# Patient Record
Sex: FEMALE | Race: WHITE | NOT HISPANIC OR LATINO | URBAN - METROPOLITAN AREA
[De-identification: names, ages, dates, MRNs, and addresses within clinical notes are randomized per-mention and may not be internally consistent; named-entity substitution may affect disease eponyms.]

---

## 2017-06-19 ENCOUNTER — COMMUNICATION - HEALTHEAST (OUTPATIENT)
Dept: INTERNAL MEDICINE | Facility: CLINIC | Age: 82
End: 2017-06-19

## 2017-06-26 ENCOUNTER — OFFICE VISIT - HEALTHEAST (OUTPATIENT)
Dept: INTERNAL MEDICINE | Facility: CLINIC | Age: 82
End: 2017-06-26

## 2017-06-26 DIAGNOSIS — D64.9 NORMOCYTIC ANEMIA: ICD-10-CM

## 2017-06-26 DIAGNOSIS — L03.116 LEFT LEG CELLULITIS: ICD-10-CM

## 2017-06-26 DIAGNOSIS — Z71.89 ADVANCE DIRECTIVE DISCUSSED WITH PATIENT: ICD-10-CM

## 2017-06-26 DIAGNOSIS — Z00.00 ROUTINE PHYSICAL EXAMINATION: ICD-10-CM

## 2017-06-26 DIAGNOSIS — E53.8 B12 DEFICIENCY: ICD-10-CM

## 2017-06-26 DIAGNOSIS — S81.809A WOUND, OPEN, LEG: ICD-10-CM

## 2017-06-26 DIAGNOSIS — R41.3 MEMORY LOSS: ICD-10-CM

## 2017-06-26 DIAGNOSIS — M79.89 LEFT LEG SWELLING: ICD-10-CM

## 2017-06-26 ASSESSMENT — MIFFLIN-ST. JEOR: SCORE: 736.08

## 2017-07-04 ENCOUNTER — COMMUNICATION - HEALTHEAST (OUTPATIENT)
Dept: INTERNAL MEDICINE | Facility: CLINIC | Age: 82
End: 2017-07-04

## 2017-07-05 ENCOUNTER — OFFICE VISIT - HEALTHEAST (OUTPATIENT)
Dept: VASCULAR SURGERY | Facility: CLINIC | Age: 82
End: 2017-07-05

## 2017-07-05 DIAGNOSIS — R60.0 LOCALIZED EDEMA: ICD-10-CM

## 2017-07-05 DIAGNOSIS — I87.303 VENOUS HYPERTENSION, BILATERAL: ICD-10-CM

## 2017-07-05 DIAGNOSIS — R54 ADVANCED AGE: ICD-10-CM

## 2017-07-05 DIAGNOSIS — L03.116 LEFT LEG CELLULITIS: ICD-10-CM

## 2017-07-06 ENCOUNTER — COMMUNICATION - HEALTHEAST (OUTPATIENT)
Dept: INTERNAL MEDICINE | Facility: CLINIC | Age: 82
End: 2017-07-06

## 2017-07-09 ENCOUNTER — AMBULATORY - HEALTHEAST (OUTPATIENT)
Dept: INTERNAL MEDICINE | Facility: CLINIC | Age: 82
End: 2017-07-09

## 2017-07-12 ENCOUNTER — OFFICE VISIT - HEALTHEAST (OUTPATIENT)
Dept: VASCULAR SURGERY | Facility: CLINIC | Age: 82
End: 2017-07-12

## 2017-07-12 DIAGNOSIS — L03.116 LEFT LEG CELLULITIS: ICD-10-CM

## 2017-07-12 DIAGNOSIS — I87.303 VENOUS HYPERTENSION, BILATERAL: ICD-10-CM

## 2017-07-12 DIAGNOSIS — R54 ADVANCED AGE: ICD-10-CM

## 2017-07-12 DIAGNOSIS — R60.0 LOCALIZED EDEMA: ICD-10-CM

## 2017-07-12 DIAGNOSIS — L30.9 DERMATITIS: ICD-10-CM

## 2017-07-13 ENCOUNTER — COMMUNICATION - HEALTHEAST (OUTPATIENT)
Dept: INTERNAL MEDICINE | Facility: CLINIC | Age: 82
End: 2017-07-13

## 2017-07-26 ENCOUNTER — AMBULATORY - HEALTHEAST (OUTPATIENT)
Dept: NURSING | Facility: CLINIC | Age: 82
End: 2017-07-26

## 2017-07-26 ENCOUNTER — COMMUNICATION - HEALTHEAST (OUTPATIENT)
Dept: INTERNAL MEDICINE | Facility: CLINIC | Age: 82
End: 2017-07-26

## 2017-07-26 ENCOUNTER — OFFICE VISIT - HEALTHEAST (OUTPATIENT)
Dept: VASCULAR SURGERY | Facility: CLINIC | Age: 82
End: 2017-07-26

## 2017-07-26 ENCOUNTER — AMBULATORY - HEALTHEAST (OUTPATIENT)
Dept: LAB | Facility: CLINIC | Age: 82
End: 2017-07-26

## 2017-07-26 ENCOUNTER — COMMUNICATION - HEALTHEAST (OUTPATIENT)
Dept: LAB | Facility: CLINIC | Age: 82
End: 2017-07-26

## 2017-07-26 DIAGNOSIS — D64.9 ANEMIA: ICD-10-CM

## 2017-07-26 DIAGNOSIS — L03.116 LEFT LEG CELLULITIS: ICD-10-CM

## 2017-07-26 DIAGNOSIS — I87.303 VENOUS HYPERTENSION, BILATERAL: ICD-10-CM

## 2017-07-26 DIAGNOSIS — R60.0 LOCALIZED EDEMA: ICD-10-CM

## 2017-07-26 DIAGNOSIS — R54 ADVANCED AGE: ICD-10-CM

## 2017-07-26 DIAGNOSIS — L30.9 DERMATITIS: ICD-10-CM

## 2017-08-14 ENCOUNTER — OFFICE VISIT - HEALTHEAST (OUTPATIENT)
Dept: INTERNAL MEDICINE | Facility: CLINIC | Age: 82
End: 2017-08-14

## 2017-08-14 ENCOUNTER — COMMUNICATION - HEALTHEAST (OUTPATIENT)
Dept: INTERNAL MEDICINE | Facility: CLINIC | Age: 82
End: 2017-08-14

## 2017-08-14 DIAGNOSIS — I87.2 STASIS DERMATITIS: ICD-10-CM

## 2017-08-14 DIAGNOSIS — R41.3 MEMORY LOSS: ICD-10-CM

## 2017-08-14 DIAGNOSIS — D50.9 IDA (IRON DEFICIENCY ANEMIA): ICD-10-CM

## 2017-08-14 DIAGNOSIS — I87.2 VENOUS INSUFFICIENCY: ICD-10-CM

## 2017-08-14 ASSESSMENT — MIFFLIN-ST. JEOR: SCORE: 711.59

## 2017-08-18 ENCOUNTER — COMMUNICATION - HEALTHEAST (OUTPATIENT)
Dept: INTERNAL MEDICINE | Facility: CLINIC | Age: 82
End: 2017-08-18

## 2017-08-18 ENCOUNTER — COMMUNICATION - HEALTHEAST (OUTPATIENT)
Dept: SCHEDULING | Facility: CLINIC | Age: 82
End: 2017-08-18

## 2017-08-24 ENCOUNTER — COMMUNICATION - HEALTHEAST (OUTPATIENT)
Dept: INTERNAL MEDICINE | Facility: CLINIC | Age: 82
End: 2017-08-24

## 2017-10-17 ENCOUNTER — OFFICE VISIT - HEALTHEAST (OUTPATIENT)
Dept: INTERNAL MEDICINE | Facility: CLINIC | Age: 82
End: 2017-10-17

## 2017-10-17 ENCOUNTER — COMMUNICATION - HEALTHEAST (OUTPATIENT)
Dept: INTERNAL MEDICINE | Facility: CLINIC | Age: 82
End: 2017-10-17

## 2017-10-17 DIAGNOSIS — C16.9 GASTRIC CARCINOMA (H): ICD-10-CM

## 2017-10-17 DIAGNOSIS — E53.8 B12 DEFICIENCY: ICD-10-CM

## 2017-10-17 DIAGNOSIS — Z78.9 DNI (DO NOT INTUBATE): ICD-10-CM

## 2017-10-17 DIAGNOSIS — Z23 NEED FOR INFLUENZA VACCINATION: ICD-10-CM

## 2017-10-17 DIAGNOSIS — D50.9 IDA (IRON DEFICIENCY ANEMIA): ICD-10-CM

## 2017-10-17 DIAGNOSIS — Z66 DNR (DO NOT RESUSCITATE): ICD-10-CM

## 2017-12-06 ENCOUNTER — AMBULATORY - HEALTHEAST (OUTPATIENT)
Dept: NURSING | Facility: CLINIC | Age: 82
End: 2017-12-06

## 2018-01-04 ENCOUNTER — AMBULATORY - HEALTHEAST (OUTPATIENT)
Dept: NURSING | Facility: CLINIC | Age: 83
End: 2018-01-04

## 2018-01-04 ENCOUNTER — COMMUNICATION - HEALTHEAST (OUTPATIENT)
Dept: INTERNAL MEDICINE | Facility: CLINIC | Age: 83
End: 2018-01-04

## 2018-02-07 ENCOUNTER — AMBULATORY - HEALTHEAST (OUTPATIENT)
Dept: NURSING | Facility: CLINIC | Age: 83
End: 2018-02-07

## 2018-02-21 ENCOUNTER — COMMUNICATION - HEALTHEAST (OUTPATIENT)
Dept: INTERNAL MEDICINE | Facility: CLINIC | Age: 83
End: 2018-02-21

## 2018-02-22 ENCOUNTER — OFFICE VISIT - HEALTHEAST (OUTPATIENT)
Dept: INTERNAL MEDICINE | Facility: CLINIC | Age: 83
End: 2018-02-22

## 2018-02-22 DIAGNOSIS — E55.9 VITAMIN D DEFICIENCY: ICD-10-CM

## 2018-02-22 DIAGNOSIS — D50.9 IDA (IRON DEFICIENCY ANEMIA): ICD-10-CM

## 2018-02-22 DIAGNOSIS — C16.9 GASTRIC CARCINOMA (H): ICD-10-CM

## 2018-02-22 DIAGNOSIS — Z91.89 DRIVING SAFETY ISSUE: ICD-10-CM

## 2018-02-22 DIAGNOSIS — R41.3 MEMORY LOSS: ICD-10-CM

## 2018-02-22 DIAGNOSIS — R53.1 GENERALIZED WEAKNESS: ICD-10-CM

## 2018-02-22 DIAGNOSIS — E53.8 B12 DEFICIENCY: ICD-10-CM

## 2018-02-22 DIAGNOSIS — I87.2 VENOUS INSUFFICIENCY: ICD-10-CM

## 2018-02-22 LAB
ERYTHROCYTE [DISTWIDTH] IN BLOOD BY AUTOMATED COUNT: 10.5 % (ref 11–14.5)
FERRITIN SERPL-MCNC: 28 NG/ML (ref 10–130)
HCT VFR BLD AUTO: 36.8 % (ref 35–47)
HGB BLD-MCNC: 12.5 G/DL (ref 12–16)
IRON SATN MFR SERPL: 23 % (ref 20–50)
IRON SERPL-MCNC: 70 UG/DL (ref 42–175)
MCH RBC QN AUTO: 32.3 PG (ref 27–34)
MCHC RBC AUTO-ENTMCNC: 34 G/DL (ref 32–36)
MCV RBC AUTO: 95 FL (ref 80–100)
PLATELET # BLD AUTO: 209 THOU/UL (ref 140–440)
PMV BLD AUTO: 7.8 FL (ref 7–10)
RBC # BLD AUTO: 3.86 MILL/UL (ref 3.8–5.4)
TIBC SERPL-MCNC: 298 UG/DL (ref 313–563)
TRANSFERRIN SERPL-MCNC: 238 MG/DL (ref 212–360)
WBC: 6.9 THOU/UL (ref 4–11)

## 2018-02-23 LAB
25(OH)D3 SERPL-MCNC: 24.7 NG/ML (ref 30–80)
VIT B12 SERPL-MCNC: 435 PG/ML (ref 213–816)

## 2018-02-25 ENCOUNTER — COMMUNICATION - HEALTHEAST (OUTPATIENT)
Dept: INTERNAL MEDICINE | Facility: CLINIC | Age: 83
End: 2018-02-25

## 2018-03-07 ENCOUNTER — AMBULATORY - HEALTHEAST (OUTPATIENT)
Dept: NURSING | Facility: CLINIC | Age: 83
End: 2018-03-07

## 2018-04-04 ENCOUNTER — AMBULATORY - HEALTHEAST (OUTPATIENT)
Dept: NURSING | Facility: CLINIC | Age: 83
End: 2018-04-04

## 2018-05-02 ENCOUNTER — AMBULATORY - HEALTHEAST (OUTPATIENT)
Dept: NURSING | Facility: CLINIC | Age: 83
End: 2018-05-02

## 2018-06-04 ENCOUNTER — COMMUNICATION - HEALTHEAST (OUTPATIENT)
Dept: INTERNAL MEDICINE | Facility: CLINIC | Age: 83
End: 2018-06-04

## 2018-06-04 ENCOUNTER — OFFICE VISIT - HEALTHEAST (OUTPATIENT)
Dept: INTERNAL MEDICINE | Facility: CLINIC | Age: 83
End: 2018-06-04

## 2018-06-04 DIAGNOSIS — D50.9 IDA (IRON DEFICIENCY ANEMIA): ICD-10-CM

## 2018-06-04 DIAGNOSIS — I87.2 VENOUS INSUFFICIENCY: ICD-10-CM

## 2018-06-04 DIAGNOSIS — R41.3 MEMORY LOSS: ICD-10-CM

## 2018-06-04 DIAGNOSIS — C16.9 GASTRIC CARCINOMA (H): ICD-10-CM

## 2018-06-04 LAB
ANION GAP SERPL CALCULATED.3IONS-SCNC: 12 MMOL/L (ref 5–18)
BUN SERPL-MCNC: 14 MG/DL (ref 8–28)
CALCIUM SERPL-MCNC: 9.2 MG/DL (ref 8.5–10.5)
CHLORIDE BLD-SCNC: 112 MMOL/L (ref 98–107)
CO2 SERPL-SCNC: 21 MMOL/L (ref 22–31)
CREAT SERPL-MCNC: 0.71 MG/DL (ref 0.6–1.1)
ERYTHROCYTE [DISTWIDTH] IN BLOOD BY AUTOMATED COUNT: 10.3 % (ref 11–14.5)
FERRITIN SERPL-MCNC: 25 NG/ML (ref 10–130)
GFR SERPL CREATININE-BSD FRML MDRD: >60 ML/MIN/1.73M2
GLUCOSE BLD-MCNC: 83 MG/DL (ref 70–125)
HCT VFR BLD AUTO: 37.9 % (ref 35–47)
HGB BLD-MCNC: 12.6 G/DL (ref 12–16)
IRON SATN MFR SERPL: 31 % (ref 20–50)
IRON SERPL-MCNC: 94 UG/DL (ref 42–175)
MCH RBC QN AUTO: 32.5 PG (ref 27–34)
MCHC RBC AUTO-ENTMCNC: 33.2 G/DL (ref 32–36)
MCV RBC AUTO: 98 FL (ref 80–100)
PLATELET # BLD AUTO: 168 THOU/UL (ref 140–440)
PMV BLD AUTO: 8.2 FL (ref 7–10)
POTASSIUM BLD-SCNC: 4.2 MMOL/L (ref 3.5–5)
RBC # BLD AUTO: 3.87 MILL/UL (ref 3.8–5.4)
SODIUM SERPL-SCNC: 145 MMOL/L (ref 136–145)
TIBC SERPL-MCNC: 308 UG/DL (ref 313–563)
TRANSFERRIN SERPL-MCNC: 247 MG/DL (ref 212–360)
WBC: 6.4 THOU/UL (ref 4–11)

## 2018-06-20 ENCOUNTER — OFFICE VISIT - HEALTHEAST (OUTPATIENT)
Dept: FAMILY MEDICINE | Facility: CLINIC | Age: 83
End: 2018-06-20

## 2018-06-20 DIAGNOSIS — T14.8XXA BRUISING: ICD-10-CM

## 2018-07-03 ENCOUNTER — AMBULATORY - HEALTHEAST (OUTPATIENT)
Dept: NURSING | Facility: CLINIC | Age: 83
End: 2018-07-03

## 2018-08-02 ENCOUNTER — AMBULATORY - HEALTHEAST (OUTPATIENT)
Dept: NURSING | Facility: CLINIC | Age: 83
End: 2018-08-02

## 2018-08-30 ENCOUNTER — COMMUNICATION - HEALTHEAST (OUTPATIENT)
Dept: INTERNAL MEDICINE | Facility: CLINIC | Age: 83
End: 2018-08-30

## 2018-09-06 ENCOUNTER — AMBULATORY - HEALTHEAST (OUTPATIENT)
Dept: NURSING | Facility: CLINIC | Age: 83
End: 2018-09-06

## 2018-10-08 ENCOUNTER — OFFICE VISIT - HEALTHEAST (OUTPATIENT)
Dept: FAMILY MEDICINE | Facility: CLINIC | Age: 83
End: 2018-10-08

## 2018-10-08 DIAGNOSIS — L60.0 INGROWN TOENAIL OF LEFT FOOT WITH INFECTION: ICD-10-CM

## 2018-11-05 ENCOUNTER — HOME CARE/HOSPICE - HEALTHEAST (OUTPATIENT)
Dept: HOME HEALTH SERVICES | Facility: HOME HEALTH | Age: 83
End: 2018-11-05

## 2018-11-07 ENCOUNTER — COMMUNICATION - HEALTHEAST (OUTPATIENT)
Dept: HOME HEALTH SERVICES | Facility: HOME HEALTH | Age: 83
End: 2018-11-07

## 2018-11-07 ENCOUNTER — HOME CARE/HOSPICE - HEALTHEAST (OUTPATIENT)
Dept: HOME HEALTH SERVICES | Facility: HOME HEALTH | Age: 83
End: 2018-11-07

## 2018-11-07 ENCOUNTER — COMMUNICATION - HEALTHEAST (OUTPATIENT)
Dept: CARE COORDINATION | Facility: CLINIC | Age: 83
End: 2018-11-07

## 2018-11-08 ENCOUNTER — HOME CARE/HOSPICE - HEALTHEAST (OUTPATIENT)
Dept: HOME HEALTH SERVICES | Facility: HOME HEALTH | Age: 83
End: 2018-11-08

## 2018-11-09 ENCOUNTER — HOME CARE/HOSPICE - HEALTHEAST (OUTPATIENT)
Dept: HOME HEALTH SERVICES | Facility: HOME HEALTH | Age: 83
End: 2018-11-09

## 2018-11-10 ENCOUNTER — HOME CARE/HOSPICE - HEALTHEAST (OUTPATIENT)
Dept: HOME HEALTH SERVICES | Facility: HOME HEALTH | Age: 83
End: 2018-11-10

## 2018-11-12 ENCOUNTER — COMMUNICATION - HEALTHEAST (OUTPATIENT)
Dept: TELEHEALTH | Facility: CLINIC | Age: 83
End: 2018-11-12

## 2018-11-12 ENCOUNTER — HOME CARE/HOSPICE - HEALTHEAST (OUTPATIENT)
Dept: HOME HEALTH SERVICES | Facility: HOME HEALTH | Age: 83
End: 2018-11-12

## 2018-11-12 ENCOUNTER — OFFICE VISIT - HEALTHEAST (OUTPATIENT)
Dept: INTERNAL MEDICINE | Facility: CLINIC | Age: 83
End: 2018-11-12

## 2018-11-12 DIAGNOSIS — E53.8 B12 DEFICIENCY: ICD-10-CM

## 2018-11-12 DIAGNOSIS — Z09 HOSPITAL DISCHARGE FOLLOW-UP: ICD-10-CM

## 2018-11-12 DIAGNOSIS — I87.2 VENOUS INSUFFICIENCY: ICD-10-CM

## 2018-11-12 DIAGNOSIS — I63.9 ISCHEMIC CEREBROVASCULAR ACCIDENT (CVA) (H): ICD-10-CM

## 2018-11-12 DIAGNOSIS — R29.810 FACIAL DROOP: ICD-10-CM

## 2018-11-12 DIAGNOSIS — C16.9 GASTRIC CARCINOMA (H): ICD-10-CM

## 2018-11-12 DIAGNOSIS — I10 ESSENTIAL HYPERTENSION: ICD-10-CM

## 2018-11-12 DIAGNOSIS — R41.82 ALTERED MENTAL STATUS, UNSPECIFIED ALTERED MENTAL STATUS TYPE: ICD-10-CM

## 2018-11-13 ENCOUNTER — HOME CARE/HOSPICE - HEALTHEAST (OUTPATIENT)
Dept: HOME HEALTH SERVICES | Facility: HOME HEALTH | Age: 83
End: 2018-11-13

## 2018-11-13 ENCOUNTER — COMMUNICATION - HEALTHEAST (OUTPATIENT)
Dept: PHYSICAL THERAPY | Facility: CLINIC | Age: 83
End: 2018-11-13

## 2018-11-14 ENCOUNTER — HOME CARE/HOSPICE - HEALTHEAST (OUTPATIENT)
Dept: HOME HEALTH SERVICES | Facility: HOME HEALTH | Age: 83
End: 2018-11-14

## 2018-11-15 ENCOUNTER — HOME CARE/HOSPICE - HEALTHEAST (OUTPATIENT)
Dept: HOME HEALTH SERVICES | Facility: HOME HEALTH | Age: 83
End: 2018-11-15

## 2018-11-16 ENCOUNTER — HOME CARE/HOSPICE - HEALTHEAST (OUTPATIENT)
Dept: HOME HEALTH SERVICES | Facility: HOME HEALTH | Age: 83
End: 2018-11-16

## 2018-11-17 ENCOUNTER — HOME CARE/HOSPICE - HEALTHEAST (OUTPATIENT)
Dept: HOME HEALTH SERVICES | Facility: HOME HEALTH | Age: 83
End: 2018-11-17

## 2018-11-18 ENCOUNTER — COMMUNICATION - HEALTHEAST (OUTPATIENT)
Dept: HOME HEALTH SERVICES | Facility: HOME HEALTH | Age: 83
End: 2018-11-18

## 2018-11-19 ENCOUNTER — HOME CARE/HOSPICE - HEALTHEAST (OUTPATIENT)
Dept: HOME HEALTH SERVICES | Facility: HOME HEALTH | Age: 83
End: 2018-11-19

## 2018-11-20 ENCOUNTER — HOME CARE/HOSPICE - HEALTHEAST (OUTPATIENT)
Dept: HOME HEALTH SERVICES | Facility: HOME HEALTH | Age: 83
End: 2018-11-20

## 2018-11-21 ENCOUNTER — HOME CARE/HOSPICE - HEALTHEAST (OUTPATIENT)
Dept: HOME HEALTH SERVICES | Facility: HOME HEALTH | Age: 83
End: 2018-11-21

## 2018-11-23 ENCOUNTER — HOME CARE/HOSPICE - HEALTHEAST (OUTPATIENT)
Dept: HOME HEALTH SERVICES | Facility: HOME HEALTH | Age: 83
End: 2018-11-23

## 2018-12-11 ENCOUNTER — OFFICE VISIT - HEALTHEAST (OUTPATIENT)
Dept: INTERNAL MEDICINE | Facility: CLINIC | Age: 83
End: 2018-12-11

## 2018-12-11 ENCOUNTER — COMMUNICATION - HEALTHEAST (OUTPATIENT)
Dept: INTERNAL MEDICINE | Facility: CLINIC | Age: 83
End: 2018-12-11

## 2018-12-11 DIAGNOSIS — E53.8 B12 DEFICIENCY: ICD-10-CM

## 2018-12-11 DIAGNOSIS — I10 ESSENTIAL HYPERTENSION: ICD-10-CM

## 2018-12-11 DIAGNOSIS — C16.9 GASTRIC CARCINOMA (H): ICD-10-CM

## 2018-12-11 DIAGNOSIS — F03.90 DEMENTIA WITHOUT BEHAVIORAL DISTURBANCE, UNSPECIFIED DEMENTIA TYPE: ICD-10-CM

## 2018-12-11 DIAGNOSIS — D64.9 ANEMIA, UNSPECIFIED TYPE: ICD-10-CM

## 2018-12-11 LAB
ANION GAP SERPL CALCULATED.3IONS-SCNC: 10 MMOL/L (ref 5–18)
BUN SERPL-MCNC: 29 MG/DL (ref 8–28)
CALCIUM SERPL-MCNC: 9.5 MG/DL (ref 8.5–10.5)
CHLORIDE BLD-SCNC: 104 MMOL/L (ref 98–107)
CO2 SERPL-SCNC: 26 MMOL/L (ref 22–31)
CREAT SERPL-MCNC: 0.84 MG/DL (ref 0.6–1.1)
ERYTHROCYTE [DISTWIDTH] IN BLOOD BY AUTOMATED COUNT: 11.3 % (ref 11–14.5)
GFR SERPL CREATININE-BSD FRML MDRD: >60 ML/MIN/1.73M2
GLUCOSE BLD-MCNC: 107 MG/DL (ref 70–125)
HCT VFR BLD AUTO: 37 % (ref 35–47)
HGB BLD-MCNC: 12.8 G/DL (ref 12–16)
MCH RBC QN AUTO: 32.9 PG (ref 27–34)
MCHC RBC AUTO-ENTMCNC: 34.6 G/DL (ref 32–36)
MCV RBC AUTO: 95 FL (ref 80–100)
PLATELET # BLD AUTO: 219 THOU/UL (ref 140–440)
PMV BLD AUTO: 7.9 FL (ref 7–10)
POTASSIUM BLD-SCNC: 4.8 MMOL/L (ref 3.5–5)
RBC # BLD AUTO: 3.88 MILL/UL (ref 3.8–5.4)
SODIUM SERPL-SCNC: 140 MMOL/L (ref 136–145)
WBC: 9.5 THOU/UL (ref 4–11)

## 2018-12-11 ASSESSMENT — MIFFLIN-ST. JEOR: SCORE: 697.98

## 2019-01-01 ENCOUNTER — OFFICE VISIT - HEALTHEAST (OUTPATIENT)
Dept: INTERNAL MEDICINE | Facility: CLINIC | Age: 84
End: 2019-01-01

## 2019-01-01 ENCOUNTER — AMBULATORY - HEALTHEAST (OUTPATIENT)
Dept: NURSING | Facility: CLINIC | Age: 84
End: 2019-01-01

## 2019-01-01 ENCOUNTER — COMMUNICATION - HEALTHEAST (OUTPATIENT)
Dept: INTERNAL MEDICINE | Facility: CLINIC | Age: 84
End: 2019-01-01

## 2019-01-01 ENCOUNTER — OFFICE VISIT - HEALTHEAST (OUTPATIENT)
Dept: GERIATRICS | Facility: CLINIC | Age: 84
End: 2019-01-01

## 2019-01-01 ENCOUNTER — COMMUNICATION - HEALTHEAST (OUTPATIENT)
Dept: TELEHEALTH | Facility: CLINIC | Age: 84
End: 2019-01-01

## 2019-01-01 ENCOUNTER — COMMUNICATION - HEALTHEAST (OUTPATIENT)
Dept: OTHER | Facility: CLINIC | Age: 84
End: 2019-01-01

## 2019-01-01 ENCOUNTER — OFFICE VISIT - HEALTHEAST (OUTPATIENT)
Dept: FAMILY MEDICINE | Facility: CLINIC | Age: 84
End: 2019-01-01

## 2019-01-01 DIAGNOSIS — I63.9 ISCHEMIC CEREBROVASCULAR ACCIDENT (CVA) (H): ICD-10-CM

## 2019-01-01 DIAGNOSIS — I87.2 VENOUS STASIS DERMATITIS OF LEFT LOWER EXTREMITY: ICD-10-CM

## 2019-01-01 DIAGNOSIS — F03.90 DEMENTIA WITHOUT BEHAVIORAL DISTURBANCE, UNSPECIFIED DEMENTIA TYPE: ICD-10-CM

## 2019-01-01 DIAGNOSIS — L03.116 CELLULITIS OF LEFT LEG: ICD-10-CM

## 2019-01-01 DIAGNOSIS — L03.116 CELLULITIS OF LEFT LOWER EXTREMITY: ICD-10-CM

## 2019-01-01 DIAGNOSIS — E53.8 B12 DEFICIENCY: ICD-10-CM

## 2019-01-01 DIAGNOSIS — L03.116 LEFT LEG CELLULITIS: ICD-10-CM

## 2019-01-01 DIAGNOSIS — I10 ESSENTIAL HYPERTENSION: ICD-10-CM

## 2019-01-01 DIAGNOSIS — I48.91 ATRIAL FIBRILLATION WITH RAPID VENTRICULAR RESPONSE (H): ICD-10-CM

## 2019-01-01 DIAGNOSIS — E43 SEVERE PROTEIN-CALORIE MALNUTRITION (GOMEZ: LESS THAN 60% OF STANDARD WEIGHT) (H): ICD-10-CM

## 2019-01-01 DIAGNOSIS — Z66 DNR (DO NOT RESUSCITATE): ICD-10-CM

## 2019-01-01 DIAGNOSIS — M81.0 AGE RELATED OSTEOPOROSIS, UNSPECIFIED PATHOLOGICAL FRACTURE PRESENCE: ICD-10-CM

## 2019-01-01 DIAGNOSIS — I10 PRIMARY HYPERTENSION: ICD-10-CM

## 2019-01-01 DIAGNOSIS — I87.2 VENOUS INSUFFICIENCY OF LEFT LEG: ICD-10-CM

## 2019-01-01 DIAGNOSIS — Z78.9 DNI (DO NOT INTUBATE): ICD-10-CM

## 2019-01-01 DIAGNOSIS — R47.01 APHASIA: ICD-10-CM

## 2019-01-01 DIAGNOSIS — C16.9 GASTRIC CARCINOMA (H): ICD-10-CM

## 2019-01-01 DIAGNOSIS — I63.9 ACUTE CEREBROVASCULAR ACCIDENT (H): ICD-10-CM

## 2019-01-01 DIAGNOSIS — I83.028 VARICOSE VEINS OF LEFT LOWER EXTREMITY WITH ULCER OTHER PART OF LOWER LEG (CODE) (H): ICD-10-CM

## 2019-01-01 DIAGNOSIS — G81.91 RIGHT HEMIPLEGIA (H): ICD-10-CM

## 2019-01-01 DIAGNOSIS — Z23 FLU VACCINE NEED: ICD-10-CM

## 2019-01-01 DIAGNOSIS — I87.2 VENOUS INSUFFICIENCY: ICD-10-CM

## 2019-01-01 DIAGNOSIS — L97.821 NON-PRESSURE CHRONIC ULCER OF OTHER PART OF LEFT LOWER LEG LIMITED TO BREAKDOWN OF SKIN (H): ICD-10-CM

## 2019-01-01 DIAGNOSIS — E11.9 TYPE 2 DIABETES MELLITUS WITHOUT COMPLICATION, WITHOUT LONG-TERM CURRENT USE OF INSULIN (H): ICD-10-CM

## 2019-01-01 LAB
ANION GAP SERPL CALCULATED.3IONS-SCNC: 8 MMOL/L (ref 5–18)
BUN SERPL-MCNC: 14 MG/DL (ref 8–28)
CALCIUM SERPL-MCNC: 8.9 MG/DL (ref 8.5–10.5)
CHLORIDE BLD-SCNC: 110 MMOL/L (ref 98–107)
CO2 SERPL-SCNC: 25 MMOL/L (ref 22–31)
CREAT SERPL-MCNC: 0.78 MG/DL (ref 0.6–1.1)
ERYTHROCYTE [DISTWIDTH] IN BLOOD BY AUTOMATED COUNT: 11.1 % (ref 11–14.5)
GFR SERPL CREATININE-BSD FRML MDRD: >60 ML/MIN/1.73M2
GLUCOSE BLD-MCNC: 91 MG/DL (ref 70–125)
HCT VFR BLD AUTO: 34.8 % (ref 35–47)
HGB BLD-MCNC: 11.3 G/DL (ref 12–16)
MCH RBC QN AUTO: 29.7 PG (ref 27–34)
MCHC RBC AUTO-ENTMCNC: 32.3 G/DL (ref 32–36)
MCV RBC AUTO: 92 FL (ref 80–100)
PLATELET # BLD AUTO: 234 THOU/UL (ref 140–440)
PMV BLD AUTO: 7.6 FL (ref 7–10)
POTASSIUM BLD-SCNC: 4 MMOL/L (ref 3.5–5)
RBC # BLD AUTO: 3.79 MILL/UL (ref 3.8–5.4)
SODIUM SERPL-SCNC: 143 MMOL/L (ref 136–145)
VIT B12 SERPL-MCNC: 613 PG/ML (ref 213–816)
WBC: 6.6 THOU/UL (ref 4–11)

## 2019-01-01 RX ORDER — TRIAMCINOLONE ACETONIDE 1 MG/G
CREAM TOPICAL 2 TIMES DAILY
Qty: 454 G | Refills: 2 | Status: SHIPPED | OUTPATIENT
Start: 2019-01-01

## 2019-01-01 ASSESSMENT — MIFFLIN-ST. JEOR
SCORE: 781.89
SCORE: 779.17

## 2019-01-10 ENCOUNTER — AMBULATORY - HEALTHEAST (OUTPATIENT)
Dept: NURSING | Facility: CLINIC | Age: 84
End: 2019-01-10

## 2019-01-24 ENCOUNTER — COMMUNICATION - HEALTHEAST (OUTPATIENT)
Dept: INTERNAL MEDICINE | Facility: CLINIC | Age: 84
End: 2019-01-24

## 2019-02-12 ENCOUNTER — AMBULATORY - HEALTHEAST (OUTPATIENT)
Dept: NURSING | Facility: CLINIC | Age: 84
End: 2019-02-12

## 2019-03-13 ENCOUNTER — AMBULATORY - HEALTHEAST (OUTPATIENT)
Dept: NURSING | Facility: CLINIC | Age: 84
End: 2019-03-13

## 2019-04-10 ENCOUNTER — AMBULATORY - HEALTHEAST (OUTPATIENT)
Dept: NURSING | Facility: CLINIC | Age: 84
End: 2019-04-10

## 2019-05-28 ENCOUNTER — COMMUNICATION - HEALTHEAST (OUTPATIENT)
Dept: INTERNAL MEDICINE | Facility: CLINIC | Age: 84
End: 2019-05-28

## 2019-05-28 ENCOUNTER — OFFICE VISIT - HEALTHEAST (OUTPATIENT)
Dept: INTERNAL MEDICINE | Facility: CLINIC | Age: 84
End: 2019-05-28

## 2019-05-28 ENCOUNTER — COMMUNICATION - HEALTHEAST (OUTPATIENT)
Dept: TELEHEALTH | Facility: CLINIC | Age: 84
End: 2019-05-28

## 2019-05-28 DIAGNOSIS — I87.2 VENOUS INSUFFICIENCY: ICD-10-CM

## 2019-05-28 DIAGNOSIS — S81.002A OPEN WOUND OF KNEE, LEG, AND ANKLE, LEFT, INITIAL ENCOUNTER: ICD-10-CM

## 2019-05-28 DIAGNOSIS — I10 ESSENTIAL HYPERTENSION: ICD-10-CM

## 2019-05-28 DIAGNOSIS — S81.802A OPEN WOUND OF KNEE, LEG, AND ANKLE, LEFT, INITIAL ENCOUNTER: ICD-10-CM

## 2019-05-28 DIAGNOSIS — S91.002A OPEN WOUND OF KNEE, LEG, AND ANKLE, LEFT, INITIAL ENCOUNTER: ICD-10-CM

## 2019-05-28 DIAGNOSIS — F03.90 DEMENTIA WITHOUT BEHAVIORAL DISTURBANCE, UNSPECIFIED DEMENTIA TYPE: ICD-10-CM

## 2019-05-28 DIAGNOSIS — C16.9 GASTRIC CARCINOMA (H): ICD-10-CM

## 2019-05-28 DIAGNOSIS — E53.8 B12 DEFICIENCY: ICD-10-CM

## 2019-05-28 DIAGNOSIS — I63.9 ISCHEMIC CEREBROVASCULAR ACCIDENT (CVA) (H): ICD-10-CM

## 2019-05-28 LAB
ANION GAP SERPL CALCULATED.3IONS-SCNC: 8 MMOL/L (ref 5–18)
BUN SERPL-MCNC: 17 MG/DL (ref 8–28)
CALCIUM SERPL-MCNC: 9.7 MG/DL (ref 8.5–10.5)
CHLORIDE BLD-SCNC: 110 MMOL/L (ref 98–107)
CO2 SERPL-SCNC: 28 MMOL/L (ref 22–31)
CREAT SERPL-MCNC: 0.82 MG/DL (ref 0.6–1.1)
ERYTHROCYTE [DISTWIDTH] IN BLOOD BY AUTOMATED COUNT: 10.7 % (ref 11–14.5)
GFR SERPL CREATININE-BSD FRML MDRD: >60 ML/MIN/1.73M2
GLUCOSE BLD-MCNC: 95 MG/DL (ref 70–125)
HCT VFR BLD AUTO: 35.9 % (ref 35–47)
HGB BLD-MCNC: 12 G/DL (ref 12–16)
MCH RBC QN AUTO: 31.8 PG (ref 27–34)
MCHC RBC AUTO-ENTMCNC: 33.4 G/DL (ref 32–36)
MCV RBC AUTO: 95 FL (ref 80–100)
PLATELET # BLD AUTO: 239 THOU/UL (ref 140–440)
PMV BLD AUTO: 8.6 FL (ref 7–10)
POTASSIUM BLD-SCNC: 4.2 MMOL/L (ref 3.5–5)
RBC # BLD AUTO: 3.77 MILL/UL (ref 3.8–5.4)
SODIUM SERPL-SCNC: 146 MMOL/L (ref 136–145)
WBC: 6.1 THOU/UL (ref 4–11)

## 2020-01-01 ENCOUNTER — COMMUNICATION - HEALTHEAST (OUTPATIENT)
Dept: ANTICOAGULATION | Facility: CLINIC | Age: 85
End: 2020-01-01

## 2020-01-01 ENCOUNTER — COMMUNICATION - HEALTHEAST (OUTPATIENT)
Dept: INTERNAL MEDICINE | Facility: CLINIC | Age: 85
End: 2020-01-01

## 2020-01-01 ENCOUNTER — OFFICE VISIT - HEALTHEAST (OUTPATIENT)
Dept: GERIATRICS | Facility: CLINIC | Age: 85
End: 2020-01-01

## 2020-01-01 ENCOUNTER — RECORDS - HEALTHEAST (OUTPATIENT)
Dept: ADMINISTRATIVE | Facility: OTHER | Age: 85
End: 2020-01-01

## 2020-01-01 ENCOUNTER — AMBULATORY - HEALTHEAST (OUTPATIENT)
Dept: OTHER | Facility: CLINIC | Age: 85
End: 2020-01-01

## 2020-01-01 ENCOUNTER — AMBULATORY - HEALTHEAST (OUTPATIENT)
Dept: ANTICOAGULATION | Facility: CLINIC | Age: 85
End: 2020-01-01

## 2020-01-01 ENCOUNTER — RECORDS - HEALTHEAST (OUTPATIENT)
Dept: LAB | Facility: CLINIC | Age: 85
End: 2020-01-01

## 2020-01-01 ENCOUNTER — OFFICE VISIT - HEALTHEAST (OUTPATIENT)
Dept: INTERNAL MEDICINE | Facility: CLINIC | Age: 85
End: 2020-01-01

## 2020-01-01 ENCOUNTER — AMBULATORY - HEALTHEAST (OUTPATIENT)
Dept: GERIATRICS | Facility: CLINIC | Age: 85
End: 2020-01-01

## 2020-01-01 ENCOUNTER — COMMUNICATION - HEALTHEAST (OUTPATIENT)
Dept: MEDSURG UNIT | Facility: HOSPITAL | Age: 85
End: 2020-01-01

## 2020-01-01 ENCOUNTER — COMMUNICATION - HEALTHEAST (OUTPATIENT)
Dept: GERIATRICS | Facility: CLINIC | Age: 85
End: 2020-01-01

## 2020-01-01 DIAGNOSIS — R47.02 DYSPHASIA: ICD-10-CM

## 2020-01-01 DIAGNOSIS — I63.9 ACUTE CEREBROVASCULAR ACCIDENT (H): ICD-10-CM

## 2020-01-01 DIAGNOSIS — I82.4Z2 ACUTE DEEP VEIN THROMBOSIS OF LOWER LEG, LEFT (H): ICD-10-CM

## 2020-01-01 DIAGNOSIS — F51.01 PRIMARY INSOMNIA: ICD-10-CM

## 2020-01-01 DIAGNOSIS — I48.91 ATRIAL FIBRILLATION WITH RAPID VENTRICULAR RESPONSE (H): ICD-10-CM

## 2020-01-01 DIAGNOSIS — E43 SEVERE PROTEIN-CALORIE MALNUTRITION (H): ICD-10-CM

## 2020-01-01 DIAGNOSIS — I48.20 CHRONIC ATRIAL FIBRILLATION (H): ICD-10-CM

## 2020-01-01 DIAGNOSIS — E11.9 TYPE 2 DIABETES MELLITUS WITHOUT COMPLICATION, WITHOUT LONG-TERM CURRENT USE OF INSULIN (H): ICD-10-CM

## 2020-01-01 DIAGNOSIS — E53.8 VITAMIN B12 DEFICIENCY (NON ANEMIC): ICD-10-CM

## 2020-01-01 DIAGNOSIS — D69.9 BLEEDING DIATHESIS (H): ICD-10-CM

## 2020-01-01 DIAGNOSIS — I10 PRIMARY HYPERTENSION: ICD-10-CM

## 2020-01-01 DIAGNOSIS — I50.22 CHRONIC SYSTOLIC (CONGESTIVE) HEART FAILURE (H): ICD-10-CM

## 2020-01-01 DIAGNOSIS — I82.412 ACUTE DEEP VEIN THROMBOSIS (DVT) OF LEFT FEMORAL VEIN (H): ICD-10-CM

## 2020-01-01 DIAGNOSIS — I63.9 ISCHEMIC CEREBROVASCULAR ACCIDENT (CVA) (H): ICD-10-CM

## 2020-01-01 DIAGNOSIS — C16.9 GASTRIC CARCINOMA (H): ICD-10-CM

## 2020-01-01 DIAGNOSIS — I50.22 CHRONIC SYSTOLIC CONGESTIVE HEART FAILURE (H): ICD-10-CM

## 2020-01-01 DIAGNOSIS — F03.90 DEMENTIA WITHOUT BEHAVIORAL DISTURBANCE, UNSPECIFIED DEMENTIA TYPE: ICD-10-CM

## 2020-01-01 DIAGNOSIS — R47.01 APHASIA: ICD-10-CM

## 2020-01-01 DIAGNOSIS — I10 ESSENTIAL HYPERTENSION: ICD-10-CM

## 2020-01-01 DIAGNOSIS — E53.8 B12 DEFICIENCY: ICD-10-CM

## 2020-01-01 DIAGNOSIS — G81.91 RIGHT HEMIPLEGIA (H): ICD-10-CM

## 2020-01-01 DIAGNOSIS — D50.9 MICROCYTIC ANEMIA: ICD-10-CM

## 2020-01-01 DIAGNOSIS — G47.9 SLEEP DISTURBANCE: ICD-10-CM

## 2020-01-01 DIAGNOSIS — R74.8 ELEVATED ALKALINE PHOSPHATASE LEVEL: ICD-10-CM

## 2020-01-01 DIAGNOSIS — I82.412 DVT OF DEEP FEMORAL VEIN, LEFT (H): ICD-10-CM

## 2020-01-01 DIAGNOSIS — Z66 PHYSICIAN ORDERS FOR LIFE-SUSTAINING TREATMENT (POLST) FORM INDICATES PATIENT WISH FOR DO-NOT-RESUSCITATE STATUS: ICD-10-CM

## 2020-01-01 DIAGNOSIS — R23.3 ABNORMAL BRUISING: ICD-10-CM

## 2020-01-01 DIAGNOSIS — R60.0 LOCALIZED EDEMA: ICD-10-CM

## 2020-01-01 DIAGNOSIS — R06.89 DECREASED BREATH SOUNDS AT RIGHT LUNG BASE: ICD-10-CM

## 2020-01-01 DIAGNOSIS — R94.5 ABNORMAL RESULTS OF LIVER FUNCTION STUDIES: ICD-10-CM

## 2020-01-01 DIAGNOSIS — R13.12 OROPHARYNGEAL DYSPHAGIA: ICD-10-CM

## 2020-01-01 DIAGNOSIS — D50.9 IRON DEFICIENCY ANEMIA, UNSPECIFIED IRON DEFICIENCY ANEMIA TYPE: ICD-10-CM

## 2020-01-01 DIAGNOSIS — Z79.01 ANTICOAGULATED: ICD-10-CM

## 2020-01-01 DIAGNOSIS — Z78.9 DNI (DO NOT INTUBATE): ICD-10-CM

## 2020-01-01 DIAGNOSIS — I87.2 VENOUS INSUFFICIENCY: ICD-10-CM

## 2020-01-01 DIAGNOSIS — Z86.73 HISTORY OF CVA (CEREBROVASCULAR ACCIDENT): ICD-10-CM

## 2020-01-01 DIAGNOSIS — E11.65 TYPE 2 DIABETES MELLITUS WITH HYPERGLYCEMIA, WITHOUT LONG-TERM CURRENT USE OF INSULIN (H): ICD-10-CM

## 2020-01-01 DIAGNOSIS — R09.02 HYPOXIA: ICD-10-CM

## 2020-01-01 DIAGNOSIS — I50.33 ACUTE ON CHRONIC DIASTOLIC (CONGESTIVE) HEART FAILURE (H): ICD-10-CM

## 2020-01-01 DIAGNOSIS — L03.115 CELLULITIS OF RIGHT LOWER EXTREMITY: ICD-10-CM

## 2020-01-01 DIAGNOSIS — R05.9 COUGHING: ICD-10-CM

## 2020-01-01 DIAGNOSIS — R63.4 WEIGHT LOSS: ICD-10-CM

## 2020-01-01 LAB
ALBUMIN SERPL-MCNC: 2.5 G/DL (ref 3.5–5)
ALBUMIN SERPL-MCNC: 3.5 G/DL (ref 3.5–5)
ALP SERPL-CCNC: 213 U/L (ref 45–120)
ALP SERPL-CCNC: 93 U/L (ref 45–120)
ALT SERPL W P-5'-P-CCNC: 31 U/L (ref 0–45)
ALT SERPL W P-5'-P-CCNC: 55 U/L (ref 0–45)
ANION GAP SERPL CALCULATED.3IONS-SCNC: 15 MMOL/L (ref 5–18)
ANION GAP SERPL CALCULATED.3IONS-SCNC: 8 MMOL/L (ref 5–18)
ANION GAP SERPL CALCULATED.3IONS-SCNC: 8 MMOL/L (ref 5–18)
AST SERPL W P-5'-P-CCNC: 21 U/L (ref 0–40)
AST SERPL W P-5'-P-CCNC: 49 U/L (ref 0–40)
BILIRUB DIRECT SERPL-MCNC: 0.4 MG/DL
BILIRUB SERPL-MCNC: 0.7 MG/DL (ref 0–1)
BILIRUB SERPL-MCNC: 0.8 MG/DL (ref 0–1)
BUN SERPL-MCNC: 16 MG/DL (ref 8–28)
BUN SERPL-MCNC: 18 MG/DL (ref 8–28)
BUN SERPL-MCNC: 29 MG/DL (ref 8–28)
CALCIUM SERPL-MCNC: 8.1 MG/DL (ref 8.5–10.5)
CALCIUM SERPL-MCNC: 8.5 MG/DL (ref 8.5–10.5)
CALCIUM SERPL-MCNC: 8.8 MG/DL (ref 8.5–10.5)
CHLORIDE BLD-SCNC: 103 MMOL/L (ref 98–107)
CHLORIDE BLD-SCNC: 106 MMOL/L (ref 98–107)
CHLORIDE BLD-SCNC: 107 MMOL/L (ref 98–107)
CHOLEST SERPL-MCNC: 153 MG/DL
CK SERPL-CCNC: 54 U/L (ref 30–190)
CO2 SERPL-SCNC: 25 MMOL/L (ref 22–31)
CO2 SERPL-SCNC: 28 MMOL/L (ref 22–31)
CO2 SERPL-SCNC: 30 MMOL/L (ref 22–31)
CREAT SERPL-MCNC: 0.66 MG/DL (ref 0.6–1.1)
CREAT SERPL-MCNC: 0.72 MG/DL (ref 0.6–1.1)
CREAT SERPL-MCNC: 0.9 MG/DL (ref 0.6–1.1)
ERYTHROCYTE [DISTWIDTH] IN BLOOD BY AUTOMATED COUNT: 15.2 % (ref 11–14.5)
ERYTHROCYTE [DISTWIDTH] IN BLOOD BY AUTOMATED COUNT: 16.6 % (ref 11–14.5)
FASTING STATUS PATIENT QL REPORTED: NORMAL
FERRITIN SERPL-MCNC: 30 NG/ML (ref 10–130)
GFR SERPL CREATININE-BSD FRML MDRD: 58 ML/MIN/1.73M2
GFR SERPL CREATININE-BSD FRML MDRD: >60 ML/MIN/1.73M2
GFR SERPL CREATININE-BSD FRML MDRD: >60 ML/MIN/1.73M2
GGT SERPL-CCNC: 136 U/L (ref 0–50)
GLUCOSE BLD-MCNC: 104 MG/DL (ref 70–125)
GLUCOSE BLD-MCNC: 127 MG/DL (ref 70–125)
GLUCOSE BLD-MCNC: 77 MG/DL (ref 70–125)
HCT VFR BLD AUTO: 30.5 % (ref 35–47)
HCT VFR BLD AUTO: 35.6 % (ref 35–47)
HDLC SERPL-MCNC: 64 MG/DL
HGB BLD-MCNC: 10.9 G/DL (ref 12–16)
HGB BLD-MCNC: 9 G/DL (ref 12–16)
INR PPP: 1.7 (ref 0.9–1.1)
INR PPP: 1.7 (ref 0.9–1.1)
INR PPP: 1.8 (ref 0.9–1.1)
INR PPP: 1.9 (ref 0.9–1.1)
INR PPP: 1.9 (ref 0.9–1.1)
INR PPP: 2 (ref 0.9–1.1)
INR PPP: 2.4 (ref 0.9–1.1)
INR PPP: 2.4 (ref 0.9–1.1)
INR PPP: 2.5 (ref 0.9–1.1)
INR PPP: 2.6 (ref 0.9–1.1)
INR PPP: 2.8 (ref 0.9–1.1)
INR PPP: 3 (ref 0.9–1.1)
INR PPP: 3.7 (ref 0.9–1.1)
INR PPP: 4 (ref 0.9–1.1)
INR PPP: 4.5 (ref 0.9–1.1)
INR PPP: 4.6 (ref 0.9–1.1)
INR PPP: 4.7 (ref 0.9–1.1)
INR PPP: 5 (ref 0.9–1.1)
IRON SATN MFR SERPL: 8 % (ref 20–50)
IRON SERPL-MCNC: 31 UG/DL (ref 42–175)
LDLC SERPL CALC-MCNC: 77 MG/DL
MCH RBC QN AUTO: 23.7 PG (ref 27–34)
MCH RBC QN AUTO: 25.4 PG (ref 27–34)
MCHC RBC AUTO-ENTMCNC: 29.5 G/DL (ref 32–36)
MCHC RBC AUTO-ENTMCNC: 30.6 G/DL (ref 32–36)
MCV RBC AUTO: 77 FL (ref 80–100)
MCV RBC AUTO: 86 FL (ref 80–100)
PLATELET # BLD AUTO: 265 THOU/UL (ref 140–440)
PLATELET # BLD AUTO: 287 THOU/UL (ref 140–440)
PMV BLD AUTO: 10.3 FL (ref 8.5–12.5)
PMV BLD AUTO: 8.2 FL (ref 7–10)
POTASSIUM BLD-SCNC: 3.3 MMOL/L (ref 3.5–5)
POTASSIUM BLD-SCNC: 3.6 MMOL/L (ref 3.5–5)
POTASSIUM BLD-SCNC: 3.7 MMOL/L (ref 3.5–5)
PROT SERPL-MCNC: 4.9 G/DL (ref 6–8)
PROT SERPL-MCNC: 6.9 G/DL (ref 6–8)
RBC # BLD AUTO: 3.55 MILL/UL (ref 3.8–5.4)
RBC # BLD AUTO: 4.59 MILL/UL (ref 3.8–5.4)
SODIUM SERPL-SCNC: 141 MMOL/L (ref 136–145)
SODIUM SERPL-SCNC: 142 MMOL/L (ref 136–145)
SODIUM SERPL-SCNC: 147 MMOL/L (ref 136–145)
TIBC SERPL-MCNC: 413 UG/DL (ref 313–563)
TRANSFERRIN SERPL-MCNC: 331 MG/DL (ref 212–360)
TRIGL SERPL-MCNC: 61 MG/DL
VIT B12 SERPL-MCNC: 1405 PG/ML (ref 213–816)
WBC: 10.2 THOU/UL (ref 4–11)
WBC: 8.8 THOU/UL (ref 4–11)

## 2020-01-01 RX ORDER — WARFARIN SODIUM 2 MG/1
TABLET ORAL
Qty: 30 TABLET | Refills: 1 | Status: SHIPPED | OUTPATIENT
Start: 2020-01-01

## 2020-01-01 RX ORDER — ATORVASTATIN CALCIUM 40 MG/1
40 TABLET, FILM COATED ORAL DAILY
Qty: 90 TABLET | Refills: 3 | Status: SHIPPED | OUTPATIENT
Start: 2020-01-01 | End: 2021-03-17

## 2020-01-01 RX ORDER — SPIRONOLACTONE 25 MG/1
25 TABLET ORAL DAILY
Qty: 90 TABLET | Refills: 3 | Status: SHIPPED | OUTPATIENT
Start: 2020-01-01 | End: 2021-03-17

## 2020-01-01 RX ORDER — LANOLIN ALCOHOL/MO/W.PET/CERES
3 CREAM (GRAM) TOPICAL AT BEDTIME
Status: SHIPPED | COMMUNITY
Start: 2020-01-01

## 2020-01-01 RX ORDER — FERROUS SULFATE 325(65) MG
1 TABLET ORAL
Qty: 24 TABLET | Refills: 3 | Status: SHIPPED | OUTPATIENT
Start: 2020-01-01 | End: 2021-03-19

## 2020-10-13 ENCOUNTER — RECORDS - HEALTHEAST (OUTPATIENT)
Dept: ADMINISTRATIVE | Facility: OTHER | Age: 85
End: 2020-10-13

## 2021-05-26 VITALS
TEMPERATURE: 97.8 F | RESPIRATION RATE: 18 BRPM | SYSTOLIC BLOOD PRESSURE: 106 MMHG | DIASTOLIC BLOOD PRESSURE: 68 MMHG | HEART RATE: 76 BPM | OXYGEN SATURATION: 95 %

## 2021-05-27 VITALS
OXYGEN SATURATION: 97 % | TEMPERATURE: 97.2 F | DIASTOLIC BLOOD PRESSURE: 72 MMHG | RESPIRATION RATE: 18 BRPM | SYSTOLIC BLOOD PRESSURE: 102 MMHG | HEART RATE: 105 BPM

## 2021-05-29 NOTE — PATIENT INSTRUCTIONS - HE
Please follow up if you have any further issues.    You may contact me by phone or Inst[employer] if you are worsening or if things are not improving.    Thank you for coming in today.

## 2021-05-30 NOTE — PROGRESS NOTES
CC: Follow up for cellulitis    HPI: Lana is a 89 YO woman who presents today with her  for follow up for cellulitis. She presented to the ED on 6/24 with complaints of lower left leg pain, swelling, and redness. She had a wound on the lateral part of her L lower leg about a month ago and was given bacitracin by Dr. Park. The wound healed after a few days but then a couple weeks later she noticed redness and swelling as well as some pain in the lower L leg. It got worse on 6/24 so her  brought her to the ED. She was diagnosed with cellulitis, given IV cefazolin and discharged with 7 days of clindamycin. Today she and her  report that the redness and swelling are both going down. They think the antibiotic is doing its job. Of note she had her stomach removed due to gastric cancer several years ago.    Review of Systems  Allergy: reviewed  General : negative  Ophthalmic : negative  ENT: negative  Respiratory : no cough, shortness of breath, or wheezing  Cardiovascular : no chest pain or dyspnea on exertion  Gastrointestinal : change in bowel habits, or black or bloody stools  Genito-Urinary: no dysuria, trouble voiding, or hematuria  Dermatological: negative??  Musculoskeletal: swelling and redness in lower L leg  Neurological: negative  Hematological and Lymphatic: negative  Endocrine: negative  ??  Past medical History:  -Gastric cancer with stomach removal    General Appearance:  Alert, well hydrated, no distress,   Extremities:  Small healing wound on lateral lower left leg   Skin:  Erythema and swelling on lower L leg consistent with cellulitis   ?  A/P:    1. Cellulitis of lower L leg  Lana's cellulitis seems to be healing nicely. The redness, swelling, and pain have all decreased after 4 days of clindamycin.     Plan:   Continue current management with the oral antibiotics.   Keep skin moisturized.  Use hydrocortisone cream ( Cortaid ) 2-3 times/ day for the rough spot on the left leg.  She will continue through day 7 of the clindamycin and follow up if symptoms haven't fully resolved.

## 2021-05-30 NOTE — PATIENT INSTRUCTIONS - HE
1. Left leg cellulitis    Continue current management with the oral antibiotics.   Should expect complete recovery by the time you are done with the antibiotics and if not, may need to extend it for another week.   Please call back with a feed back.     Keep skin moisturized.  Use hydrocortisone cream ( Cortaid ) 2-3 times/ day for the rough spot on the left leg.

## 2021-05-30 NOTE — PROGRESS NOTES
Internal Medicine Office Visit  [facility]  Patient Name: Lana Lantigua  Patient Age: 89 y.o.  YOB: 1926  MRN: [id number]    Date of Visit: 2019  Reason for Office Visit:   Chief Complaint   Patient presents with     Leg Swelling     Left leg swelling for a week. Had a sore on the left leg. Was given an antibiotic for the sore. The sore is red/warm to the touch. No pain generally, but can feel it sometimes.            Assessment / Plan / Medical Decision Makin. Cellulitis of left lower extremity  Resolving we will extend patient's clindamycin antibiotics 150 mg 3 times daily for an additional 5 days recommend that she elevate her leg above her heart 1 hour/day.  She will follow-up with her primary in 1 week, sooner if needed.    Patient advised if symptoms persist, worsen or new symptoms arise they are to seek medical care.  All patients questions addressed. Patient verbalized understanding and agreement with plan.     No orders of the defined types were placed in this encounter.  Followup: Return in about 1 week (around 2019). earlier if needed.    Health Maintenance Review  Health Maintenance   Topic Date Due     INFLUENZA VACCINE RULE BASED (1) 2019     FALL RISK ASSESSMENT  2020     ADVANCE DIRECTIVES DISCUSSED WITH PATIENT  10/17/2022     DXA SCAN  Completed     PNEUMOCOCCAL POLYSACCHARIDE VACCINE AGE 65 AND OVER  Completed     TD 18+ HE  Discontinued     PNEUMOCOCCAL CONJUGATE VACCINE FOR ADULTS (PCV13 OR PREVNAR)  Discontinued     ZOSTER VACCINES  Discontinued         I am having Lana Lantigua start on clindamycin. I am also having her maintain her aspirin and spironolactone. We will continue to administer cyanocobalamin.      HPI:  Lana Lantigua is a 89 y.o. year old who presents to the office today Presents to clinic today with persistent leg swelling and patient reports that she is out of her clindamycin.  According to  record review patient was seen in the ED on 2019 at which time she was diagnosed with left leg cellulitis and was prescribed clindamycin 150 mg every 6 hours for 7 days.  At the time of her ED visit patient reported a one-month ago she had had a deep abrasion on the upper outer aspect of her left leg was seen by primary care and was given topical antibiotics which seemed to heal the area until she developed redness and discomfort along the inner lower left leg from  to 2019.  Ultrasound was subsequently completed to rule out DVT and was negative patient was subsequently given cefazolin and for initial therapy in the ED for her cellulitis.  Patient denies any general pain but can feel it sometimes.  She reports the sore is red warm to the touch.  Her daughter is also present reports her left lower extremity erythema warmth edema has significantly improved though not completely resolved and patient has completed her antibiotics.  She denies any fever, chills, malaise or diaphoresis.  They note no additional concerns.      Review of Systems-reviewed and unremarkable other than listed in HPI      Current Scheduled Meds:  Outpatient Encounter Medications as of 2019   Medication Sig Dispense Refill     aspirin 81 mg chewable tablet Chew 1 tablet (81 mg total) daily.  0     spironolactone (ALDACTONE) 25 MG tablet Take 25 mg by mouth daily.              [] clindamycin (CLEOCIN) 150 MG capsule Take 1 capsule (150 mg total) by mouth every 6 (six) hours for 7 days. 28 capsule 0     clindamycin (CLEOCIN) 150 MG capsule Take 1 capsule (150 mg total) by mouth 3 (three) times a day for 5 days. 15 capsule 0     Facility-Administered Encounter Medications as of 2019   Medication Dose Route Frequency Provider Last Rate Last Dose     cyanocobalamin injection 1,000 mcg  1,000 mcg Intramuscular Q30 Days George Park MD   1,000 mcg at 19 1011     Past Medical History:   Diagnosis Date     B12  "deficiency      Dementia 12/11/2018     DNI (do not intubate) 10/17/2017     DNR (do not resuscitate) 10/17/2017     Gastric carcinoma (H)  1994     Nonsmoker      Osteoporosis      Venous insufficiency 8/14/2017     White coat hypertension      Past Surgical History:   Procedure Laterality Date     APPENDECTOMY  1960     CATARACT EXTRACTION Bilateral      CHOLECYSTECTOMY  1960     HYSTERECTOMY  1960     TONSILLECTOMY  age 20      TOTAL GASTRECTOMY  12/01/1994     Social History     Tobacco Use     Smoking status: Never Smoker     Smokeless tobacco: Never Used   Substance Use Topics     Alcohol use: No     Drug use: No     Family History   Problem Relation Age of Onset     Heart failure Father      Lung cancer Sister      Hypertension Mother      Varicose Veins Mother      Cancer Son          choriocarcinoma     Objective / Physical Examination:  Vitals:    07/02/19 0812   BP: 132/60   Pulse: 95   Resp: 16   Temp: 98.6  F (37  C)   SpO2: 98%   Weight: (!) 95 lb (43.1 kg)   Height: 5' 2\" (1.575 m)     Wt Readings from Last 3 Encounters:   07/02/19 (!) 95 lb (43.1 kg)   06/27/19 (!) 97 lb 11.2 oz (44.3 kg)   06/24/19 (!) 98 lb (44.5 kg)     Body mass index is 17.38 kg/m .     General Appearance: Alert cooperative, affect appropriate, speech clear, in no apparent distress  Head: Normocephalic, atraumatic  Eyes:   Conjunctivae clear and sclerae non-icteric  Throat: Lips and mucosa moist.   Lungs:  Normal inspiratory and expiratory effort  Extremities: Patient has trace edema of the left lower extremity with a small amount of erythema and warmth noted at the lateral left calf without drainage    Neuro: Alert and oriented, follows commands appropriately.     Us Venous Leg Left    Result Date: 6/24/2019  EXAM: US VENOUS LEG LEFT LOCATION: [facility] DATE/TIME: 6/24/2019 6:00 PM INDICATION: Diffuse swelling from the knee distally COMPARISON: None. TECHNIQUE: Routine exam without and with compression, augmentation, " and duplex utilizing 2D gray-scale imaging, Doppler interrogation with color-flow and spectral waveform analysis. FINDINGS: The common femoral, femoral, popliteal, and segmentally visualized calf veins were evaluated. The opposite CFV was also included in the evaluation. Left leg veins are negative for deep venous thrombosis. No popliteal cysts. Nonspecific subcutaneous edema.     CONCLUSION: 1.  Left leg veins are negative for DVT.    Recent Results (from the past 240 hour(s))   Blood culture from PERIPHERAL SITE   Result Value Ref Range    Anaerobic Blood Culture Bottle No Growth No Growth, No organisms seen, bottle returned to instrument, Specimen not received, No Growth at 24 hours, No Growth at 48 hours, No Growth at 72 hours, No Growth at 96 hours, No Growth at 120 hours    Aerobic Blood Culture Bottle No Growth No Growth, No organisms seen, bottle returned to instrument, Specimen not received, No Growth at 24 hours, No Growth at 120 hours, No Growth at 48 hours, No Growth at 72 hours, No Growth at 96 hours   Basic Metabolic Panel   Result Value Ref Range    Sodium 144 136 - 145 mmol/L    Potassium 3.5 3.5 - 5.0 mmol/L    Chloride 109 (H) 98 - 107 mmol/L    CO2 26 22 - 31 mmol/L    Anion Gap, Calculation 9 5 - 18 mmol/L    Glucose 96 70 - 125 mg/dL    Calcium 9.3 8.5 - 10.5 mg/dL    BUN 9 8 - 28 mg/dL    Creatinine 0.76 0.60 - 1.10 mg/dL    GFR MDRD Af Amer >60 >60 mL/min/1.73m2    GFR MDRD Non Af Amer >60 >60 mL/min/1.73m2   HM2 (CBC W/O DIFF)   Result Value Ref Range    WBC 7.4 4.0 - 11.0 thou/uL    RBC 3.50 (L) 3.80 - 5.40 mill/uL    Hemoglobin 10.9 (L) 12.0 - 16.0 g/dL    Hematocrit 34.1 (L) 35.0 - 47.0 %    MCV 97 80 - 100 fL    MCH 31.1 27.0 - 34.0 pg    MCHC 32.0 32.0 - 36.0 g/dL    RDW 12.4 11.0 - 14.5 %    Platelets 274 140 - 440 thou/uL    MPV 9.7 8.5 - 12.5 fL   C-Reactive Protein   Result Value Ref Range    CRP 0.2 0.0 - 0.8 mg/dL           Kandis Mayorga, CNP

## 2021-05-30 NOTE — PATIENT INSTRUCTIONS - HE
Please follow up if you have any further issues.    You may contact me by phone or [email or web address] if you are worsening or if things are not improving.    Thank you for coming in today.

## 2021-05-31 VITALS — BODY MASS INDEX: 18.46 KG/M2 | WEIGHT: 91.4 LBS

## 2021-05-31 VITALS — WEIGHT: 89 LBS | BODY MASS INDEX: 17.98 KG/M2

## 2021-05-31 VITALS — HEIGHT: 59 IN | WEIGHT: 96 LBS | BODY MASS INDEX: 19.35 KG/M2

## 2021-05-31 VITALS — HEIGHT: 59 IN | BODY MASS INDEX: 18.27 KG/M2 | WEIGHT: 90.6 LBS

## 2021-06-01 VITALS — WEIGHT: 87.2 LBS | BODY MASS INDEX: 17.61 KG/M2

## 2021-06-01 VITALS — WEIGHT: 90 LBS | BODY MASS INDEX: 18.18 KG/M2

## 2021-06-01 VITALS — WEIGHT: 85 LBS | BODY MASS INDEX: 17.17 KG/M2

## 2021-06-02 VITALS — BODY MASS INDEX: 19.39 KG/M2 | WEIGHT: 96 LBS

## 2021-06-02 VITALS — WEIGHT: 87.6 LBS | BODY MASS INDEX: 17.66 KG/M2 | HEIGHT: 59 IN

## 2021-06-02 VITALS — BODY MASS INDEX: 15.91 KG/M2 | WEIGHT: 87 LBS

## 2021-06-02 NOTE — PATIENT INSTRUCTIONS - HE
Please follow up if you have any further issues.    You may contact me by phone or MyChart if you are worsening or if things are not improving.    Thank you for coming in today.      ______________________________________________________________________      Future Appointments   Date Time Provider Department Center   11/7/2019 10:40 AM JOSÉ LUIS [facility]   11/29/2019 10:05 AM Goerge Park MD [facility]

## 2021-06-03 VITALS
DIASTOLIC BLOOD PRESSURE: 85 MMHG | RESPIRATION RATE: 18 BRPM | SYSTOLIC BLOOD PRESSURE: 141 MMHG | OXYGEN SATURATION: 98 % | HEART RATE: 93 BPM | BODY MASS INDEX: 18.78 KG/M2 | TEMPERATURE: 98.1 F | WEIGHT: 106 LBS

## 2021-06-03 VITALS
OXYGEN SATURATION: 98 % | DIASTOLIC BLOOD PRESSURE: 70 MMHG | HEIGHT: 62 IN | TEMPERATURE: 97.5 F | SYSTOLIC BLOOD PRESSURE: 120 MMHG | HEART RATE: 80 BPM | BODY MASS INDEX: 17.59 KG/M2 | WEIGHT: 95.6 LBS

## 2021-06-03 VITALS
HEART RATE: 99 BPM | BODY MASS INDEX: 18.11 KG/M2 | SYSTOLIC BLOOD PRESSURE: 126 MMHG | OXYGEN SATURATION: 96 % | WEIGHT: 99 LBS | DIASTOLIC BLOOD PRESSURE: 70 MMHG

## 2021-06-03 VITALS — BODY MASS INDEX: 17.87 KG/M2 | WEIGHT: 97.7 LBS

## 2021-06-03 VITALS — HEIGHT: 62 IN | BODY MASS INDEX: 17.48 KG/M2 | WEIGHT: 95 LBS

## 2021-06-03 VITALS — WEIGHT: 92 LBS | BODY MASS INDEX: 16.83 KG/M2

## 2021-06-03 NOTE — PROGRESS NOTES
Things to address at the visit if able:    1. Encourage use of triamcinolone (Kenalog) cream  2. Blood work ordered.  3. Follow up leg rash.

## 2021-06-03 NOTE — PROGRESS NOTES
Results of tests sent to patient via [employer].    Please look under the Labs tab for specific communication.    George Park MD  [facility]  11/29/2019, 10:51 PM

## 2021-06-03 NOTE — PATIENT INSTRUCTIONS - HE
Please follow up if you have any further issues.    You may contact me by phone or MetaLog[employer] if you are worsening or if things are not improving.    Thank you for coming in today.

## 2021-06-03 NOTE — TELEPHONE ENCOUNTER
I received a message from the [employer] showroom staff asking me to call to discuss cost of Velcro. I called the home phone and left a VM asking her to call me back in regards to a question she had and in regards to the order we received from her provider. I left my phone number for her to call back.

## 2021-06-04 VITALS
WEIGHT: 90.5 LBS | DIASTOLIC BLOOD PRESSURE: 82 MMHG | OXYGEN SATURATION: 99 % | BODY MASS INDEX: 16.03 KG/M2 | SYSTOLIC BLOOD PRESSURE: 134 MMHG | HEART RATE: 76 BPM

## 2021-06-04 VITALS
OXYGEN SATURATION: 95 % | DIASTOLIC BLOOD PRESSURE: 64 MMHG | SYSTOLIC BLOOD PRESSURE: 128 MMHG | WEIGHT: 82.6 LBS | HEART RATE: 89 BPM | BODY MASS INDEX: 14.63 KG/M2

## 2021-06-04 NOTE — PROGRESS NOTES
Code Status:  DNR  Visit Type: Problem Visit (Insomnia)     Facility:  [facility] [[id number]]      Facility Type: SNF (Skilled Nursing Facility, TCU)    History of Present Illness:   Hospital Admission Date: 12/22/2019 Hospital Discharge Date: 12/27/2019      Lana Lantigua is a 89 y.o. female who has a past medical history for gastric cancer, hypertension, osteoporosis, vitamin B12 deficiency, Alzheimer's type dementia, and malnutrition.  She was recently hospitalized for right-sided weakness and aphasia and was found to have an acute left MCA ischemic stroke.  In the emergency room she did receive TPA and thrombectomy.  She was started on aspirin and atorvastatin and is to follow-up with neurology.  She does have right-sided hemiparesis with aphasia and was found to have dysphasia however her diet has been advanced and is currently on puréed diet.  She also developed atrial fibrillation with RVR and was started on metoprolol and apixaban.  She was found to have moderate aortic regurgitation and is to follow-up with cardiology.  Her A1c was 6.4% and she was started on metformin.    Today, nursing voices concern regarding her sleeping habits the past 2 nights.  Nursing reports that she does not sleep at all during the night and then is very fatigued and tired during the day napping frequently.  Nursing also reports that she seems to be more confused likely related to sleep deprivation.  She is minimally verbal and is not always easily redirected due to her dementia.  I do see her lying in bed and she is sleeping.  She is easily awoke with voice however only answers my questions with 1 or 2 word answers.    Past Medical History:   Diagnosis Date     B12 deficiency      Dementia (H) 12/11/2018     DNI (do not intubate) 10/17/2017     DNR (do not resuscitate) 10/17/2017     Gastric carcinoma (H)  1994     Nonsmoker      Osteoporosis      Venous insufficiency 8/14/2017     White coat hypertension         Current Outpatient Medications   Medication Sig Dispense Refill     melatonin 3 mg Tab tablet Take 3 mg by mouth at bedtime.       traZODone (DESYREL) 50 MG tablet Take 25 mg by mouth at bedtime.       acetaminophen (TYLENOL) 500 MG tablet Take 1 tablet (500 mg total) by mouth every 6 (six) hours as needed for pain or fever.  0     apixaban (ELIQUIS) 2.5 mg Tab tablet Take 1 tablet (2.5 mg total) by mouth 2 (two) times a day. 60 tablet 0     aspirin 81 mg chewable tablet Chew 1 tablet (81 mg total) daily.  0     atorvastatin (LIPITOR) 40 MG tablet Take 1 tablet (40 mg total) by mouth at bedtime.  0     bisacodyl (DULCOLAX) 10 mg suppository Insert suppository rectally daily as needed for treatment of constipation.  0     metFORMIN (FORTAMET) 500 MG (OSM) 24 hr tablet Take 1 tablet (500 mg total) by mouth daily with breakfast.       metoprolol tartrate (LOPRESSOR) 50 MG tablet Take 1 tablet (50 mg total) by mouth 2 (two) times a day.  0     polyethylene glycol (MIRALAX) 17 gram packet Take 1 packet (17 g total) by mouth daily as needed.  0     senna-docusate (PERICOLACE) 8.6-50 mg tablet Take 1 tablet by mouth 2 (two) times a day as needed for constipation.  0     triamcinolone (KENALOG) 0.1 % cream Apply topically 2 (two) times a day. 454 g 2     Current Facility-Administered Medications   Medication Dose Route Frequency Provider Last Rate Last Dose     cyanocobalamin injection 1,000 mcg  1,000 mcg Intramuscular Q30 Days George Park MD   1,000 mcg at 12/05/19 1033     No Known Allergies  Immunization History   Administered Date(s) Administered     Influenza high dose,seasonal,PF, 65+ yrs 10/17/2017, 10/04/2019     Influenza, inj, historic,unspecified 09/10/2009, 12/08/2013, 10/08/2018     Pneumo Polysac 23-V 12/01/2004       Review of Systems   To call to obtain due to patient's dementia.  She does deny any current pain or discomforts.  Nursing reports she is having bowel movements and urinating well.   Speech therapy will be increasing her diet to mechanical soft today as she is swallowing well.    Physical Exam   Vital signs: /78, heart rate 94, respiratory 16, temp 97.9, 95% on room air.  Blood sugars in good range 98-1 80.  GENERAL APPEARANCE: Thin, elderly female in no acute distress.  HEENT: normocephalic, atraumatic  PERRL, sclerae anicteric, conjunctivae clear and moist, EOM intact  LUNGS: Lung sounds CTA, no adventitious sounds, respiratory effort normal.  CARD: RRR, S1, S2, without murmurs, gallops, rubs,   ABD: Soft and nontender with normal bowel sounds.   MSK: Right-sided weakness lower extremity  EXTREMITIES: No cyanosis, clubbing or edema.  NEURO: Alert with obvious cognitive impairment.  Face is symmetric.  SKIN: Inspection of the skin reveals no rashes, ulcerations or petechiae.  PSYCH: euthymic            Labs:    Recent Results (from the past 240 hour(s))   Potassium - Next AM   Result Value Ref Range    Potassium 3.8 3.5 - 5.0 mmol/L   Magnesium   Result Value Ref Range    Magnesium 1.8 1.8 - 2.6 mg/dL   POCT Glucose - when taking PO 4x daily AC and QHS   Result Value Ref Range    Glucose 143 (H) 70 - 139 mg/dL   POCT Glucose - when taking PO 4x daily AC and QHS   Result Value Ref Range    Glucose 124 70 - 139 mg/dL   POCT Glucose - when taking PO 4x daily AC and QHS   Result Value Ref Range    Glucose 157 (H) 70 - 139 mg/dL   POCT Glucose - when taking PO 4x daily AC and QHS   Result Value Ref Range    Glucose 176 (H) 70 - 139 mg/dL   Magnesium   Result Value Ref Range    Magnesium 1.8 1.8 - 2.6 mg/dL   HM2(CBC w/o Differential)   Result Value Ref Range    WBC 10.2 4.0 - 11.0 thou/uL    RBC 2.99 (L) 3.80 - 5.40 mill/uL    Hemoglobin 8.5 (L) 12.0 - 16.0 g/dL    Hematocrit 27.3 (L) 35.0 - 47.0 %    MCV 91 80 - 100 fL    MCH 28.4 27.0 - 34.0 pg    MCHC 31.1 (L) 32.0 - 36.0 g/dL    RDW 14.1 11.0 - 14.5 %    Platelets 169 140 - 440 thou/uL    MPV 10.3 8.5 - 12.5 fL   Basic Metabolic Panel    Result Value Ref Range    Sodium 140 136 - 145 mmol/L    Potassium 3.8 3.5 - 5.0 mmol/L    Chloride 110 (H) 98 - 107 mmol/L    CO2 23 22 - 31 mmol/L    Anion Gap, Calculation 7 5 - 18 mmol/L    Glucose 113 70 - 125 mg/dL    Calcium 7.9 (L) 8.5 - 10.5 mg/dL    BUN 24 8 - 28 mg/dL    Creatinine 0.98 0.60 - 1.10 mg/dL    GFR MDRD Af Amer >60 >60 mL/min/1.73m2    GFR MDRD Non Af Amer 53 (L) >60 mL/min/1.73m2   POCT Glucose - when taking PO 4x daily AC and QHS   Result Value Ref Range    Glucose 110 70 - 139 mg/dL   POCT Glucose   Result Value Ref Range    Glucose 205 (H) 70 - 139 mg/dL   POCT Glucose   Result Value Ref Range    Glucose 173 (H) 70 - 139 mg/dL   Magnesium   Result Value Ref Range    Magnesium 1.9 1.8 - 2.6 mg/dL   Potassium - Next AM   Result Value Ref Range    Potassium 3.6 3.5 - 5.0 mmol/L   POCT Glucose   Result Value Ref Range    Glucose 113 70 - 139 mg/dL   POCT Glucose   Result Value Ref Range    Glucose 196 (H) 70 - 139 mg/dL   POCT Glucose   Result Value Ref Range    Glucose 115 70 - 139 mg/dL   POCT Glucose   Result Value Ref Range    Glucose 212 (H) 70 - 139 mg/dL   Magnesium   Result Value Ref Range    Magnesium 1.8 1.8 - 2.6 mg/dL   Potassium - Next AM   Result Value Ref Range    Potassium 4.1 3.5 - 5.0 mmol/L   POCT Glucose   Result Value Ref Range    Glucose 101 70 - 139 mg/dL   POCT Glucose   Result Value Ref Range    Glucose 168 (H) 70 - 139 mg/dL         Assessment:  1. Sleep disturbance     2. Acute cerebrovascular accident (H)     3. Right hemiplegia (H)     4. Type 2 diabetes mellitus without complication, without long-term current use of insulin (H)         Plan:    Sleep disturbance: Her intermittent cognition is likely related to sleep deprivation.  We will start her on melatonin in order to cycle her circadian rhythms.  Nursing to give melatonin at 7 PM in order for her to become drowsy at bedtime.  We will also start trazodone 25 mg at at bedtime Will need to closely monitor due  to her age.  Could consider an antidepressant in the future if continues to have issues with distractibility and unable to redirect.    Acute CVA: Continue therapies unsure of the recovery due to her underlying dementia.  Continue on atorvastatin and aspirin.    Hemiplegia: Continue therapies, very high falls risk will need close supervision.  If she continues to have issues with redirection may need higher level of care at discharge.    Diabetes: Blood sugars look good we will change her blood sugar checks to once daily in the a.m. and continue metformin 500 mg daily.        Electronically signed by: Aura Buck, CNP

## 2021-06-04 NOTE — PROGRESS NOTES
"[facility]    Facility:   [facility] [[id number]]   Code Status: DNR/DNI      CHIEF COMPLAINT/REASON FOR VISIT:  Chief Complaint   Patient presents with     H & P       HISTORY:      HPI: Ms. Person is a 89-year-old female with a past medical history of 2018 ischemic CVA, hypertension, osteoporosis, remote gastric cancer (1994 gastrectomy), moderate atrial regurgitation, admitted to [facility] following her recent hospital stay for another/acute CVA.    Hospital course: Patient presented December 22 and was discharged December 27.  She presented with acute right-sided weakness (had some residual on the right side from 2018) and aphasia.  She was found to have left MCA M1 1 obstruction and underwent endovascular mechanical thrombectomy on the 22nd.  Follow-up MRI on the 23rd showed 3 x 4 cm area of involvement the left frontal area plus a 3 mm right P gyrus lesion.   Patient was newly diagnosed with atrial fibrillation on presentation, and this is felt to be the mechanism of her CVA.  Echocardiogram additionally showed a moderate aortic regurgitation.  She was seen by neurology who recommended full anticoagulation and she is now on apixaban plus low-dose aspirin.  Patient had excellent recovery of her motor function/right hemiplegia.  Unfortunately her expressive aphasia remains profoundly affected with some improvement of her receptive aphasia.    Subjective/review of systems: Patient is unable to communicate but does answer questions appropriately and follows commands.  Most of the information is obtained from medical record as well as TCU staff and especially her multiple family members who are at bedside including her spouse son and daughter-in-law.  They all agree that her motor recovery has been good.  They have not been sure how much she understands but she has been able to say very little sometimes she will get out a single word such as \"okay\".  They can see the " frustration in her face when she is trying to respond to questions.   They also notes that she has had progressive dementia.  She is unable to cook over the last year.  Has needed her  supervision for most all activities, medications, etc.  However she is remained active and mobile with minimal right-sided residual from her 2018 CVA.  Staff notes that she has been impulsive here and they have been worried about her transferring on her own for example to go to the bathroom.  She has been spending a lot of time with the [facility] working at the  on puzzles etc.  To be well supervised.  She is been mildly constipated, her medications have been PRN for her bowel program and we discussed changes there today.  She is got chronic peripheral edema which we discussed today.  She has had some recent redness in the right lower leg but was not on antibiotics this last hospitalization.  She was on antibiotics for left leg cellulitis/nonhealing wound last summer but it eventually healed.  There is been no fever sweats or chills.  No cough or swallowing difficulty.  She did have a VFSS on 1226 with speech pathology and did well.  No falls injuries.  Remainder is negative    Past Medical History:   Diagnosis Date     B12 deficiency      Dementia (H) 12/11/2018     DNI (do not intubate) 10/17/2017     DNR (do not resuscitate) 10/17/2017     Gastric carcinoma (H)  1994     Nonsmoker      Osteoporosis      Venous insufficiency 8/14/2017     White coat hypertension              Family History   Problem Relation Age of Onset     Heart failure Father      Lung cancer Sister      Hypertension Mother      Varicose Veins Mother      Cancer Son          choriocarcinoma     Social History     Socioeconomic History     Marital status:      Spouse name: None     Number of children: 7     Years of education: None     Highest education level: None   Occupational History     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: RETIRED    Social Needs     Financial resource strain: None     Food insecurity:     Worry: None     Inability: None     Transportation needs:     Medical: None     Non-medical: None   Tobacco Use     Smoking status: Never Smoker     Smokeless tobacco: Never Used   Substance and Sexual Activity     Alcohol use: No     Drug use: No     Sexual activity: None   Lifestyle     Physical activity:     Days per week: None     Minutes per session: None     Stress: None   Relationships     Social connections:     Talks on phone: None     Gets together: None     Attends Cheondo[employer] service: None     Active member of club or organization: None     Attends meetings of clubs or organizations: None     Relationship status: None     Intimate partner violence:     Fear of current or ex partner: None     Emotionally abused: None     Physically abused: None     Forced sexual activity: None   Other Topics Concern     None   Social History Narrative    Has resided in [place] in the past.  Generally has lived in [place], [place].  [place] in [place] at this time.        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         Review of Systems remainder is negative    Vitals:    12/31/19 2117   BP: 141/85   Pulse: 93   Resp: 18   Temp: 98.1  F (36.7  C)   SpO2: 98%   Weight: 106 lb (48.1 kg)       Physical Exam  Thin elderly female in no distress socially appropriate with eye contact and efforts to speak.  She follows commands consistently.  Normocephalic atraumatic sclera clear nonicteric EOMI oropharynx is moist neck is without adenopathy mass or thyromegaly heart is irregularly irregular at 90 S1-S2 with early diastolic murmur which is subtle PMI is present in the left precordium and not laterally displaced abdomen is thin without organomegaly mass or tenderness normal bowel sounds strength in her arms and legs is grossly symmetric from side to side finger-nose-finger rapidly alternating movements are symmetric side to side but  slow.  There is mild psychomotor retardation noted with response to questions/commands.  She has significant bilateral edema pitting to the knees.  Right side has dusky redness fading turns purple throughout much of the leg from the upper tibia level down the lateral leg towards the malleolus.  There is an old fading marker line around it.  There is subcutaneous blistering anteriorly with a clear serous fluid which is not draining.  No blood or pus noted.  There is scarring on the left leg from previous.  LABS:   Results for ANN TRIPP (MRN [id number]) as of 12/31/2019 21:38   Ref. Range 12/27/2019 04:29 12/27/2019 07:52 12/27/2019 11:34   Potassium Latest Ref Range: 3.5 - 5.0 mmol/L 4.1     Magnesium Latest Ref Range: 1.8 - 2.6 mg/dL 1.8     Glucose Latest Ref Range: 70 - 139 mg/dL  101 168 (H)     Creatinine 0.98, GFR 53  ASSESSMENT:      ICD-10-CM    1. Age related osteoporosis, unspecified pathological fracture presence M81.0    2. Severe protein-calorie malnutrition ([place]: less than 60% of standard weight) (H) E43    3. DNI (do not intubate) Z78.9    4. DNR (do not resuscitate) Z66    5. Aphasia R47.01    6. Right hemiplegia (H) G81.91    7. Acute cerebrovascular accident (H) I63.9    8. Primary hypertension I10      Assessment/plan    1.  CVA left MCA/M1, embolic related to A. Fib       Right hemiparesis minimal good recovery       Expressive and receptive aphasia       CVA 2018     Good motor recovery, decent receptive ability at this point but expressive aphasia is dense.  Complicated by her underlying dementia.  PT, OT, social service, speech therapy.  Prognosis for recovery discussed with patient and family.  She has follow-up with neurology in 2 months, peak recovery in 2 to 3 months typical.  Current plan is for her to return home with her .   -Apixaban   -Aspirin   -Statin--- with LDL goal less than 70   -Blood pressure control    2.  Dementia     -Underlying complicating above.  Patient is  not currently on therapy.  Unclear if she would benefit but would not recommend considering treatment and current clinical situation    3.  New onset atrial fibrillation     Recognize this hospitalization.  Now on apixaban.  Also on metoprolol 50 twice daily.  Rate control marginal with rate 90s.  We will need to watch this and adjust as appropriate.  Reviewed risks of these medications patient and family state understanding.    4.  Hypertension     Now on metoprolol 50 twice daily will monitor blood pressures which have been adequate here    5.  Underweight BMI 18.70     Dietitian consultation, dietary supplements    6.  New prediabetes diagnosis     A1c 6.4 patient was started on metformin which she is tolerating.  We will continue 500 mg daily.  Recheck A1c in 3 months    7.  AR     Moderate, noncontributory at the moment.  Periodic follow-up may be appropriate depending on status.    8.  Remote gastric cancer.  Family reports that she is been eating normally 3 years.  They also report that she is always been thin.  Dietitian consult placed.    9.  Constipation is been a chronic problem for her.  I did change the senna to twice daily scheduled she has PEG bisacodyl as needed                      Electronically signed by: Eladio Morejon MD

## 2021-06-04 NOTE — TELEPHONE ENCOUNTER
I attempted to call Lana again on 12/9/19 because she had asked for price quotes on Velcro calf pieces. I left a general VM with my phone number for her to call me back.

## 2021-06-04 NOTE — PROGRESS NOTES
Code Status:  DNR  Visit Type: Follow Up (hospitalization following left MCA stroke, dementia)     Facility:  [facility] [[id number]]      Facility Type: SNF (Skilled Nursing Facility, TCU)    History of Present Illness:   Hospital Admission Date: 12/22/2019 Hospital Discharge Date: 12/27/2019      Lana Lantigua is a 89 y.o. female who has a past medical history for gastric cancer, hypertension, osteoporosis, vitamin B12 deficiency, Alzheimer's type dementia, and malnutrition.  She was recently hospitalized for right-sided weakness and aphasia and was found to have an acute left MCA ischemic stroke.  In the emergency room she did receive TPA and thrombectomy.  She was started on aspirin and atorvastatin and is to follow-up with neurology.  She does have right-sided hemiparesis with aphasia and was found to have dysphasia however her diet has been advanced and is currently on puréed diet.  She also developed atrial fibrillation with RVR and was started on metoprolol and apixaban.  She was found to have moderate aortic regurgitation and is to follow-up with cardiology.  Her A1c was 6.4% and she was started on metformin.    Today, she appears to be confused and nursing states this is where she has been the entire admission.  She is requesting to frequently go to the bathroom however by the time she is helped to the bathroom she forgets about her request.  She has been found ambulating on her own even with her right-sided hemiparesis.  She is a high falls risk and will likely need increased services at discharge.  I am unsure of her home situation and who she lives with.  I did make an appointment to meet with her son on Thursday of this week to discuss discharge planning.      It was noted that she was on spironolactone at one time and this was discontinued in the hospital.  Her lower extremities have no signs and symptoms of edema.  Her fasting blood sugars are in good range at .  She denies any  pain and is easily entertained with books and writing on paper.    Past Medical History:   Diagnosis Date     B12 deficiency      Dementia (H) 12/11/2018     DNI (do not intubate) 10/17/2017     DNR (do not resuscitate) 10/17/2017     Gastric carcinoma (H)  1994     Nonsmoker      Osteoporosis      Venous insufficiency 8/14/2017     White coat hypertension      Past Surgical History:   Procedure Laterality Date     APPENDECTOMY  1960     CATARACT EXTRACTION Bilateral      CHOLECYSTECTOMY  1960     HYSTERECTOMY  1960     IR CEREBRAL ANGIOGRAM  12/22/2019     TONSILLECTOMY  age 20      TOTAL GASTRECTOMY  12/01/1994     Family History   Problem Relation Age of Onset     Heart failure Father      Lung cancer Sister      Hypertension Mother      Varicose Veins Mother      Cancer Son          choriocarcinoma     Social History     Socioeconomic History     Marital status:      Spouse name: Not on file     Number of children: 7     Years of education: Not on file     Highest education level: Not on file   Occupational History     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: RETIRED   Social Needs     Financial resource strain: Not on file     Food insecurity:     Worry: Not on file     Inability: Not on file     Transportation needs:     Medical: Not on file     Non-medical: Not on file   Tobacco Use     Smoking status: Never Smoker     Smokeless tobacco: Never Used   Substance and Sexual Activity     Alcohol use: No     Drug use: No     Sexual activity: Not on file   Lifestyle     Physical activity:     Days per week: Not on file     Minutes per session: Not on file     Stress: Not on file   Relationships     Social connections:     Talks on phone: Not on file     Gets together: Not on file     Attends Restorationist service: Not on file     Active member of club or organization: Not on file     Attends meetings of clubs or organizations: Not on file     Relationship status: Not on file     Intimate partner violence:      Fear of current or ex partner: Not on file     Emotionally abused: Not on file     Physically abused: Not on file     Forced sexual activity: Not on file   Other Topics Concern     Not on file   Social History Narrative    Has resided in [place] in the past.  Generally has lived in [place], [place].  [place] in [place] at this time.        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       Current Outpatient Medications   Medication Sig Dispense Refill     acetaminophen (TYLENOL) 500 MG tablet Take 1 tablet (500 mg total) by mouth every 6 (six) hours as needed for pain or fever.  0     apixaban (ELIQUIS) 2.5 mg Tab tablet Take 1 tablet (2.5 mg total) by mouth 2 (two) times a day. 60 tablet 0     aspirin 81 mg chewable tablet Chew 1 tablet (81 mg total) daily.  0     atorvastatin (LIPITOR) 40 MG tablet Take 1 tablet (40 mg total) by mouth at bedtime.  0     bisacodyl (DULCOLAX) 10 mg suppository Insert suppository rectally daily as needed for treatment of constipation.  0     metFORMIN (FORTAMET) 500 MG (OSM) 24 hr tablet Take 1 tablet (500 mg total) by mouth daily with breakfast.       metoprolol tartrate (LOPRESSOR) 50 MG tablet Take 1 tablet (50 mg total) by mouth 2 (two) times a day.  0     polyethylene glycol (MIRALAX) 17 gram packet Take 1 packet (17 g total) by mouth daily as needed.  0     senna-docusate (PERICOLACE) 8.6-50 mg tablet Take 1 tablet by mouth 2 (two) times a day as needed for constipation.  0     triamcinolone (KENALOG) 0.1 % cream Apply topically 2 (two) times a day. 454 g 2     Current Facility-Administered Medications   Medication Dose Route Frequency Provider Last Rate Last Dose     cyanocobalamin injection 1,000 mcg  1,000 mcg Intramuscular Q30 Days George Park MD   1,000 mcg at 12/05/19 1033     No Known Allergies  Immunization History   Administered Date(s) Administered     Influenza high dose,seasonal,PF, 65+ yrs 10/17/2017, 10/04/2019     Influenza, inj,  historic,unspecified 09/10/2009, 12/08/2013, 10/08/2018     Pneumo Polysac 23-V 12/01/2004       Post Discharge Medication Reconciliation Status: discharge medications reconciled, continue medications without change    Review of Systems   Patient denies fever, chills, headache, lightheadedness, dizziness, rhinorrhea, cough, congestion, shortness of breath, chest pain, palpitations, abdominal pain, n/v, diarrhea, constipation, change in appetite, dysuria, frequency, burning or pain with urination.  Other than stated in HPI all other review of systems is negative.     Physical Exam   Vital signs: /85, heart rate 93, respiratory 18, temp 98.1, weight 101.8.  GENERAL APPEARANCE: Thin, elderly female in no acute distress.  HEENT: normocephalic, atraumatic  PERRL, sclerae anicteric, conjunctivae clear and moist, EOM intact  NECK: Supple and symmetric. Trachea is midline, no thyromegaly, no adenopathy, and no tenderness  LUNGS: Lung sounds CTA, no adventitious sounds, respiratory effort normal.  CARD: RRR, S1, S2, without murmurs, gallops, rubs,   ABD: Soft and nontender with normal bowel sounds.   MSK: Right-sided weakness lower extremity  EXTREMITIES: No cyanosis, clubbing or edema.  NEURO: Alert obviously confused.  Face is symmetric.  SKIN: Inspection of the skin reveals no rashes, ulcerations or petechiae.  PSYCH: euthymic            Labs:    Recent Results (from the past 240 hour(s))   INR   Result Value Ref Range    INR 1.45 (H) 0.90 - 1.10   APTT(PTT)   Result Value Ref Range    PTT 30 24 - 37 seconds   Basic Metabolic Panel   Result Value Ref Range    Sodium 142 136 - 145 mmol/L    Potassium 3.9 3.5 - 5.0 mmol/L    Chloride 110 (H) 98 - 107 mmol/L    CO2 22 22 - 31 mmol/L    Anion Gap, Calculation 10 5 - 18 mmol/L    Glucose 141 (H) 70 - 125 mg/dL    Calcium 8.4 (L) 8.5 - 10.5 mg/dL    BUN 14 8 - 28 mg/dL    Creatinine 0.83 0.60 - 1.10 mg/dL    GFR MDRD Af Amer >60 >60 mL/min/1.73m2    GFR MDRD Non Af Amer  >60 >60 mL/min/1.73m2   HM2(CBC w/o Differential)   Result Value Ref Range    WBC 7.8 4.0 - 11.0 thou/uL    RBC 3.56 (L) 3.80 - 5.40 mill/uL    Hemoglobin 10.4 (L) 12.0 - 16.0 g/dL    Hematocrit 33.0 (L) 35.0 - 47.0 %    MCV 93 80 - 100 fL    MCH 29.2 27.0 - 34.0 pg    MCHC 31.5 (L) 32.0 - 36.0 g/dL    RDW 13.6 11.0 - 14.5 %    Platelets 235 140 - 440 thou/uL    MPV 10.3 8.5 - 12.5 fL   Troponin I   Result Value Ref Range    Troponin I 0.02 0.00 - 0.29 ng/mL   Hepatic Profile   Result Value Ref Range    Bilirubin, Total 0.7 0.0 - 1.0 mg/dL    Bilirubin, Direct 0.2 <=0.5 mg/dL    Protein, Total 5.8 (L) 6.0 - 8.0 g/dL    Albumin 3.3 (L) 3.5 - 5.0 g/dL    Alkaline Phosphatase 96 45 - 120 U/L    AST 36 0 - 40 U/L    ALT 26 0 - 45 U/L   POCT CREATININE   Result Value Ref Range    iSTAT Creatinine 0.7 0.6 - 1.1 mg/dL    iSTAT GFR MDRD Af Amer >60 >60 mL/min/1.73m2    iSTAT GFR MDRD Non Af Amer >60 >60 mL/min/1.73m2   ECG 12 lead nursing unit performed   Result Value Ref Range    SYSTOLIC BLOOD PRESSURE      DIASTOLIC BLOOD PRESSURE      VENTRICULAR RATE 130 BPM    ATRIAL RATE 127 BPM    P-R INTERVAL      QRS DURATION 96 ms    Q-T INTERVAL 358 ms    QTC CALCULATION (BEZET) 526 ms    P Axis      R AXIS 13 degrees    T AXIS 51 degrees    MUSE DIAGNOSIS       Atrial fibrillation with rapid ventricular response  Nonspecific T wave abnormality , probably digitalis effect  Abnormal ECG  When compared with ECG of 03-NOV-2018 19:53,  Atrial fibrillation has replaced Sinus rhythm  Vent. rate has increased BY  54 BPM  ST elevation now present in Anterior leads  Confirmed by SEE ED PROVIDER NOTE FOR, ECG INTERPRETATION ([id number]),  Ashleigh Sotelo ([id number]) on 12/23/2019 12:09:52  PM     POCT Glucose - when taking PO 4x daily AC and QHS   Result Value Ref Range    Glucose 149 (H) 70 - 139 mg/dL   POCT Glucose - when NPO every 6 hours   Result Value Ref Range    Glucose 146 (H) 70 - 139 mg/dL   BNP(B-type Natriuretic Peptide)    Result Value Ref Range     (H) 0 - 167 pg/mL   Lipid Profile   Result Value Ref Range    Triglycerides 53 <=149 mg/dL    Cholesterol 125 <=199 mg/dL    LDL Calculated 60 <=129 mg/dL    HDL Cholesterol 54 >=50 mg/dL    Patient Fasting > 8hrs? Yes    TSH   Result Value Ref Range    TSH 2.30 0.30 - 5.00 uIU/mL   Glycosylated Hemoglobin A1C   Result Value Ref Range    Hemoglobin A1c 6.4 (H) 4.2 - 6.1 %   Basic Metabolic Panel   Result Value Ref Range    Sodium 144 136 - 145 mmol/L    Potassium 3.7 3.5 - 5.0 mmol/L    Chloride 112 (H) 98 - 107 mmol/L    CO2 21 (L) 22 - 31 mmol/L    Anion Gap, Calculation 11 5 - 18 mmol/L    Glucose 112 70 - 125 mg/dL    Calcium 8.2 (L) 8.5 - 10.5 mg/dL    BUN 11 8 - 28 mg/dL    Creatinine 0.69 0.60 - 1.10 mg/dL    GFR MDRD Af Amer >60 >60 mL/min/1.73m2    GFR MDRD Non Af Amer >60 >60 mL/min/1.73m2   Magnesium   Result Value Ref Range    Magnesium 1.6 (L) 1.8 - 2.6 mg/dL   POCT Glucose - when NPO every 6 hours   Result Value Ref Range    Glucose 100 70 - 139 mg/dL   Echo Complete   Result Value Ref Range    LV volume diastolic 92 46 - 106 cm3    LV volume systolic 52 (!) 14 - 42 cm3    HR 80 bpm    IVSd 0.8 0.6 - 0.9 cm    LVIDd 4.3 3.8 - 5.2 cm    LVIDs 3.2 2.2 - 3.5 cm    LVOT diam 2 cm    LVOT mean gradient 1 mmHg    LVOT peak VTI 13.3 cm    LVOT mean abiel 48.5 cm/s    LVOT peak abiel 69.9 cm/s    LVOT peak gradient 2 mmHg    LV PWd 0.9 0.6 - 0.9 cm    MV E' lat abiel 6.85 cm/s    MV E' med abiel 6.27 cm/s    AR decel slope 2,790 mm/s2    AR p 1/2 time 465 ms    AR peak abiel 421 cm/s    AV peak abiel 135 cm/s    AV mean abiel 103 cm/s    AV mean gradient 5 mmHg    AV VTI 31 cm    AO root 3.3 cm    AO ascending 3.8 cm    LA size 3.6 cm    MV decel time 120 ms    MV peak E abiel 104 cm/s    IN peak abiel 72.7 cm/s    IN peak gradient 2 mmHg    TR peak abiel 254 cm/s    LA area 2 20.2 cm2    LA area 1 22.5 cm2    LA length 5.52 cm    BSA 1.41 m2    Hieght 63 in    Weight 1,700.19 lbs    /71  mmHg    IVS/PW ratio 0.9     LV FS 25.6 28 - 44 %    Echo LVEF calculated 43 55 - 75 %    LV mass 114.2 g    AV area 1.3 cm2    LVOT area 3.14 cm2    LVOT SV 41.8 cm3    LV systolic volume index 36.9 8 - 24 cm3/m2    LV diastolic volume index 65.2 29 - 61 cm3/m2    LV mass index 81.0 g/m2    LV SVi 29.6 ml/m2    LV CO 3.3 l/min    LV Ci 2.4 l/min/m2    Height 63.0 in    Weight 106 lbs    AV DIM IND VTI 0.4     TR peak gradent 25.8 mmHg    LA volume 70.0 mL    AV DIM IND abiel 0.5     AV peak gradient 7.3 mmHg    LA volume index 49.6 mL/m2    MV med E/e' ratio 16.6     MV lat E/e' ratio 15.2     MV Avg E/e' Ratio 15.9 cm/s    AR peak gradient 70.9 mmHg    Echo LVEF Estimated 50 %   POCT Glucose - when taking PO 4x daily AC and QHS   Result Value Ref Range    Glucose 115 70 - 139 mg/dL   POCT Glucose - when NPO every 6 hours   Result Value Ref Range    Glucose 140 (H) 70 - 139 mg/dL   Potassium - Next AM   Result Value Ref Range    Potassium 3.8 3.5 - 5.0 mmol/L   Magnesium   Result Value Ref Range    Magnesium 1.8 1.8 - 2.6 mg/dL   POCT Glucose - when taking PO 4x daily AC and QHS   Result Value Ref Range    Glucose 143 (H) 70 - 139 mg/dL   POCT Glucose - when taking PO 4x daily AC and QHS   Result Value Ref Range    Glucose 124 70 - 139 mg/dL   POCT Glucose - when taking PO 4x daily AC and QHS   Result Value Ref Range    Glucose 157 (H) 70 - 139 mg/dL   POCT Glucose - when taking PO 4x daily AC and QHS   Result Value Ref Range    Glucose 176 (H) 70 - 139 mg/dL   Magnesium   Result Value Ref Range    Magnesium 1.8 1.8 - 2.6 mg/dL   HM2(CBC w/o Differential)   Result Value Ref Range    WBC 10.2 4.0 - 11.0 thou/uL    RBC 2.99 (L) 3.80 - 5.40 mill/uL    Hemoglobin 8.5 (L) 12.0 - 16.0 g/dL    Hematocrit 27.3 (L) 35.0 - 47.0 %    MCV 91 80 - 100 fL    MCH 28.4 27.0 - 34.0 pg    MCHC 31.1 (L) 32.0 - 36.0 g/dL    RDW 14.1 11.0 - 14.5 %    Platelets 169 140 - 440 thou/uL    MPV 10.3 8.5 - 12.5 fL   Basic Metabolic Panel    Result Value Ref Range    Sodium 140 136 - 145 mmol/L    Potassium 3.8 3.5 - 5.0 mmol/L    Chloride 110 (H) 98 - 107 mmol/L    CO2 23 22 - 31 mmol/L    Anion Gap, Calculation 7 5 - 18 mmol/L    Glucose 113 70 - 125 mg/dL    Calcium 7.9 (L) 8.5 - 10.5 mg/dL    BUN 24 8 - 28 mg/dL    Creatinine 0.98 0.60 - 1.10 mg/dL    GFR MDRD Af Amer >60 >60 mL/min/1.73m2    GFR MDRD Non Af Amer 53 (L) >60 mL/min/1.73m2   POCT Glucose - when taking PO 4x daily AC and QHS   Result Value Ref Range    Glucose 110 70 - 139 mg/dL   POCT Glucose   Result Value Ref Range    Glucose 205 (H) 70 - 139 mg/dL   POCT Glucose   Result Value Ref Range    Glucose 173 (H) 70 - 139 mg/dL   Magnesium   Result Value Ref Range    Magnesium 1.9 1.8 - 2.6 mg/dL   Potassium - Next AM   Result Value Ref Range    Potassium 3.6 3.5 - 5.0 mmol/L   POCT Glucose   Result Value Ref Range    Glucose 113 70 - 139 mg/dL   POCT Glucose   Result Value Ref Range    Glucose 196 (H) 70 - 139 mg/dL   POCT Glucose   Result Value Ref Range    Glucose 115 70 - 139 mg/dL   POCT Glucose   Result Value Ref Range    Glucose 212 (H) 70 - 139 mg/dL   Magnesium   Result Value Ref Range    Magnesium 1.8 1.8 - 2.6 mg/dL   Potassium - Next AM   Result Value Ref Range    Potassium 4.1 3.5 - 5.0 mmol/L   POCT Glucose   Result Value Ref Range    Glucose 101 70 - 139 mg/dL   POCT Glucose   Result Value Ref Range    Glucose 168 (H) 70 - 139 mg/dL         Assessment:  1. Ischemic cerebrovascular accident (CVA) (H) - 2018     2. Atrial fibrillation with rapid ventricular response (H)     3. Right hemiplegia (H)     4. Primary hypertension     5. Dementia without behavioral disturbance, unspecified dementia type (H)     6. B12 deficiency     7. Severe protein-calorie malnutrition ([place]: less than 60% of standard weight) (H)     8. Type 2 diabetes mellitus without complication, without long-term current use of insulin (H)         Plan:    CVA: Stable continue therapies will likely need  increased supervision and care at discharge.  Continue atorvastatin and aspirin.    Atrial fibrillation: Rate stable at this time continue metoprolol and apixaban.    Right hemiplegia: Continue therapies this may improve and she will need ongoing assistance and supervision during ambulation.    Hypertension: Stable continue metoprolol    Dementia: Will likely need 24-hour supervision at discharge.    B12 deficiency currently getting IM B12 supplements which was not on her discharge orders will consider this in the future.  Continue to monitor hemoglobin.    Malnutrition: Oral supplements twice daily.    Diabetes: Continue metformin and monitoring blood sugars.  This is stable at this time.        Electronically signed by: Aura Buck, CNP

## 2021-06-05 NOTE — TELEPHONE ENCOUNTER
ANTICOAGULATION  MANAGEMENT    Assessment     Today's INR result of 2.4 is Therapeutic (goal INR of 2.0-3.0)        Previous INR was Subtherapeutic    Warfarin given as previously instructed    No new health/diet changes affecting INR    Interaction between Tamiflu and warfarin may be affecting INR    Continues to tolerate warfarin with no reported s/s of bleeding or thromboembolism       Plan:     Warfarin Dosing Orders:  Change warfarin dose to 2 mg daily on Mon, Wed, Sat; and 1 mg daily rest of week.    Next INR: Mon 2/10.    Telephone orders given to nurseSandra.  Orders read back correctly.     David Aguilar RN    Subjective/Objective:      Lana Lantigua, a 89 y.o. female is on warfarin. Facility nurse reports for Lana:    Other anticoagulants: No    Medication changes: No     Missed warfarin doses since last INR: No     Abnormal bleeding since last INR: No    New symptoms, injury or illness: No     Upcoming surgery, procedure or cardioversion: No    Recent INR Results:    Lab Results   Component Value Date    INR 2.40 (!) 02/07/2020    INR 1.70 (!) 02/05/2020    INR 1.70 (!) 02/03/2020       Anticoagulation Episode Summary     Current INR goal:   2.0-3.0   TTR:   16.3 % (1 wk)   Next INR check:   2/10/2020   INR from last check:   2.40 (2/7/2020)   Weekly max warfarin dose:      Target end date:      INR check location:      Preferred lab:      Send INR reminders to:   [facility] (TCU/LTC/care home)    Indications    Atrial fibrillation with rapid ventricular response (H) [I48.91]  Acute deep vein thrombosis (DVT) of left femoral vein (H) [I82.412]           Comments:            Anticoagulation Care Providers     Provider Role Specialty Phone number    Edgardo Lund DO Referring Family Medicine [phone]

## 2021-06-05 NOTE — PROGRESS NOTES
[facility]    Facility:   [facility] [[id number]]   Code Status: DNR      CHIEF COMPLAINT/REASON FOR VISIT:  Chief Complaint   Patient presents with     Follow Up     rehab, cva       HISTORY:      HPI: Lana is a 89 y.o. female who I had the opportunity to visit with her and family today secondary to her hospitalization January 6 through January 10, 2020 secondary left lower extremity swelling erythema and DVT.  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  Has been performing in therapy.  Unfortunately cognition as well as insight into safety has been the issue.  They did have a care conference scheduled for today she is making some progress but also the fears once therapy stops how she is going to be independent so perhaps the idea was going to a nursing home if they want to stay home they do need a lot of family support.  Had a chance to address all that support with them today.  She does have diabetes and her blood sugars have been ranging 181-269 currently on metformin 500 mg daily we will increase that to twice daily.  Due to stroke she is also on warfarin is being managed by the Coumadin clinic.  She is not having any pain no Tylenol as needed for the pain is getting extra nutrient supplements.  She has lost about 7 pounds since being here although today she was able to finish her plate of breakfast.  She also does have B12 deficiency and apparently was supposed to get her B12 injection beginning of January and we will go ahead and add that into once a month here.  And then also recheck her hemoglobin again next week.    Past Medical History:   Diagnosis Date     B12 deficiency      Dementia (H) 12/11/2018     DNI (do not intubate) 10/17/2017     DNR (do not resuscitate) 10/17/2017     Gastric carcinoma (H)  1994     Nonsmoker      Osteoporosis      Venous insufficiency 8/14/2017     White coat hypertension              Family History   Problem Relation Age of Onset      Heart failure Father      Lung cancer Sister      Hypertension Mother      Varicose Veins Mother      Cancer Son          choriocarcinoma     Social History     Socioeconomic History     Marital status:      Spouse name: Not on file     Number of children: 7     Years of education: Not on file     Highest education level: Not on file   Occupational History     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: RETIRED   Social Needs     Financial resource strain: Not on file     Food insecurity:     Worry: Not on file     Inability: Not on file     Transportation needs:     Medical: Not on file     Non-medical: Not on file   Tobacco Use     Smoking status: Never Smoker     Smokeless tobacco: Never Used   Substance and Sexual Activity     Alcohol use: No     Drug use: No     Sexual activity: Not on file   Lifestyle     Physical activity:     Days per week: Not on file     Minutes per session: Not on file     Stress: Not on file   Relationships     Social connections:     Talks on phone: Not on file     Gets together: Not on file     Attends Confucianism service: Not on file     Active member of club or organization: Not on file     Attends meetings of clubs or organizations: Not on file     Relationship status: Not on file     Intimate partner violence:     Fear of current or ex partner: Not on file     Emotionally abused: Not on file     Physically abused: Not on file     Forced sexual activity: Not on file   Other Topics Concern     Not on file   Social History Narrative    Has resided in [place] in the past.  Generally has lived in [place], [place].  [place] in [place] at this time.        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         Review of Systems  Currently no reports of fever chills fatigue cough or cold flulike symptoms nausea vomiting diarrhea dysuria flulike symptoms headache or stiff neck.  History of A. fib left MCA CVA gastric cancer heart failure, dementia, recent left leg  DVT.      Current Outpatient Medications:      acetaminophen (TYLENOL) 500 MG tablet, Take 1 tablet (500 mg total) by mouth every 6 (six) hours as needed for pain or fever., Disp: , Rfl: 0     aspirin 81 mg chewable tablet, Chew 1 tablet (81 mg total) daily., Disp: , Rfl: 0     atorvastatin (LIPITOR) 40 MG tablet, Take 1 tablet (40 mg total) by mouth at bedtime., Disp: , Rfl: 0     bisacodyl (DULCOLAX) 10 mg suppository, Insert suppository rectally daily as needed for treatment of constipation., Disp: , Rfl: 0     melatonin 3 mg Tab tablet, Take 3 mg by mouth at bedtime., Disp: , Rfl:      metFORMIN (FORTAMET) 500 MG (OSM) 24 hr tablet, Take 1 tablet (500 mg total) by mouth daily with breakfast., Disp: , Rfl:      metoprolol tartrate 75 mg Tab, Take 75 mg by mouth 2 (two) times a day., Disp: 60 tablet, Rfl: 0     polyethylene glycol (MIRALAX) 17 gram packet, Take 1 packet (17 g total) by mouth daily as needed., Disp: , Rfl: 0     senna-docusate (PERICOLACE) 8.6-50 mg tablet, Take 1 tablet by mouth 2 (two) times a day as needed for constipation. (Patient taking differently: Take 1 tablet by mouth 2 (two) times a day. ), Disp: , Rfl: 0     traZODone (DESYREL) 50 MG tablet, Take 25 mg by mouth at bedtime., Disp: , Rfl:      triamcinolone (KENALOG) 0.1 % cream, Apply topically 2 (two) times a day. (Patient taking differently: Apply topically 2 (two) times a day. Apply to left lower extremity for dermatitis.), Disp: 454 g, Rfl: 2     warfarin ANTICOAGULANT (COUMADIN/JANTOVEN) 2 MG tablet, Take 1 tablet (2 mg total) by mouth daily., Disp: 30 tablet, Rfl: 0    Current Facility-Administered Medications:      cyanocobalamin injection 1,000 mcg, 1,000 mcg, Intramuscular, Q30 Days, [place] MD, 1,000 mcg at 12/05/19 1033    There were no vitals filed for this visit.  Blood pressure 119/71 pulse 90 respirations 18 saturation room air 97%  Physical Exam  Head is normocephalic.  Neck is supple without adenopathy .adding  in some Ace wraps.  lungs are clear throughout.  Cardiovascular does have A. fib along with 3/6 MAGDIEL.  Lower extremity edema.  Left greater than right.  Left leg with a DVT.  Musculoskeletal generalized weakness.  Sitting in a wheelchair.  Denies pain.  Psychiatric Pleasant but does have cognitive impairment.   LABS:   Lab Results   Component Value Date    WBC 9.4 01/07/2020    HGB 8.5 (L) 01/08/2020    HCT 29.3 (L) 01/07/2020    MCV 90 01/07/2020     01/08/2020         ASSESSMENT:      ICD-10-CM    1. Acute cerebrovascular accident (H) I63.9    2. Dementia without behavioral disturbance, unspecified dementia type (H) F03.90    3. B12 deficiency E53.8    4. Primary hypertension I10        PLAN:    We will reinitiate the B12 thousand micrograms injection every 30 days.  Also check her hemoglobin again next week.  Increase the metformin to 500 mg twice daily.  Had a wonderful conversation with the family regarding decision making.    For documentation purposes total visit 40 minutes which over 50% was spent with the patient and family going over her care her medications there concerns the CVA self-care deficits and medication management.        Electronically signed by: Michael Duane Johnson, CNP

## 2021-06-05 NOTE — PROGRESS NOTES
[facility]      Facility:    [facility] [[id number]]  Code Status: DNR      Chief Complaint/Reason for Visit:  Chief Complaint   Patient presents with     H & P     History of CVA with a aphasia, recent deep vein thrombosis currently on Coumadin, atrial fibrillation with rapid ventricular response, left middle cerebral artery CVA, aspiration with possible dysphasia, bilateral pleural effusions, congestive heart failure with preserved ejection fraction.       HPI:   Lana is a 89 y.o. female who was residing here at the [facility] care undergoing physical and occupational therapy secondary to multiple problems the first was a hospitalization back in December for middle cerebral artery CVA with status post TPA thrombectomy.  Did a readmission to the hospital with DVT while on apixaban and now had was switched to Coumadin.  Was then sent in the hospital from the TCU for dyspnea and hypoxia.  She was diagnosed with chronic diastolic congestive heart failure and acute hypoxic respiratory failure.  Her brain natruretic peptide was elevated and CT scan did show bilateral pleural effusions.  They continue her oral Lasix at this time and she did an ejection fraction 50%.  Cardiology recommended medical management patient did receive IV Lasix and continue her Lasix 20 mg daily.  Metoprolol was also continued.  And she did taper off the oxygen.  She did have atrial fibrillation rapid ventricular response but heart rate was controlled with metoprolol 50 mg 2 times daily.  She is anticoagulated at this time with Coumadin.    Back in December she did have a left middle cerebral artery CVA in which she does have a aphasia and dysphasia.  The question was whether or not she had aspiration pneumonia she elevated lactic acid however the procalcitonin was normal in the hospital she was on Zosyn now is finishing up on Augmentin.    Patient is currently on metformin type 2  diabetes and family is unsure where this ever came from.  Her blood sugars have been okay at this time she has been taken the medications.  I am going to hold this at this time.  She was treated appropriately and transferred here to the TCU at [facility] in stable condition.    Past Medical History:  Past Medical History:   Diagnosis Date     B12 deficiency      Dementia (H) 12/11/2018     DNI (do not intubate) 10/17/2017     DNR (do not resuscitate) 10/17/2017     Gastric carcinoma (H)  1994     Nonsmoker      Osteoporosis      Venous insufficiency 8/14/2017     White coat hypertension            Surgical History:  Past Surgical History:   Procedure Laterality Date     APPENDECTOMY  1960     CATARACT EXTRACTION Bilateral      CHOLECYSTECTOMY  1960     HYSTERECTOMY  1960     IR CEREBRAL ANGIOGRAM  12/22/2019     TONSILLECTOMY  age 20      TOTAL GASTRECTOMY  12/01/1994       Family History:   Family History   Problem Relation Age of Onset     Heart failure Father      Lung cancer Sister      Hypertension Mother      Varicose Veins Mother      Cancer Son          choriocarcinoma       Social History:    Social History     Socioeconomic History     Marital status:      Spouse name: None     Number of children: 7     Years of education: None     Highest education level: None   Occupational History     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: RETIRED   Social Needs     Financial resource strain: None     Food insecurity:     Worry: None     Inability: None     Transportation needs:     Medical: None     Non-medical: None   Tobacco Use     Smoking status: Never Smoker     Smokeless tobacco: Never Used   Substance and Sexual Activity     Alcohol use: No     Drug use: No     Sexual activity: None   Lifestyle     Physical activity:     Days per week: None     Minutes per session: None     Stress: None   Relationships     Social connections:     Talks on phone: None     Gets together: None      Attends Sabianism service: None     Active member of club or organization: None     Attends meetings of clubs or organizations: None     Relationship status: None     Intimate partner violence:     Fear of current or ex partner: None     Emotionally abused: None     Physically abused: None     Forced sexual activity: None   Other Topics Concern     None   Social History Narrative    Has resided in [place] in the past.  Generally has lived in [place], [place].  [place] in [place] at this time.        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        1/24/20: Lana resides at [facility] in [place].          Review of Systems   Constitutional:        Patient is sleeping is very poor historian review of system is done from the family.  She is comfortable however and does answer questions appropriately but I am unable to do review of systems secondary to a aphasia.       Vitals:    01/30/20 1433   BP: 106/68   Pulse: 76   Resp: 18   Temp: 97.8  F (36.6  C)   SpO2: 95%       Physical Exam  Constitutional:       Comments: Patient is lying in bed comfortably and does not appear to be in acute distress.   HENT:      Head: Normocephalic and atraumatic.      Nose: Nose normal. No congestion or rhinorrhea.   Eyes:      General:         Right eye: No discharge.         Left eye: No discharge.   Cardiovascular:      Comments: Irregularly irregular with adequate rate control.  Pulmonary:      Effort: Pulmonary effort is normal. No respiratory distress.      Breath sounds: Wheezing present.      Comments: Mild crackles were also noted at the bases.  Abdominal:      General: Abdomen is flat. Bowel sounds are normal. There is no distension.      Tenderness: There is no abdominal tenderness.   Musculoskeletal:      Right lower leg: Edema present.      Left lower leg: Edema present.   Skin:     General: Skin is warm and dry.   Neurological:      Comments: Patient is a phasic at this time but  does follow some commands.  She is able to handgrip bilateral and she is slightly weaker on the right side but can move all 4 extremities.  Cranial nerves were pretty much intact.   Psychiatric:         Mood and Affect: Mood normal.         Behavior: Behavior normal.         Medication List:  Current Outpatient Medications   Medication Sig     acetaminophen (TYLENOL) 500 MG tablet Take 1 tablet (500 mg total) by mouth every 6 (six) hours as needed for pain or fever.     albuterol (PROVENTIL) 2.5 mg /3 mL (0.083 %) nebulizer solution Take 2.5 mg by nebulization 4 (four) times a day as needed for wheezing.     aspirin 81 mg chewable tablet Chew 1 tablet (81 mg total) daily.     atorvastatin (LIPITOR) 40 MG tablet Take 1 tablet (40 mg total) by mouth at bedtime.     bisacodyl (DULCOLAX) 10 mg suppository Insert suppository rectally daily as needed for treatment of constipation.     cyanocobalamin 1,000 mcg/mL injection Inject 1,000 mcg into the shoulder, thigh, or buttocks every 30 (thirty) days.     furosemide (LASIX) 20 MG tablet Take 1 tablet (20 mg total) by mouth daily.     melatonin 3 mg Tab tablet Take 3 mg by mouth at bedtime.     metoprolol tartrate (LOPRESSOR) 50 MG tablet Take 50 mg by mouth 2 (two) times a day.     polyethylene glycol (MIRALAX) 17 gram packet Take 1 packet (17 g total) by mouth daily as needed.     senna-docusate (PERICOLACE) 8.6-50 mg tablet Take 1 tablet by mouth 2 (two) times a day as needed for constipation. (Patient taking differently: Take 1 tablet by mouth 2 (two) times a day. )     traZODone (DESYREL) 50 MG tablet Take 25 mg by mouth at bedtime.     triamcinolone (KENALOG) 0.1 % cream Apply topically 2 (two) times a day. (Patient taking differently: Apply topically 2 (two) times a day. Apply to left lower extremity for dermatitis.)     warfarin ANTICOAGULANT (COUMADIN/JANTOVEN) 1 MG tablet Take 0.5 tablets (0.5 mg total) by mouth daily. Adjust dose based on INR results.      metFORMIN (FORTAMET) 500 MG (OSM) 24 hr tablet Take 1 tablet (500 mg total) by mouth 2 (two) times a day with meals.       Labs:      Assessment:    ICD-10-CM    1. Acute deep vein thrombosis of lower leg, left (H) I82.4Z2    2. Acute cerebrovascular accident (H) I63.9    3. Atrial fibrillation with rapid ventricular response (H) I48.91    4. Chronic systolic congestive heart failure (H) I50.22    5. Primary hypertension I10    6. Aphasia R47.01    7. Dysphasia R47.02        Plan: Plan at this time will hold metformin and monitor blood sugars to see if he really needs this but will continue to check blood sugars 4 times daily.  Speech therapy will evaluate and treat and liver function test basic metabolic profile and CBC will be done tomorrow.  Also brain natruretic peptide will be done and we will continue to aggressively manage her fluid status.  Speech therapy evaluate and treat and we need a diet for this patient.  I will continue to monitor above medical problems and no other changes to care plan at this time.        Electronically signed by: Edgardo Lund DO

## 2021-06-05 NOTE — TELEPHONE ENCOUNTER
ANTICOAGULATION  MANAGEMENT    Assessment     Today's INR result of 1.7 is Subtherapeutic (goal INR of 2.0-3.0)        Previous INR was Subtherapeutic    Warfarin given as previously instructed    No new health/diet changes affecting INR    Interaction between Tamiflu and warfarin may be affecting INR- started 2/3 x14 days.    Continues to tolerate warfarin with no reported s/s of bleeding or thromboembolism       Plan:     Warfarin Dosing Orders:  Give 2 mg Wed and Thurs.    Next INR: Fri 2/7.    Telephone orders given to nurseLeah.  Orders read back correctly.     David Aguilar RN    Subjective/Objective:      Lana Lantigua, a 89 y.o. female is on warfarin. Facility nurse reports for Lana:    Other anticoagulants: No    Medication changes: No     Missed warfarin doses since last INR: No     Abnormal bleeding since last INR: No    New symptoms, injury or illness: No     Upcoming surgery, procedure or cardioversion: No    Recent INR Results:    Lab Results   Component Value Date    INR 1.70 (!) 02/05/2020    INR 1.70 (!) 02/03/2020    INR 1.90 (!) 01/31/2020       Anticoagulation Episode Summary     Current INR goal:   2.0-3.0   TTR:   0.0 % (5 d)   Next INR check:   2/7/2020   INR from last check:   1.70! (2/5/2020)   Weekly max warfarin dose:      Target end date:      INR check location:      Preferred lab:      Send INR reminders to:   [facility] (TCU/LTC/FCI)    Indications    Atrial fibrillation with rapid ventricular response (H) [I48.91]  Acute deep vein thrombosis (DVT) of left femoral vein (H) [I82.412]           Comments:            Anticoagulation Care Providers     Provider Role Specialty Phone number    Edgardo Lund DO Referring Family Medicine [phone]

## 2021-06-05 NOTE — TELEPHONE ENCOUNTER
ANTICOAGULATION  MANAGEMENT    Assessment     Today's INR result of 1.9 is Subtherapeutic (goal INR of 2.0-3.0)        Previous INR was Supratherapeutic    Warfarin held on 1/29 for supratherapeutic INR 3.04    Acute health changes, severe sepsis, may be affecting INR    No new medication/supplements affecting INR    Continues to tolerate warfarin with no reported s/s of bleeding or thromboembolism       Plan:     Warfarin Dosing Orders:  Give 1.5 mg Fri; 1 mg on Sat and Sun.    Next INR: Mon 2/3.     Telephone orders given to nurseLeah.  Orders read back correctly.     David Aguilar RN    Subjective/Objective:      Lana Lantigua, a 89 y.o. female is on warfarin. Facility nurse reports for Lana:    Other anticoagulants: No    Medication changes: No     Missed warfarin doses since last INR: No     Abnormal bleeding since last INR: No    New symptoms, injury or illness: Yes: severe sepsis     Upcoming surgery, procedure or cardioversion: No    Recent INR Results:    Lab Results   Component Value Date    INR 1.90 (!) 01/31/2020    INR 3.04 (H) 01/29/2020    INR 2.98 (H) 01/28/2020       Anticoagulation Episode Summary     Current INR goal:   2.0-3.0   TTR:   --   Next INR check:   2/3/2020   INR from last check:   1.90! (1/31/2020)   Weekly max warfarin dose:      Target end date:      INR check location:      Preferred lab:      Send INR reminders to:   [facility] (TCU/LTC/[facility])    Indications    Atrial fibrillation with rapid ventricular response (H) [I48.91]  Acute deep vein thrombosis (DVT) of left femoral vein (H) [I82.412]           Comments:            Anticoagulation Care Providers     Provider Role Specialty Phone number    Edgardo Lund DO Referring Family Medicine [phone]

## 2021-06-05 NOTE — PROGRESS NOTES
[facility]    Facility:   [facility] [[id number]]   Code Status: DNR      CHIEF COMPLAINT/REASON FOR VISIT:  Chief Complaint   Patient presents with     Follow Up     rehab, dvt       HISTORY:      HPI: Lana is a 89 y.o. female who I had the opportunity to visit with secondary to her hospitalization January 6, 2020 through January 10, 2020 secondary to left lower extremity swelling and erythema.  The ultrasound left lower extremity on January 6 did show a DVT from the popliteal to the femoral vein.  She has been on Eliquis for atrial fibrillation switch to heparin in the setting of a new DVT was bridged to warfarin.  Regarding her atrial fibrillation.  Metoprolol was increased 75 mg twice daily rather than 50 mg twice daily.  She also did receive furosemide 40 mg x 2.  She was evaluated by dietitian secondary to calorie malnutrition.  Her sodium was elevated at 147 did resolve at discharge.  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  She also does have normocytic anemia likely of chronic disease.  Had a chance to speak with her as well as her  and son today talking about her care along with her hospitalization.  We also did talk about skiing.  She did enjoy going downhill skiing.  She does have dementia and cognitive impairment working with the various therapy disciplines.  She has been normotensive and afebrile.  She does have A. fib.  Blood sugars in the morning ranging 113-130 7 in the -177 we will change up the Accu-Cheks to once daily and continue with the metformin.  For sleep she is on melatonin and trazodone.  She does need assistance with all ADLs.  She is also being followed by the Coumadin clinic.  Her left lower extremity is more swollen than the right although she does have bilateral edema.  We will add in some Ace wraps to her lower extremities initially.  Her skin is quite thin.  I hate to have a tear with the white  ANA kunz.  Otherwise her lungs remain clear.  She is able to do some conversing.    Past Medical History:   Diagnosis Date     B12 deficiency      Dementia (H) 12/11/2018     DNI (do not intubate) 10/17/2017     DNR (do not resuscitate) 10/17/2017     Gastric carcinoma (H)  1994     Nonsmoker      Osteoporosis      Venous insufficiency 8/14/2017     White coat hypertension              Family History   Problem Relation Age of Onset     Heart failure Father      Lung cancer Sister      Hypertension Mother      Varicose Veins Mother      Cancer Son          choriocarcinoma     Social History     Socioeconomic History     Marital status:      Spouse name: Not on file     Number of children: 7     Years of education: Not on file     Highest education level: Not on file   Occupational History     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: RETIRED   Social Needs     Financial resource strain: Not on file     Food insecurity:     Worry: Not on file     Inability: Not on file     Transportation needs:     Medical: Not on file     Non-medical: Not on file   Tobacco Use     Smoking status: Never Smoker     Smokeless tobacco: Never Used   Substance and Sexual Activity     Alcohol use: No     Drug use: No     Sexual activity: Not on file   Lifestyle     Physical activity:     Days per week: Not on file     Minutes per session: Not on file     Stress: Not on file   Relationships     Social connections:     Talks on phone: Not on file     Gets together: Not on file     Attends Latter day service: Not on file     Active member of club or organization: Not on file     Attends meetings of clubs or organizations: Not on file     Relationship status: Not on file     Intimate partner violence:     Fear of current or ex partner: Not on file     Emotionally abused: Not on file     Physically abused: Not on file     Forced sexual activity: Not on file   Other Topics Concern     Not on file   Social History Narrative    Has  resided in [place] in the past.  Generally has lived in [place], [place].  [place] in [place] at this time.        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         Review of Systems  Currently no reports of fever chills fatigue cough or cold flulike symptoms nausea vomiting diarrhea dysuria flulike symptoms headache or stiff neck.  History of A. fib left MCA CVA gastric cancer heart failure, dementia, recent left leg DVT.  Current Outpatient Medications   Medication Sig     acetaminophen (TYLENOL) 500 MG tablet Take 1 tablet (500 mg total) by mouth every 6 (six) hours as needed for pain or fever.     aspirin 81 mg chewable tablet Chew 1 tablet (81 mg total) daily.     atorvastatin (LIPITOR) 40 MG tablet Take 1 tablet (40 mg total) by mouth at bedtime.     bisacodyl (DULCOLAX) 10 mg suppository Insert suppository rectally daily as needed for treatment of constipation.     melatonin 3 mg Tab tablet Take 3 mg by mouth at bedtime.     metFORMIN (FORTAMET) 500 MG (OSM) 24 hr tablet Take 1 tablet (500 mg total) by mouth daily with breakfast.     metoprolol tartrate 75 mg Tab Take 75 mg by mouth 2 (two) times a day.     polyethylene glycol (MIRALAX) 17 gram packet Take 1 packet (17 g total) by mouth daily as needed.     senna-docusate (PERICOLACE) 8.6-50 mg tablet Take 1 tablet by mouth 2 (two) times a day as needed for constipation. (Patient taking differently: Take 1 tablet by mouth 2 (two) times a day. )     traZODone (DESYREL) 50 MG tablet Take 25 mg by mouth at bedtime.     triamcinolone (KENALOG) 0.1 % cream Apply topically 2 (two) times a day. (Patient taking differently: Apply topically 2 (two) times a day. Apply to left lower extremity for dermatitis.)     warfarin ANTICOAGULANT (COUMADIN/JANTOVEN) 2 MG tablet Take 1 tablet (2 mg total) by mouth daily.       There were no vitals filed for this visit.  Blood pressure 139/92 pulse 90 respirations 20 temperature 96.8  Physical Exam  Head is  normocephalic.  Neck is supple without adenopathy.adding in some Ace wraps.  lungs are clear throughout.  Cardiovascular does have A. fib along with 3/6 MAGDIEL.  Lower extremity edema.  Left greater than right.  Left leg with a DVT.  Musculoskeletal generalized weakness.  Sitting in a wheelchair.  Denies pain.  Psychiatric Pleasant but does have cognitive impairment.    LABS:   Lab Results   Component Value Date    WBC 9.4 01/07/2020    HGB 8.5 (L) 01/08/2020    HCT 29.3 (L) 01/07/2020    MCV 90 01/07/2020     01/08/2020     Results for orders placed or performed during the hospital encounter of 01/06/20   Basic Metabolic Panel   Result Value Ref Range    Sodium 145 136 - 145 mmol/L    Potassium 3.6 3.5 - 5.0 mmol/L    Chloride 115 (H) 98 - 107 mmol/L    CO2 23 22 - 31 mmol/L    Anion Gap, Calculation 7 5 - 18 mmol/L    Glucose 127 (H) 70 - 125 mg/dL    Calcium 8.0 (L) 8.5 - 10.5 mg/dL    BUN 16 8 - 28 mg/dL    Creatinine 0.66 0.60 - 1.10 mg/dL    GFR MDRD Af Amer >60 >60 mL/min/1.73m2    GFR MDRD Non Af Amer >60 >60 mL/min/1.73m2       Lab Results   Component Value Date    ALBUMIN 3.3 (L) 12/22/2019     Lab Results   Component Value Date    ALT 26 12/22/2019    AST 36 12/22/2019    ALKPHOS 96 12/22/2019    BILITOT 0.7 12/22/2019         ASSESSMENT:      ICD-10-CM    1. Acute deep vein thrombosis (DVT) of left femoral vein (H) I82.412    2. Atrial fibrillation with rapid ventricular response (H) I48.91    3. Chronic systolic congestive heart failure (H) I50.22    4. Dementia without behavioral disturbance, unspecified dementia type (H) F03.90        PLAN:    . Continue with the warfarin clinic.  Change up the Accu-Cheks to once daily rather than 4 times daily  Continue to monitor A. fib.  Continue to monitor the rehabilitation process.  Family did not have any other questions.      Electronically signed by: Michael Duane Johnson, CNP

## 2021-06-05 NOTE — PROGRESS NOTES
[facility]    [facility] [[id number]]   Code Status: DNR      CHIEF COMPLAINT/REASON FOR VISIT:  Chief Complaint   Patient presents with     Follow Up     rehab       HISTORY:      HPI: Lana is a 89 y.o. female who is in the transitional care unit secondary to her couple of hospitalizations secondary to a left leg DVT along with another hospitalization in January 24 for acute on chronic diastolic heart failure.  She is back in the transitional care to try to improve her strength and overall conditioning.  She does have cognitive impairment.  She has been normotensive and afebrile and also on room air.  His blood sugars in the morning ranging 110-120 in the p.m. 1 69- 254 she is not on any Metformin she used to be but I think at this point we will discontinue the Metformin.  She is also on warfarin secondary to the CVA which is being managed by the Coumadin clinic.  Appetite is not that great has lost some weight.  Does need assistance with all ADLs.  No colds or flus or other problems.    Past Medical History:   Diagnosis Date     B12 deficiency      Dementia (H) 12/11/2018     DNI (do not intubate) 10/17/2017     DNR (do not resuscitate) 10/17/2017     Gastric carcinoma (H)  1994     Nonsmoker      Osteoporosis      Venous insufficiency 8/14/2017     White coat hypertension              Family History   Problem Relation Age of Onset     Heart failure Father      Lung cancer Sister      Hypertension Mother      Varicose Veins Mother      Cancer Son          choriocarcinoma     Social History     Socioeconomic History     Marital status:      Spouse name: Not on file     Number of children: 7     Years of education: Not on file     Highest education level: Not on file   Occupational History     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: RETIRED   Social Needs     Financial resource strain: Not on file     Food insecurity:     Worry: Not on file      Inability: Not on file     Transportation needs:     Medical: Not on file     Non-medical: Not on file   Tobacco Use     Smoking status: Never Smoker     Smokeless tobacco: Never Used   Substance and Sexual Activity     Alcohol use: No     Drug use: No     Sexual activity: Not on file   Lifestyle     Physical activity:     Days per week: Not on file     Minutes per session: Not on file     Stress: Not on file   Relationships     Social connections:     Talks on phone: Not on file     Gets together: Not on file     Attends Oriental orthodox service: Not on file     Active member of club or organization: Not on file     Attends meetings of clubs or organizations: Not on file     Relationship status: Not on file     Intimate partner violence:     Fear of current or ex partner: Not on file     Emotionally abused: Not on file     Physically abused: Not on file     Forced sexual activity: Not on file   Other Topics Concern     Not on file   Social History Narrative    Has resided in [place] in the past.  Generally has lived in [place], [place].  [place] in [place] at this time.        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        1/24/20: Lana resides at [facility] in [place].         Review of Systems  Currently no reports of fever chills fatigue cough or cold flulike symptoms nausea vomiting diarrhea dysuria flulike symptoms headache or stiff neck.  History of A. fib left MCA CVA gastric cancer heart failure, dementia, recent left leg DVT.    Current Outpatient Medications   Medication Sig     acetaminophen (TYLENOL) 500 MG tablet Take 1 tablet (500 mg total) by mouth every 6 (six) hours as needed for pain or fever.     albuterol (PROVENTIL) 2.5 mg /3 mL (0.083 %) nebulizer solution Take 2.5 mg by nebulization 4 (four) times a day as needed for wheezing.     aspirin 81 mg chewable tablet Chew 1 tablet (81 mg total) daily.     atorvastatin (LIPITOR) 40 MG tablet Take 1 tablet (40  mg total) by mouth at bedtime.     bisacodyl (DULCOLAX) 10 mg suppository Insert suppository rectally daily as needed for treatment of constipation.     cyanocobalamin 1,000 mcg/mL injection Inject 1,000 mcg into the shoulder, thigh, or buttocks every 30 (thirty) days.     furosemide (LASIX) 20 MG tablet Take 1 tablet (20 mg total) by mouth daily.     melatonin 3 mg Tab tablet Take 3 mg by mouth at bedtime.     metFORMIN (FORTAMET) 500 MG (OSM) 24 hr tablet Take 1 tablet (500 mg total) by mouth 2 (two) times a day with meals.     metoprolol tartrate (LOPRESSOR) 50 MG tablet Take 50 mg by mouth 2 (two) times a day.     polyethylene glycol (MIRALAX) 17 gram packet Take 1 packet (17 g total) by mouth daily as needed.     senna-docusate (PERICOLACE) 8.6-50 mg tablet Take 1 tablet by mouth 2 (two) times a day as needed for constipation. (Patient taking differently: Take 1 tablet by mouth 2 (two) times a day. )     traZODone (DESYREL) 50 MG tablet Take 25 mg by mouth at bedtime.     triamcinolone (KENALOG) 0.1 % cream Apply topically 2 (two) times a day. (Patient taking differently: Apply topically 2 (two) times a day. Apply to left lower extremity for dermatitis.)     warfarin ANTICOAGULANT (COUMADIN/JANTOVEN) 1 MG tablet Take 0.5 tablets (0.5 mg total) by mouth daily. Adjust dose based on INR results.       There were no vitals filed for this visit.  Blood pressure 103/64 pulse 99 respirations 18 temperature 97.5 saturation room air 99% weight 93 pounds her weight on January 19 100 pounds  Physical Exam  Head is normocephalic.  Neck is supple without adenopathy lungs are clear throughout.  Cardiovascular does have A. fib along with 3/6 MAGDIEL.  Lower extremity edema.  Left greater than right.  Left leg with a DVT.  Musculoskeletal generalized weakness.   Denies pain.  Psychiatric Pleasant but does have cognitive impairment.     LABS:   Lab Results   Component Value Date    WBC 8.8 01/31/2020    HGB 9.0 (L) 01/31/2020     HCT 30.5 (L) 01/31/2020    MCV 86 01/31/2020     01/31/2020     Lab Results   Component Value Date    ALBUMIN 2.5 (L) 01/31/2020         ASSESSMENT:      ICD-10-CM    1. Acute cerebrovascular accident (H) I63.9    2. Aphasia R47.01    3. Oropharyngeal dysphagia R13.12    4. Severe protein-calorie malnutrition (H) E43        PLAN:    No other new changes.  Vital signs remained stable does have protein malnutrition is losing weight.  Discontinue the blood sugars and keep her off the Metformin.  She does look comfortable.  She is on a level 2 diet due to dysphagia.      Electronically signed by: Michael Duane Johnson, CNP

## 2021-06-05 NOTE — TELEPHONE ENCOUNTER
ANTICOAGULATION  MANAGEMENT    Assessment     Today's INR result of 3.0 is Therapeutic (goal INR of 2.0-3.0)        Previous INR was Supratherapeutic    Warfarin held as instructed    No new health/diet changes affecting INR    No new medication/supplements affecting INR    Continues to tolerate warfarin with no reported s/s of bleeding or thromboembolism       Plan:     Warfarin Dosing Orders: Give 1 mg today.    Next INR: tmr 1/17.    Telephone orders given to nurseJyothi.  Orders read back correctly.     David Aguilar RN    Subjective/Objective:      Lana Lantigua, a 89 y.o. female is on warfarin. Facility nurse reports for Lana:    Other anticoagulants: No    Medication changes: No     Missed warfarin doses since last INR: No     Abnormal bleeding since last INR: No    New symptoms, injury or illness: No     Upcoming surgery, procedure or cardioversion: No    Recent INR Results:    Lab Results   Component Value Date    INR 3.00 (!) 01/16/2020    INR 3.70 (!) 01/15/2020    INR 4.60 (!) 01/14/2020       Anticoagulation Episode Summary     Current INR goal:   2.0-3.0   TTR:   --   Next INR check:   1/17/2020   INR from last check:   3.00 (1/16/2020)   Weekly max warfarin dose:      Target end date:      INR check location:      Preferred lab:      Send INR reminders to:   [facility] (TCU/LTC/nursing home)    Indications    Atrial fibrillation with rapid ventricular response (H) [I48.91]  Acute deep vein thrombosis (DVT) of left femoral vein (H) [I82.412]           Comments:            Anticoagulation Care Providers     Provider Role Specialty Phone number    Edgardo Lund DO [facility] [phone]

## 2021-06-05 NOTE — PROGRESS NOTES
[facility]    Facility:   [facility] [[id number]]   Code Status: DNR  PCP: George Park MD   Phone: [phone]   Fax: [phone]      CHIEF COMPLAINT/REASON FOR VISIT:  Chief Complaint   Patient presents with     Discharge Summary       HISTORY COURSE:  Lana is a 89 y.o. female who I had the opportunity to revisit with secondary to her hospitalization most recently January 24through January 29, 2020 secondary to dyspnea and hypoxia.  She was diagnosed with acute on chronic diastolic congestive heart failure with a BNP of 1202 which was up from 692 from December 22, 2019.  The CAT scan did show bilateral pleural effusions of lower lobes.  The lower extremity edema did worsen.  She was hypoxic with 88% on room air.  Symptoms also seem to worsen after she was given fluids per sepsis protocol.  She was diuresed with furosemide.  Regarding her atrial fibrillation she did have a diltiazem bolus and continue with warfarin.  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  They can continue with the atorvastatin and aspirin.  She does have a history of dysphasia and I did request speech therapy to evaluate.  Has a history of recent left leg DVT.  The goals of care pretty much comfort.  With a history of hyponatremia the sodium level remained at 147.  Prior to that she was hospitalizedJanuary 6, 2020 through January 10, 2020 secondary to left lower extremity swelling and erythema.  The ultrasound left lower extremity on January 6 did show a DVT from the popliteal to the femoral vein.  She has been on Eliquis for atrial fibrillation switch to heparin in the setting of a new DVT was bridged to warfarin.  Regarding her atrial fibrillation.  Metoprolol was increased 75 mg twice daily rather than 50 mg twice daily.  She also did receive furosemide 40 mg x 2.  She was evaluated by dietitian secondary to calorie malnutrition.  Her sodium was elevated at  147 did resolve at discharge.  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  She also does have normocytic anemia likely of chronic disease.  She has been able to participate with rehabilitation services unfortunately in the services was limited due to cognition as well as generalized weakness.  She has been working with speech therapy as well regarding swallowing and to ensure that there were no aspiration issues.  She does have B12 deficiency there is a B12 ordered for today the results not yet back by this dictation see results below her most recent laboratory studies.  Her albumin was at 2.5 and is getting extra nutrient supplements.  She has been normotensive and afebrile and also on room air.  Not requiring any nebs or other pulmonary intervention.  For sleep she is on both melatonin and trazodone which has been helpful.  She is also on warfarin which is being managed by the Coumadin clinic.  She is also on Tamiflu prophylactically 75 mg daily and the last dose will be February 16.  Review of Systems  Currently no reports of fever chills fatigue cough or cold flulike symptoms nausea vomiting diarrhea dysuria flulike symptoms headache or stiff neck.  History of A. fib left MCA CVA gastric cancer heart failure, dementia, recent left leg DVT.     There were no vitals filed for this visit.  Blood pressure 117/72 pulse 95 temperature 98.1 saturation room air 96% weight 95 pounds her weight on January 31 was 99 pounds  Physical Exam   Head is normocephalic.  Neck is supple without adenopathy lungs are clear throughout.  Cardiovascular does have A. fib along with 3/6 MAGDIEL.  no significant edema.  Musculoskeletal generalized weakness.   Denies pain.  Psychiatric Pleasant but does have cognitive impairment. Aphasia.   Lab Results   Component Value Date    WBC 8.8 01/31/2020    HGB 9.0 (L) 01/31/2020    HCT 30.5 (L) 01/31/2020    MCV 86 01/31/2020     01/31/2020     Results for orders  placed or performed in visit on 01/31/20   Basic Metabolic Panel   Result Value Ref Range    Sodium 141 136 - 145 mmol/L    Potassium 3.6 3.5 - 5.0 mmol/L    Chloride 103 98 - 107 mmol/L    CO2 30 22 - 31 mmol/L    Anion Gap, Calculation 8 5 - 18 mmol/L    Glucose 77 70 - 125 mg/dL    Calcium 8.1 (L) 8.5 - 10.5 mg/dL    BUN 18 8 - 28 mg/dL    Creatinine 0.66 0.60 - 1.10 mg/dL    GFR MDRD Af Amer >60 >60 mL/min/1.73m2    GFR MDRD Non Af Amer >60 >60 mL/min/1.73m2       Lab Results   Component Value Date    OQNDKRGD67 613 11/29/2019     Lab Results   Component Value Date    IRON 94 06/04/2018    FERRITIN 25 06/04/2018     Lab Results   Component Value Date    ALBUMIN 2.5 (L) 01/31/2020     Results for orders placed or performed in visit on 01/31/20   Basic Metabolic Panel   Result Value Ref Range    Sodium 141 136 - 145 mmol/L    Potassium 3.6 3.5 - 5.0 mmol/L    Chloride 103 98 - 107 mmol/L    CO2 30 22 - 31 mmol/L    Anion Gap, Calculation 8 5 - 18 mmol/L    Glucose 77 70 - 125 mg/dL    Calcium 8.1 (L) 8.5 - 10.5 mg/dL    BUN 18 8 - 28 mg/dL    Creatinine 0.66 0.60 - 1.10 mg/dL    GFR MDRD Af Amer >60 >60 mL/min/1.73m2    GFR MDRD Non Af Amer >60 >60 mL/min/1.73m2         MEDICATION LIST:  Current Outpatient Medications   Medication Sig     acetaminophen (TYLENOL) 500 MG tablet Take 1 tablet (500 mg total) by mouth every 6 (six) hours as needed for pain or fever.     aspirin 81 mg chewable tablet Chew 1 tablet (81 mg total) daily.     atorvastatin (LIPITOR) 40 MG tablet Take 1 tablet (40 mg total) by mouth at bedtime.     cyanocobalamin 1,000 mcg/mL injection Inject 1,000 mcg into the shoulder, thigh, or buttocks every 30 (thirty) days.     furosemide (LASIX) 20 MG tablet Take 1 tablet (20 mg total) by mouth daily.     melatonin 3 mg Tab tablet Take 3 mg by mouth at bedtime.     metoprolol tartrate (LOPRESSOR) 50 MG tablet Take 50 mg by mouth 2 (two) times a day.     traZODone (DESYREL) 50 MG tablet Take 25 mg by  mouth at bedtime.     triamcinolone (KENALOG) 0.1 % cream Apply topically 2 (two) times a day. (Patient taking differently: Apply topically 2 (two) times a day. Apply to left lower extremity for dermatitis.)     warfarin ANTICOAGULANT (COUMADIN/JANTOVEN) 1 MG tablet Take 0.5 tablets (0.5 mg total) by mouth daily. Adjust dose based on INR results.       DISCHARGE DIAGNOSIS:    ICD-10-CM    1. Acute cerebrovascular accident (H) I63.9    2. Aphasia R47.01    3. B12 deficiency E53.8    4. Dementia without behavioral disturbance, unspecified dementia type (H) F03.90        MEDICAL EQUIPMENT NEEDS:  None    DISCHARGE PLAN/FACE TO FACE:  I certify that services are/were furnished while this patient was under the care of a physician and that a physician or an allowed non-physician practitioner (NPP), had a face-to-face encounter that meets the physician face-to-face encounter requirements. The encounter was in whole, or in part, related to the primary reason for home health. The patient is confined to his/her home and needs intermittent skilled nursing, physical therapy, speech-language pathology, or the continued need for occupational therapy. A plan of care has been established by a physician and is periodically reviewed by a physician.  Date of Face-to-Face Encounter: February 7, 2020    I certify that, based on my findings, the following services are medically necessary home health services: She will be discharging to home with current medications with an anticipated discharge date of February 7, 2020 she will also receive physical and occupational therapy home health aide nursing and .  Go home.  She has not yet been identified.    My clinical findings support the need for the above skilled services because: (Please write a brief narrative summary that describes what the RN, PT, SLP, or other services will be doing in the home. A list of diagnoses in this section does not meet the CMS requirements.)  She  will require all the skilled services secondary to her hospitalizations along with generalized weakness dementia history of CVA history of gastric cancer heart failure along with exercises home safety transfers self-care deficits medication management and  for community support    This patient is homebound because: (Please write a brief narrative summary describing the functional limitations as to why this patient is homebound and specifically what makes this patient homebound.)  Secondary to multiple chronic medical conditions including her recent hospitalization but will also require assistance with basic ADLs home safety transfers medication management assessments and  for community support.    The patient is, or has been, under my care and I have initiated the establishment of the plan of care. This patient will be followed by a physician who will periodically review the plan of care.    Schedule follow up visit with primary care provider within 7 days to reestablish care.  She will follow-up with her primary care doctor regarding medication management and her chronic medical conditions.  See results above her most recent laboratory studies are still a B12 awaiting for today's level.  She is also on Coumadin and is being currently managed by the warfarin clinic.    Discharge coordination care greater than 30 minutes  Electronically signed by: Michael Duane Johnson, CNP

## 2021-06-05 NOTE — PROGRESS NOTES
[facility]    Facility:   [facility] [[id number]]   Code Status: DNR      CHIEF COMPLAINT/REASON FOR VISIT:  Chief Complaint   Patient presents with     Follow Up     rehab, dvt, weak, dem       HISTORY:      HPI: Lana is a 89 y.o. female who is in the transitional care unit plus had the opportunity to revisit with her secondary to her hospitalization for left leg DVT in January sixth and then a hospitalization January 24 for acute on chronic diastolic heart failure.  She currently does have cognitive impairment and does have a history of A. fib which is being managed by the Coumadin clinic.  She does have dysphasia she has not had any swallowing episodes has been working with speech therapy.  She does take trazodone and melatonin for sleep.  She has been normotensive and afebrile.  Getting extra nutrient supplements.  Lungs have remained clear.  No lower extremity edema.  She also has Tamiflu prophylactically from February 3 through February 16    Past Medical History:   Diagnosis Date     B12 deficiency      Dementia (H) 12/11/2018     DNI (do not intubate) 10/17/2017     DNR (do not resuscitate) 10/17/2017     Gastric carcinoma (H)  1994     Nonsmoker      Osteoporosis      Venous insufficiency 8/14/2017     White coat hypertension              Family History   Problem Relation Age of Onset     Heart failure Father      Lung cancer Sister      Hypertension Mother      Varicose Veins Mother      Cancer Son          choriocarcinoma     Social History     Socioeconomic History     Marital status:      Spouse name: Not on file     Number of children: 7     Years of education: Not on file     Highest education level: Not on file   Occupational History     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: RETIRED   Social Needs     Financial resource strain: Not on file     Food insecurity:     Worry: Not on file     Inability: Not on file     Transportation needs:      Medical: Not on file     Non-medical: Not on file   Tobacco Use     Smoking status: Never Smoker     Smokeless tobacco: Never Used   Substance and Sexual Activity     Alcohol use: No     Drug use: No     Sexual activity: Not on file   Lifestyle     Physical activity:     Days per week: Not on file     Minutes per session: Not on file     Stress: Not on file   Relationships     Social connections:     Talks on phone: Not on file     Gets together: Not on file     Attends Gnosticist service: Not on file     Active member of club or organization: Not on file     Attends meetings of clubs or organizations: Not on file     Relationship status: Not on file     Intimate partner violence:     Fear of current or ex partner: Not on file     Emotionally abused: Not on file     Physically abused: Not on file     Forced sexual activity: Not on file   Other Topics Concern     Not on file   Social History Narrative    Has resided in [place] in the past.  Generally has lived in [place], [place].  [place] in [place] at this time.        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        1/24/20: Lana resides at [facility] in [place].         Review of Systems  Currently no reports of fever chills fatigue cough or cold flulike symptoms nausea vomiting diarrhea dysuria flulike symptoms headache or stiff neck.  History of A. fib left MCA CVA gastric cancer heart failure, dementia, recent left leg DVT.         Current Outpatient Medications:      acetaminophen (TYLENOL) 500 MG tablet, Take 1 tablet (500 mg total) by mouth every 6 (six) hours as needed for pain or fever., Disp: , Rfl: 0     albuterol (PROVENTIL) 2.5 mg /3 mL (0.083 %) nebulizer solution, Take 2.5 mg by nebulization 4 (four) times a day as needed for wheezing., Disp: , Rfl:      aspirin 81 mg chewable tablet, Chew 1 tablet (81 mg total) daily., Disp: , Rfl: 0     atorvastatin (LIPITOR) 40 MG tablet, Take 1 tablet (40 mg total) by  mouth at bedtime., Disp: , Rfl: 0     bisacodyl (DULCOLAX) 10 mg suppository, Insert suppository rectally daily as needed for treatment of constipation., Disp: , Rfl: 0     cyanocobalamin 1,000 mcg/mL injection, Inject 1,000 mcg into the shoulder, thigh, or buttocks every 30 (thirty) days., Disp: , Rfl:      furosemide (LASIX) 20 MG tablet, Take 1 tablet (20 mg total) by mouth daily., Disp: , Rfl: 0     melatonin 3 mg Tab tablet, Take 3 mg by mouth at bedtime., Disp: , Rfl:      metFORMIN (FORTAMET) 500 MG (OSM) 24 hr tablet, Take 1 tablet (500 mg total) by mouth 2 (two) times a day with meals., Disp: , Rfl:      metoprolol tartrate (LOPRESSOR) 50 MG tablet, Take 50 mg by mouth 2 (two) times a day., Disp: , Rfl:      polyethylene glycol (MIRALAX) 17 gram packet, Take 1 packet (17 g total) by mouth daily as needed., Disp: , Rfl: 0     senna-docusate (PERICOLACE) 8.6-50 mg tablet, Take 1 tablet by mouth 2 (two) times a day as needed for constipation. (Patient taking differently: Take 1 tablet by mouth 2 (two) times a day. ), Disp: , Rfl: 0     traZODone (DESYREL) 50 MG tablet, Take 25 mg by mouth at bedtime., Disp: , Rfl:      triamcinolone (KENALOG) 0.1 % cream, Apply topically 2 (two) times a day. (Patient taking differently: Apply topically 2 (two) times a day. Apply to left lower extremity for dermatitis.), Disp: 454 g, Rfl: 2     warfarin ANTICOAGULANT (COUMADIN/JANTOVEN) 1 MG tablet, Take 0.5 tablets (0.5 mg total) by mouth daily. Adjust dose based on INR results., Disp: , Rfl: 0    There were no vitals filed for this visit.     Blood pressure 97/61 pulse 88 temperature 98.2 saturation room air 96%   Physical Exam   Head is normocephalic.  Neck is supple without adenopathy lungs are clear throughout.  Cardiovascular does have A. fib along with 3/6 MAGDIEL.  Lower extremity edema.  Left greater than right.  Left leg with a DVT.  Musculoskeletal generalized weakness.   Denies pain.  Psychiatric Pleasant but does have  cognitive impairment.     LABS:   Lab Results   Component Value Date    WBC 8.8 01/31/2020    HGB 9.0 (L) 01/31/2020    HCT 30.5 (L) 01/31/2020    MCV 86 01/31/2020     01/31/2020     Results for orders placed or performed in visit on 01/31/20   Basic Metabolic Panel   Result Value Ref Range    Sodium 141 136 - 145 mmol/L    Potassium 3.6 3.5 - 5.0 mmol/L    Chloride 103 98 - 107 mmol/L    CO2 30 22 - 31 mmol/L    Anion Gap, Calculation 8 5 - 18 mmol/L    Glucose 77 70 - 125 mg/dL    Calcium 8.1 (L) 8.5 - 10.5 mg/dL    BUN 18 8 - 28 mg/dL    Creatinine 0.66 0.60 - 1.10 mg/dL    GFR MDRD Af Amer >60 >60 mL/min/1.73m2    GFR MDRD Non Af Amer >60 >60 mL/min/1.73m2           ASSESSMENT:      ICD-10-CM    1. Severe protein-calorie malnutrition (H) E43    2. Oropharyngeal dysphagia R13.12    3. Ischemic cerebrovascular accident (CVA) (H) - 2018 I63.9    4. Gastric carcinoma (H) C16.9        PLAN:    No further changes at this time.  Looks like there is a potential for discharge by this weekend.  Nutritional wise she is getting extra nutrient supplements.  From a heart standpoint she has had lungs that have been clear and no lower extremity edema.  Continue to manage and follow.      Electronically signed by: Michael Duane Johnson, CNP

## 2021-06-05 NOTE — TELEPHONE ENCOUNTER
TONO Santiago has been managing patient's warfarin during her stay at [facility]. She had switched from Eliquis to warfarin in January for DVT.     Now that patient has been discharged home, can AC program continue to manage her under your name?

## 2021-06-05 NOTE — TELEPHONE ENCOUNTER
ANTICOAGULATION  MANAGEMENT    Assessment     Today's INR result of 2.4 is Therapeutic (goal INR of 2.0-3.0)        Previous INR was Therapeutic    Warfarin given as previously instructed    Diet change, decreased intake, may be affecting INR - had a choking episode the other day    No new medication/supplements affecting INR     Nurse reports diminished lung sounds, they will be gettign chest xray today still    Continues to tolerate warfarin with no reported s/s of bleeding or thromboembolism       Plan:     Warfarin Dosing Orders:   Take 1.5 mg today, 1 mg on Sat and Sun    Next INR: Mon 1/27    Telephone orders given to nurseCarmen.  Orders read back correctly.     Carmen Crespo RN    Subjective/Objective:      Lana Lantigua, a 89 y.o. female is on warfarin. Facility nurse reports for Lana:    Other anticoagulants: No    Medication changes: No     Missed warfarin doses since last INR: No     Abnormal bleeding since last INR: No    New symptoms, injury or illness: Yes: diminished lung sounds, concern for illness or possibility that recent DVT broke off causing PE     Upcoming surgery, procedure or cardioversion: No    Recent INR Results:    Lab Results   Component Value Date    INR 2.40 (!) 01/24/2020    INR 3.00 (!) 01/22/2020    INR 4.50 (!) 01/21/2020       Anticoagulation Episode Summary     Current INR goal:   2.0-3.0   TTR:   --   Next INR check:   1/27/2020   INR from last check:   2.40 (1/24/2020)   Weekly max warfarin dose:      Target end date:      INR check location:      Preferred lab:      Send INR reminders to:   [facility] (TCU/LTC/[facility])    Indications    Atrial fibrillation with rapid ventricular response (H) [I48.91]  Acute deep vein thrombosis (DVT) of left femoral vein (H) [I82.412]           Comments:            Anticoagulation Care Providers     Provider Role Specialty Phone number    Edgardo Lund DO [facility] [phone]

## 2021-06-05 NOTE — TELEPHONE ENCOUNTER
ANTICOAGULATION  MANAGEMENT    Assessment     Today's INR result of 4.5 is Supratherapeutic (goal INR of 2.0-3.0)        Previous INR was Supratherapeutic    Warfarin given as previously instructed    No new health/diet changes affecting INR    No new medication/supplements affecting INR    Continues to tolerate warfarin with no reported s/s of bleeding or thromboembolism       Plan:     Warfarin Dosing Orders:  Hold today.    Next INR: tmr 1/22.    Telephone orders given to nurseCamila.  Orders read back correctly.     David Aguilar RN    Subjective/Objective:      Lana Lantigua, a 89 y.o. female is on warfarin. Facility nurse reports for Lana:    Other anticoagulants: No    Medication changes: No     Missed warfarin doses since last INR: No     Abnormal bleeding since last INR: No    New symptoms, injury or illness: No     Upcoming surgery, procedure or cardioversion: No    Recent INR Results:    Lab Results   Component Value Date    INR 4.50 (!) 01/21/2020    INR 4.00 (!) 01/20/2020    INR 2.50 (!) 01/17/2020       Anticoagulation Episode Summary     Current INR goal:   2.0-3.0   TTR:   --   Next INR check:   1/22/2020   INR from last check:   4.50! (1/21/2020)   Weekly max warfarin dose:      Target end date:      INR check location:      Preferred lab:      Send INR reminders to:   [facility] (TCU/LTC/snf)    Indications    Atrial fibrillation with rapid ventricular response (H) [I48.91]  Acute deep vein thrombosis (DVT) of left femoral vein (H) [I82.412]           Comments:            Anticoagulation Care Providers     Provider Role Specialty Phone number    Edgardo Lund DO [facility] [phone]

## 2021-06-05 NOTE — PROGRESS NOTES
[facility]    Facility:   [facility] [[id number]]   Code Status: DNR      CHIEF COMPLAINT/REASON FOR VISIT:  Chief Complaint   Patient presents with     Follow Up     rehab, legs,        HISTORY:      HPI: Lana is a 89 y.o. female who is on the transitional care unit secondary to her hospitalization January 6, 2020 through January 10, 2020 secondary to left lower extremity swelling and erythema and the ultrasound did show a DVT.  She currently is actually doing quite well.  She is post have Ace wrap for lower extremity edema.  She is cognitively impaired the  does do a good job of helping interpreting what she might need.  She overall is not having any significant pain.  She did require one Tylenol on the 15th.  She recently did complete some therapy.  She seems to be doing pretty good work while in therapy.  She is on metformin secondary to diabetes.  There was an Accu-Chek performed yesterday at 269 we will keep an eye on that before making any other further intervention.  For sleep she is on trazodone and melatonin.  She is also on warfarin secondary to the DVT and is being followed by the Coumadin clinic.  Getting extra nutrient supplements.  We also had a chance to talk about the current weather coming in as well as the days of Wednesday lived in their house.    Past Medical History:   Diagnosis Date     B12 deficiency      Dementia (H) 12/11/2018     DNI (do not intubate) 10/17/2017     DNR (do not resuscitate) 10/17/2017     Gastric carcinoma (H)  1994     Nonsmoker      Osteoporosis      Venous insufficiency 8/14/2017     White coat hypertension              Family History   Problem Relation Age of Onset     Heart failure Father      Lung cancer Sister      Hypertension Mother      Varicose Veins Mother      Cancer Son          choriocarcinoma     Social History     Socioeconomic History     Marital status:      Spouse name: Not on file     Number of  children: 7     Years of education: Not on file     Highest education level: Not on file   Occupational History     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: RETIRED   Social Needs     Financial resource strain: Not on file     Food insecurity:     Worry: Not on file     Inability: Not on file     Transportation needs:     Medical: Not on file     Non-medical: Not on file   Tobacco Use     Smoking status: Never Smoker     Smokeless tobacco: Never Used   Substance and Sexual Activity     Alcohol use: No     Drug use: No     Sexual activity: Not on file   Lifestyle     Physical activity:     Days per week: Not on file     Minutes per session: Not on file     Stress: Not on file   Relationships     Social connections:     Talks on phone: Not on file     Gets together: Not on file     Attends Pentecostal service: Not on file     Active member of club or organization: Not on file     Attends meetings of clubs or organizations: Not on file     Relationship status: Not on file     Intimate partner violence:     Fear of current or ex partner: Not on file     Emotionally abused: Not on file     Physically abused: Not on file     Forced sexual activity: Not on file   Other Topics Concern     Not on file   Social History Narrative    Has resided in [place] in the past.  Generally has lived in [place], [place].  [place] in [place] at this time.        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         Review of Systems  Currently no reports of fever chills fatigue cough or cold flulike symptoms nausea vomiting diarrhea dysuria flulike symptoms headache or stiff neck.  History of A. fib left MCA CVA gastric cancer heart failure, dementia, recent left leg DVT.  Current Outpatient Medications   Medication Sig     acetaminophen (TYLENOL) 500 MG tablet Take 1 tablet (500 mg total) by mouth every 6 (six) hours as needed for pain or fever.     aspirin 81 mg chewable tablet Chew 1 tablet (81 mg total) daily.      atorvastatin (LIPITOR) 40 MG tablet Take 1 tablet (40 mg total) by mouth at bedtime.     bisacodyl (DULCOLAX) 10 mg suppository Insert suppository rectally daily as needed for treatment of constipation.     melatonin 3 mg Tab tablet Take 3 mg by mouth at bedtime.     metFORMIN (FORTAMET) 500 MG (OSM) 24 hr tablet Take 1 tablet (500 mg total) by mouth daily with breakfast.     metoprolol tartrate 75 mg Tab Take 75 mg by mouth 2 (two) times a day.     polyethylene glycol (MIRALAX) 17 gram packet Take 1 packet (17 g total) by mouth daily as needed.     senna-docusate (PERICOLACE) 8.6-50 mg tablet Take 1 tablet by mouth 2 (two) times a day as needed for constipation. (Patient taking differently: Take 1 tablet by mouth 2 (two) times a day. )     traZODone (DESYREL) 50 MG tablet Take 25 mg by mouth at bedtime.     triamcinolone (KENALOG) 0.1 % cream Apply topically 2 (two) times a day. (Patient taking differently: Apply topically 2 (two) times a day. Apply to left lower extremity for dermatitis.)     warfarin ANTICOAGULANT (COUMADIN/JANTOVEN) 2 MG tablet Take 1 tablet (2 mg total) by mouth        There were no vitals filed for this visit.  Blood pressure 136/89 pulse 89 weight 107 pounds  Physical Exam    Head is normocephalic.  Neck is supple without adenopathy.adding in some Ace wraps.  lungs are clear throughout.  Cardiovascular does have A. fib along with 3/6 MAGDIEL.  Lower extremity edema.  Left greater than right.  Left leg with a DVT.  Musculoskeletal generalized weakness.  Sitting in a wheelchair.  Denies pain.  Psychiatric Pleasant but does have cognitive impairment.  LABS:   Lab Results   Component Value Date    WBC 9.4 01/07/2020    HGB 8.5 (L) 01/08/2020    HCT 29.3 (L) 01/07/2020    MCV 90 01/07/2020     01/08/2020     Results for orders placed or performed in visit on 01/15/20   Basic Metabolic Panel   Result Value Ref Range    Sodium 142 136 - 145 mmol/L    Potassium 3.7 3.5 - 5.0 mmol/L    Chloride  106 98 - 107 mmol/L    CO2 28 22 - 31 mmol/L    Anion Gap, Calculation 8 5 - 18 mmol/L    Glucose 104 70 - 125 mg/dL    Calcium 8.5 8.5 - 10.5 mg/dL    BUN 16 8 - 28 mg/dL    Creatinine 0.72 0.60 - 1.10 mg/dL    GFR MDRD Af Amer >60 >60 mL/min/1.73m2    GFR MDRD Non Af Amer >60 >60 mL/min/1.73m2       Lab Results   Component Value Date    ALBUMIN 3.3 (L) 12/22/2019         ASSESSMENT:      ICD-10-CM    1. Chronic systolic congestive heart failure (H) I50.22    2. Atrial fibrillation with rapid ventricular response (H) I48.91    3. History of CVA (cerebrovascular accident) Z86.73    4. Primary hypertension I10        PLAN:     she is supposed to be wearing her Ace wrap she currently does not have any on her legs.  She has been stable.  Is participating in therapy.  We will keep a close eye on the blood sugars as well as the blood pressures as well as her weight and nutritional intake.  They did not have any other questions.      Electronically signed by: Michael Duane Johnson, CNP

## 2021-06-05 NOTE — TELEPHONE ENCOUNTER
ANTICOAGULATION  MANAGEMENT    Assessment     Today's INR result of 3.0 is Therapeutic (goal INR of 2.0-3.0)        Previous INR was Supratherapeutic    Warfarin held as instructed.    No new health/diet changes affecting INR    No new medication/supplements affecting INR    Continues to tolerate warfarin with no reported s/s of bleeding or thromboembolism       Plan:     Warfarin Dosing Orders:  1 mg daily.    Next INR: Fri 1/24.    Telephone orders given to nurseCarmen.  Orders read back correctly.     David Aguilar RN    Subjective/Objective:      Lana Lantigua, a 89 y.o. female is on warfarin. Facility nurse reports for Lana:    Other anticoagulants: No    Medication changes: No     Missed warfarin doses since last INR: No     Abnormal bleeding since last INR: No    New symptoms, injury or illness: No     Upcoming surgery, procedure or cardioversion: No    Recent INR Results:    Lab Results   Component Value Date    INR 3.00 (!) 01/22/2020    INR 4.50 (!) 01/21/2020    INR 4.00 (!) 01/20/2020       Anticoagulation Episode Summary     Current INR goal:   2.0-3.0   TTR:   --   Next INR check:   1/24/2020   INR from last check:   3.00 (1/22/2020)   Weekly max warfarin dose:      Target end date:      INR check location:      Preferred lab:      Send INR reminders to:   [facility] (TCU/LTC/GENESIS)    Indications    Atrial fibrillation with rapid ventricular response (H) [I48.91]  Acute deep vein thrombosis (DVT) of left femoral vein (H) [I82.412]           Comments:            Anticoagulation Care Providers     Provider Role Specialty Phone number    Edgardo Lund DO [facility] [phone]

## 2021-06-05 NOTE — TELEPHONE ENCOUNTER
ANTICOAGULATION  MANAGEMENT    Assessment     Today's INR result of 4.6 is Supratherapeutic (goal INR of 2.0-3.0)        Previous INR was Supratherapeutic    Warfarin recently held for supratherapeutic INR which may be affecting INR    Acute health changes, DVT, may be affecting INR    Interaction between elliquis and warfarin may be affecting INR - D/C'ed today    Continues to tolerate warfarin with no reported s/s of bleeding or thromboembolism       Plan:     Warfarin Dosing Orders:   Hold warfarin today    Next INR: Wed 1/15    Telephone orders given to nurseCamila.  Orders read back correctly.     Carmen Crespo RN    Subjective/Objective:      Lana Lantigua, a 89 y.o. female is on warfarin recently admitted to TCU under care of [facility].  Chart reviewed:    Outpatient anticoagulation information:     Anticoagulation management provider: new to warfarin, was on elliquis    Reason for anticoagulation: DVT    Home INR goal:     Home warfarin dose:      Recent hospitalization review:    Reason for hospitalization prior to TCU admission: DVT    Hospital warfarin management: warfarin initiated    Hospital medication changes pertinent to anticoagulation: No    Health changes pertinent to anticoagulation during hospitalization: No      TCU Facility nurse report since admission:    Other anticoagulants: Yes: ASA 81 mg    Medication changes: Yes: elliquis D/C'ed    Missed warfarin doses since last INR: No     Abnormal bleeding since last INR: No    New symptoms, injury or illness: No     Upcoming surgery, procedure or cardioversion: No      Recent INR Results:    Lab Results   Component Value Date    INR 4.60 (!) 01/14/2020    INR 2.00 (H) 01/10/2020    INR 2.43 (H) 01/09/2020       Anticoagulation Episode Summary     Current INR goal:   2.0-3.0   TTR:   --   Next INR check:   1/15/2020   INR from last check:   4.60! (1/14/2020)   Weekly max warfarin dose:      Target end date:      INR  check location:      Preferred lab:      Send INR reminders to:   [facility] (TCU/LTC/GENESIS)    Indications    Atrial fibrillation with rapid ventricular response (H) [I48.91]  Acute deep vein thrombosis (DVT) of left femoral vein (H) [I82.412]           Comments:            Anticoagulation Care Providers     Provider Role Specialty Phone number    Edgardo Lund DO [facility] [phone]

## 2021-06-05 NOTE — TELEPHONE ENCOUNTER
ANTICOAGULATION  MANAGEMENT    Assessment     Today's INR result of 4.0 is Supratherapeutic (goal INR of 2.0-3.0)        Previous INR was Therapeutic    Warfarin given as previously instructed    No new health/diet changes affecting INR    No new medication/supplements affecting INR    Continues to tolerate warfarin with no reported s/s of bleeding or thromboembolism       Plan:     Warfarin Dosing Orders:  Hold today.    Next INR: Tomorrow 1/21.     Telephone orders given to nurseRylie.  Orders read back correctly.     David Aguilar RN    Subjective/Objective:      Lana Lantigua, a 89 y.o. female is on warfarin. Facility nurse reports for Lana:    Other anticoagulants: No    Medication changes: No     Missed warfarin doses since last INR: No     Abnormal bleeding since last INR: No    New symptoms, injury or illness: No     Upcoming surgery, procedure or cardioversion: No    Recent INR Results:    Lab Results   Component Value Date    INR 4.00 (!) 01/20/2020    INR 2.50 (!) 01/17/2020    INR 3.00 (!) 01/16/2020       Anticoagulation Episode Summary     Current INR goal:   2.0-3.0   TTR:   --   Next INR check:   1/21/2020   INR from last check:   4.00! (1/20/2020)   Weekly max warfarin dose:      Target end date:      INR check location:      Preferred lab:      Send INR reminders to:   [facility] (TCU/LTC/longterm)    Indications    Atrial fibrillation with rapid ventricular response (H) [I48.91]  Acute deep vein thrombosis (DVT) of left femoral vein (H) [I82.412]           Comments:            Anticoagulation Care Providers     Provider Role Specialty Phone number    Edgardo Lund DO [facility] [phone]

## 2021-06-05 NOTE — TELEPHONE ENCOUNTER
ANTICOAGULATION  MANAGEMENT    Assessment     Today's INR result of 2.5 is Therapeutic (goal INR of 2.0-3.0)        Previous INR was Therapeutic    Warfarin given as previously instructed    No new health/diet changes affecting INR    No new medication/supplements affecting INR    Continues to tolerate warfarin with no reported s/s of bleeding or thromboembolism       Plan:     Warfarin Dosing Orders:  Give 2 mg Fri and Sun; 1 mg on Sat.    Next INR: Mon 1/20.    Telephone orders given to nurseCamila.  Orders read back correctly.     David Aguilar RN    Subjective/Objective:      Lana Lantigua, a 89 y.o. female is on warfarin. Facility nurse reports for Lana:    Other anticoagulants: No    Medication changes: No     Missed warfarin doses since last INR: No     Abnormal bleeding since last INR: No    New symptoms, injury or illness: No     Upcoming surgery, procedure or cardioversion: No    Recent INR Results:    Lab Results   Component Value Date    INR 2.50 (!) 01/17/2020    INR 3.00 (!) 01/16/2020    INR 3.70 (!) 01/15/2020       Anticoagulation Episode Summary     Current INR goal:   2.0-3.0   TTR:   --   Next INR check:   1/20/2020   INR from last check:   2.50 (1/17/2020)   Weekly max warfarin dose:      Target end date:      INR check location:      Preferred lab:      Send INR reminders to:   [facility] (TCU/LTC/long-term)    Indications    Atrial fibrillation with rapid ventricular response (H) [I48.91]  Acute deep vein thrombosis (DVT) of left femoral vein (H) [I82.412]           Comments:            Anticoagulation Care Providers     Provider Role Specialty Phone number    Edgardo Lund DO [facility] [phone]

## 2021-06-05 NOTE — PROGRESS NOTES
[facility]    Facility:   [facility] [[id number]]   Code Status: DNR      CHIEF COMPLAINT/REASON FOR VISIT:  Chief Complaint   Patient presents with     Follow Up     rehab,        HISTORY:      HPI: Lana is a 89 y.o. female who I had the opportunity to revisit with secondary to her hospitalization most recently January 24through January 29, 2020 secondary to dyspnea and hypoxia.  She was diagnosed with acute on chronic diastolic congestive heart failure with a BNP of 1202 which was up from 692 from December 22, 2019.  The CAT scan did show bilateral pleural effusions of lower lobes.  The lower extremity edema did worsen.  She was hypoxic with 88% on room air.  Symptoms also seem to worsen after she was given fluids per sepsis protocol.  She was diuresed with furosemide.  Regarding her atrial fibrillation she did have a diltiazem bolus and continue with warfarin.  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  They can continue with the atorvastatin and aspirin.  She does have a history of dysphasia and I did request speech therapy to evaluate.  Has a history of recent left leg DVT.  The goals of care pretty much comfort.  With a history of hyponatremia the sodium level remained at 147.  Prior to that she was hospitalizedJanuary 6, 2020 through January 10, 2020 secondary to left lower extremity swelling and erythema.  The ultrasound left lower extremity on January 6 did show a DVT from the popliteal to the femoral vein.  She has been on Eliquis for atrial fibrillation switch to heparin in the setting of a new DVT was bridged to warfarin.  Regarding her atrial fibrillation.  Metoprolol was increased 75 mg twice daily rather than 50 mg twice daily.  She also did receive furosemide 40 mg x 2.  She was evaluated by dietitian secondary to calorie malnutrition.  Her sodium was elevated at 147 did resolve at discharge.  Recently a history of CVA left MCA  with thrombectomy and TPA treatment.  She also does have normocytic anemia likely of chronic disease.  She is now back in the transitional care unit we did have a chance to talk about that unfortunately due to cognition it was difficult to see if she really understood what we were talking about.  I had a chance to at least go over her medications.  The metformin was held in the hospital.  Her Accu-Cheks have been running 111-1 71.  Her legs actually pretty good today she does have a BMP and hemoglobin scheduled for today the results not yet back by this dictation.  Also speech therapy will be working with her.  She otherwise has been normotensive and afebrile and also on room air.  Past Medical History:   Diagnosis Date     B12 deficiency      Dementia (H) 12/11/2018     DNI (do not intubate) 10/17/2017     DNR (do not resuscitate) 10/17/2017     Gastric carcinoma (H)  1994     Nonsmoker      Osteoporosis      Venous insufficiency 8/14/2017     White coat hypertension              Family History   Problem Relation Age of Onset     Heart failure Father      Lung cancer Sister      Hypertension Mother      Varicose Veins Mother      Cancer Son          choriocarcinoma     Social History     Socioeconomic History     Marital status:      Spouse name: Not on file     Number of children: 7     Years of education: Not on file     Highest education level: Not on file   Occupational History     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: RETIRED   Social Needs     Financial resource strain: Not on file     Food insecurity:     Worry: Not on file     Inability: Not on file     Transportation needs:     Medical: Not on file     Non-medical: Not on file   Tobacco Use     Smoking status: Never Smoker     Smokeless tobacco: Never Used   Substance and Sexual Activity     Alcohol use: No     Drug use: No     Sexual activity: Not on file   Lifestyle     Physical activity:     Days per week: Not on file     Minutes per  session: Not on file     Stress: Not on file   Relationships     Social connections:     Talks on phone: Not on file     Gets together: Not on file     Attends Yarsanism service: Not on file     Active member of club or organization: Not on file     Attends meetings of clubs or organizations: Not on file     Relationship status: Not on file     Intimate partner violence:     Fear of current or ex partner: Not on file     Emotionally abused: Not on file     Physically abused: Not on file     Forced sexual activity: Not on file   Other Topics Concern     Not on file   Social History Narrative    Has resided in [place] in the past.  Generally has lived in [place], [place].  [place] in [place] at this time.        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        1/24/20: Lana resides at [facility] in [place].         Review of Systems  Currently no reports of fever chills fatigue cough or cold flulike symptoms nausea vomiting diarrhea dysuria flulike symptoms headache or stiff neck.  History of A. fib left MCA CVA gastric cancer heart failure, dementia, recent left leg DVT.     Current Outpatient Medications   Medication Sig     acetaminophen (TYLENOL) 500 MG tablet Take 1 tablet (500 mg total) by mouth every 6 (six) hours as needed for pain or fever.     albuterol (PROVENTIL) 2.5 mg /3 mL (0.083 %) nebulizer solution Take 2.5 mg by nebulization 4 (four) times a day as needed for wheezing.     aspirin 81 mg chewable tablet Chew 1 tablet (81 mg total) daily.     atorvastatin (LIPITOR) 40 MG tablet Take 1 tablet (40 mg total) by mouth at bedtime.     bisacodyl (DULCOLAX) 10 mg suppository Insert suppository rectally daily as needed for treatment of constipation.     cyanocobalamin 1,000 mcg/mL injection Inject 1,000 mcg into the shoulder, thigh, or buttocks every 30 (thirty) days.     furosemide (LASIX) 20 MG tablet Take 1 tablet (20 mg total) by mouth daily.     melatonin 3 mg  Tab tablet Take 3 mg by mouth at bedtime.     metFORMIN (FORTAMET) 500 MG (OSM) 24 hr tablet Take 1 tablet (500 mg total) by mouth 2 (two) times a day with meals.     metoprolol tartrate (LOPRESSOR) 50 MG tablet Take 50 mg by mouth 2 (two) times a day.     polyethylene glycol (MIRALAX) 17 gram packet Take 1 packet (17 g total) by mouth daily as needed.     senna-docusate (PERICOLACE) 8.6-50 mg tablet Take 1 tablet by mouth 2 (two) times a day as needed for constipation. (Patient taking differently: Take 1 tablet by mouth 2 (two) times a day. )     traZODone (DESYREL) 50 MG tablet Take 25 mg by mouth at bedtime.     triamcinolone (KENALOG) 0.1 % cream Apply topically 2 (two) times a day. (Patient taking differently: Apply topically 2 (two) times a day. Apply to left lower extremity for dermatitis.)     warfarin ANTICOAGULANT (COUMADIN/JANTOVEN) 1 MG tablet Take 0.5 tablets (0.5 mg total) by mouth daily. Adjust dose based on INR results.       There were no vitals filed for this visit.  Blood pressure 129/75 pulse 90 respirations 18 temperature 98.1 saturation room air 96%  Physical Exam   Head is normocephalic.  Neck is supple without adenopathy lungs are clear throughout.  Cardiovascular does have A. fib along with 3/6 MAGDIEL.  Lower extremity edema.  Left greater than right.  Left leg with a DVT.  Musculoskeletal generalized weakness.   Denies pain.  Psychiatric Pleasant but does have cognitive impairment.   LABS:   Lab Results   Component Value Date    WBC 9.5 01/28/2020    HGB 9.0 (L) 01/28/2020    HCT 30.5 (L) 01/28/2020    MCV 85 01/28/2020     01/28/2020     Results for orders placed or performed during the hospital encounter of 01/24/20   Basic Metabolic Panel   Result Value Ref Range    Sodium 140 136 - 145 mmol/L    Potassium 3.7 3.5 - 5.0 mmol/L    Chloride 103 98 - 107 mmol/L    CO2 30 22 - 31 mmol/L    Anion Gap, Calculation 7 5 - 18 mmol/L    Glucose 107 70 - 125 mg/dL    Calcium 8.0 (L) 8.5 - 10.5  mg/dL    BUN 22 8 - 28 mg/dL    Creatinine 0.73 0.60 - 1.10 mg/dL    GFR MDRD Af Amer >60 >60 mL/min/1.73m2    GFR MDRD Non Af Amer >60 >60 mL/min/1.73m2       Lab Results   Component Value Date    ALT 66 (H) 01/24/2020    AST 60 (H) 01/24/2020    ALKPHOS 124 (H) 01/24/2020    BILITOT 0.9 01/24/2020         ASSESSMENT:      ICD-10-CM    1. Chronic systolic congestive heart failure (H) I50.22    2. History of CVA (cerebrovascular accident) Z86.73    3. Dementia without behavioral disturbance, unspecified dementia type (H) F03.90    4. Severe protein-calorie malnutrition (H) E43        PLAN:    Changing up the Accu-Cheks to twice a day rather than 3 times a day she is not even taking medication yet hope is perhaps we can keep her off the Metformin.  Appetite is only fair at best.  Does need assistance with all ADLs.  She does not appear to be uncomfortable.  Also did have the pleasure of talking with her about her pictures in the room and she was a much younger female in her  outfit as well as her nursing office.  Staff will keep me updated for any other issues.    Electronically signed by: Michael Duane Johnson, CNP

## 2021-06-05 NOTE — PROGRESS NOTES
Code Status:  DNR  Visit Type: Problem Visit (Lower extremity edema with redness and significant bruising)     Facility:  [facility] [[id number]]      Facility Type: SNF (Skilled Nursing Facility, TCU)    History of Present Illness:   Hospital Admission Date: 12/22/2019 Hospital Discharge Date: 12/27/2019      Lana Lantigua is a 89 y.o. female who has a past medical history for gastric cancer, hypertension, osteoporosis, vitamin B12 deficiency, Alzheimer's type dementia, and malnutrition.  She was recently hospitalized for right-sided weakness and aphasia and was found to have an acute left MCA ischemic stroke.  In the emergency room she did receive TPA and thrombectomy.  She was started on aspirin and atorvastatin and is to follow-up with neurology.  She does have right-sided hemiparesis with aphasia and was found to have dysphasia however her diet has been advanced and is currently on puréed diet.  She also developed atrial fibrillation with RVR and was started on metoprolol and apixaban.  She was found to have moderate aortic regurgitation and is to follow-up with cardiology.  Her A1c was 6.4% and she was started on metformin.    Today, nursing request that I see her for a problem visit due to significant lower extremity edema and blistering of her skin over the weekend.  Nursing reports that this has been getting worse progressively over the past 3 days.  Upon exam she has significant redness from her right ankle up to her knee which appears to be cellulitic.  She denies any calf tenderness however she does have a significant ecchymosis/erythema of her right posterior knee area.  She also has significant ecchymosis of her left shin.  There is some blistering and puckering of the skin related to fluid.  She is on apixaban.    Past Medical History:   Diagnosis Date     B12 deficiency      Dementia (H) 12/11/2018     DNI (do not intubate) 10/17/2017     DNR (do not resuscitate) 10/17/2017     Gastric  carcinoma (H)  1994     Nonsmoker      Osteoporosis      Venous insufficiency 8/14/2017     White coat hypertension        No current facility-administered medications for this visit.      Current Outpatient Medications   Medication Sig Dispense Refill     acetaminophen (TYLENOL) 500 MG tablet Take 1 tablet (500 mg total) by mouth every 6 (six) hours as needed for pain or fever.  0     apixaban (ELIQUIS) 2.5 mg Tab tablet Take 1 tablet (2.5 mg total) by mouth 2 (two) times a day. 60 tablet 0     aspirin 81 mg chewable tablet Chew 1 tablet (81 mg total) daily.  0     atorvastatin (LIPITOR) 40 MG tablet Take 1 tablet (40 mg total) by mouth at bedtime.  0     bisacodyl (DULCOLAX) 10 mg suppository Insert suppository rectally daily as needed for treatment of constipation.  0     melatonin 3 mg Tab tablet Take 3 mg by mouth at bedtime.       metFORMIN (FORTAMET) 500 MG (OSM) 24 hr tablet Take 1 tablet (500 mg total) by mouth daily with breakfast.       metoprolol tartrate (LOPRESSOR) 50 MG tablet Take 1 tablet (50 mg total) by mouth 2 (two) times a day.  0     polyethylene glycol (MIRALAX) 17 gram packet Take 1 packet (17 g total) by mouth daily as needed.  0     senna-docusate (PERICOLACE) 8.6-50 mg tablet Take 1 tablet by mouth 2 (two) times a day as needed for constipation. (Patient taking differently: Take 1 tablet by mouth 2 (two) times a day. )  0     traZODone (DESYREL) 50 MG tablet Take 25 mg by mouth at bedtime.       triamcinolone (KENALOG) 0.1 % cream Apply topically 2 (two) times a day. (Patient taking differently: Apply topically 2 (two) times a day. Apply to left lower extremity for dermatitis.) 454 g 2     Facility-Administered Medications Ordered in Other Visits   Medication Dose Route Frequency Provider Last Rate Last Dose     heparin 25,000 units in 0.45% sodium chloride (100 units/ml) 250 mL infusion  1-50 Units/kg/hr Intravenous Continuous Glen Khoury, DO         No Known  Allergies  Immunization History   Administered Date(s) Administered     Influenza high dose,seasonal,PF, 65+ yrs 10/17/2017, 10/04/2019     Influenza, inj, historic,unspecified 09/10/2009, 12/08/2013, 10/08/2018     Pneumo Polysac 23-V 12/01/2004       Review of Systems   To call to obtain due to patient's dementia.  She does deny any current pain or discomforts.  Nursing reports she is having bowel movements and urinating well.      Physical Exam   Vital signs: /65, heart rate 100, respiratory 16, temp 97.9.  GENERAL APPEARANCE: Thin, elderly female in no acute distress.  HEENT: normocephalic, atraumatic  PERRL, sclerae anicteric, conjunctivae clear and moist, EOM intact  LUNGS: Lung sounds CTA, no adventitious sounds, respiratory effort normal.  CARD: RRR, S1, S2, without murmurs, gallops, rubs,   ABD: Soft and nontender with normal bowel sounds.   MSK: Right-sided weakness lower extremity  EXTREMITIES: Significant edema with puckering and blistering of her skin to her lower extremities bilaterally.  Right lower extremity has significant redness and heat from her ankle to her knee.  She has ecchymosis/erythema to the posterior part of her right knee.  She has significant ecchymosis which is new to her left shin.  NEURO: Alert with obvious cognitive impairment.  Face is symmetric.  SKIN: See extremity  PSYCH: euthymic            Labs:    Recent Results (from the past 240 hour(s))   HM2(CBC w/o Differential)   Result Value Ref Range    WBC 11.1 (H) 4.0 - 11.0 thou/uL    RBC 3.38 (L) 3.80 - 5.40 mill/uL    Hemoglobin 9.3 (L) 12.0 - 16.0 g/dL    Hematocrit 30.7 (L) 35.0 - 47.0 %    MCV 91 80 - 100 fL    MCH 27.5 27.0 - 34.0 pg    MCHC 30.3 (L) 32.0 - 36.0 g/dL    RDW 14.6 (H) 11.0 - 14.5 %    Platelets 373 140 - 440 thou/uL    MPV 9.8 8.5 - 12.5 fL   Basic Metabolic Panel   Result Value Ref Range    Sodium 147 (H) 136 - 145 mmol/L    Potassium 4.1 3.5 - 5.0 mmol/L    Chloride 116 (H) 98 - 107 mmol/L    CO2 25 22  - 31 mmol/L    Anion Gap, Calculation 6 5 - 18 mmol/L    Glucose 133 (H) 70 - 125 mg/dL    Calcium 8.3 (L) 8.5 - 10.5 mg/dL    BUN 17 8 - 28 mg/dL    Creatinine 0.71 0.60 - 1.10 mg/dL    GFR MDRD Af Amer >60 >60 mL/min/1.73m2    GFR MDRD Non Af Amer >60 >60 mL/min/1.73m2         Assessment:  1. Localized edema     2. Cellulitis of right lower extremity     3. Abnormal bruising     4. Acute cerebrovascular accident (H)     5. Anticoagulated         Plan:    At this time her acute status cannot be managed in the facility as we need immediate diagnostic studies to be completed and she likely may need IV antibiotics for her cellulitis.  We will send her to the ER and recommend Doppler studies to her lower extremities and consideration of her apixaban.      Electronically signed by: Aura Buck CNP

## 2021-06-05 NOTE — PROGRESS NOTES
[facility]      Facility:    [facility] [[id number]]  Code Status: DNR      Chief Complaint/Reason for Visit:  Chief Complaint   Patient presents with     H & P     Left more femoral vein deep vein thrombosis, prediabetes, hypernatremia, history of CVA with hemiparesis, severe protein calorie malnutrition, congestive systolic congestive heart failure.       HPI:   Lana is a 89 y.o. female who has had a previous hospitalization last month for CVA with hemiparesis.  She was then admitted to the hospital on 1/6/2020 likely from another facility for left lower leg swelling and erythema.  Ultrasound done 1/6/2020 showed DVT with a popliteal to femoral vein.  She had been on Eliquis for atrial fibrillation.  She is brought to the hospital and was switched to heparin in the setting of new DVT.  Patient was bridged to Coumadin with heparin and INR was greater than 2 5 2 days.  Heparin was stopped.  She did have atrial fibrillation rapid ventricular response in the hospital the increase in metoprolol 50 mg p.o. twice daily up to 75 mg twice daily.  This did improve.    Patient does have a CVA to left middle cerebral artery with thrombectomy and TPA treatment contingent continues to have hemiparesis.  She did continue aspirin secondary to this and she did have normocytic anemia and no blood transfusions are recorded.  She was treated appropriately and transferred here to the TCU at [facility] in stable condition.    Reviewing patient's medication she is currently on Eliquis as well as Coumadin clearly in the discharge summary it did say to stop the Eliquis.  This will be stopped today.    Patient is a phasic at this time which is consistent from her previous stroke.  She can move all 4 extremities at this time and does follow commands.  Her review of systems is basically negative at this time.    Patient is a phasic at this time and can answer questions appropriately.   She does move all 4 extremities however she is a phasic.    Past Medical History:  Past Medical History:   Diagnosis Date     B12 deficiency      Dementia (H) 12/11/2018     DNI (do not intubate) 10/17/2017     DNR (do not resuscitate) 10/17/2017     Gastric carcinoma (H)  1994     Nonsmoker      Osteoporosis      Venous insufficiency 8/14/2017     White coat hypertension            Surgical History:  Past Surgical History:   Procedure Laterality Date     APPENDECTOMY  1960     CATARACT EXTRACTION Bilateral      CHOLECYSTECTOMY  1960     HYSTERECTOMY  1960     IR CEREBRAL ANGIOGRAM  12/22/2019     TONSILLECTOMY  age 20      TOTAL GASTRECTOMY  12/01/1994       Family History:   Family History   Problem Relation Age of Onset     Heart failure Father      Lung cancer Sister      Hypertension Mother      Varicose Veins Mother      Cancer Son          choriocarcinoma       Social History:    Social History     Socioeconomic History     Marital status:      Spouse name: None     Number of children: 7     Years of education: None     Highest education level: None   Occupational History     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: RETIRED   Social Needs     Financial resource strain: None     Food insecurity:     Worry: None     Inability: None     Transportation needs:     Medical: None     Non-medical: None   Tobacco Use     Smoking status: Never Smoker     Smokeless tobacco: Never Used   Substance and Sexual Activity     Alcohol use: No     Drug use: No     Sexual activity: None   Lifestyle     Physical activity:     Days per week: None     Minutes per session: None     Stress: None   Relationships     Social connections:     Talks on phone: None     Gets together: None     Attends Religion service: None     Active member of club or organization: None     Attends meetings of clubs or organizations: None     Relationship status: None     Intimate partner violence:     Fear of current or ex partner: None      Emotionally abused: None     Physically abused: None     Forced sexual activity: None   Other Topics Concern     None   Social History Narrative    Has resided in [place] in the past.  Generally has lived in [place], [place].  [place] in [place] at this time.        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          Review of Systems   Constitutional:        Patient denies any pain fevers chills nausea vomit diarrhea change in vision hearing taste or smell weakness one side of the chest pain or shortness of breath.  She denies any congeners stool polyphagia polydipsia polyuria depression or anxiety in the main review of systems is negative.       Vitals:    01/13/20 1039   BP: 102/72   Pulse: (!) 105   Resp: 18   Temp: 97.2  F (36.2  C)   SpO2: 97%       Physical Exam  Constitutional:       General: She is not in acute distress.     Appearance: She is not toxic-appearing.   HENT:      Head: Normocephalic and atraumatic.      Nose: Nose normal. No congestion or rhinorrhea.   Eyes:      General:         Right eye: No discharge.         Left eye: No discharge.   Cardiovascular:      Comments: Patient's heart sounds are irregularly irregular with an apical pulse at this time done by me at 96.  Pulmonary:      Effort: Pulmonary effort is normal. No respiratory distress.      Breath sounds: Normal breath sounds. No rales.   Abdominal:      General: Abdomen is flat. Bowel sounds are normal.   Musculoskeletal:      Right lower leg: Edema present.      Left lower leg: Edema present.   Skin:     General: Skin is warm and dry.   Neurological:      Mental Status: She is alert. Mental status is at baseline.         Medication List:  Current Outpatient Medications   Medication Sig     acetaminophen (TYLENOL) 500 MG tablet Take 1 tablet (500 mg total) by mouth every 6 (six) hours as needed for pain or fever.     aspirin 81 mg chewable tablet Chew 1 tablet (81 mg total) daily.     atorvastatin (LIPITOR) 40 MG  tablet Take 1 tablet (40 mg total) by mouth at bedtime.     bisacodyl (DULCOLAX) 10 mg suppository Insert suppository rectally daily as needed for treatment of constipation.     melatonin 3 mg Tab tablet Take 3 mg by mouth at bedtime.     metFORMIN (FORTAMET) 500 MG (OSM) 24 hr tablet Take 1 tablet (500 mg total) by mouth daily with breakfast.     metoprolol tartrate 75 mg Tab Take 75 mg by mouth 2 (two) times a day.     polyethylene glycol (MIRALAX) 17 gram packet Take 1 packet (17 g total) by mouth daily as needed.     senna-docusate (PERICOLACE) 8.6-50 mg tablet Take 1 tablet by mouth 2 (two) times a day as needed for constipation. (Patient taking differently: Take 1 tablet by mouth 2 (two) times a day. )     traZODone (DESYREL) 50 MG tablet Take 25 mg by mouth at bedtime.     triamcinolone (KENALOG) 0.1 % cream Apply topically 2 (two) times a day. (Patient taking differently: Apply topically 2 (two) times a day. Apply to left lower extremity for dermatitis.)     warfarin ANTICOAGULANT (COUMADIN/JANTOVEN) 2 MG tablet Take 1 tablet (2 mg total) by mouth daily.       Labs: Hospital labs are as follows; on 1/10/2020 INR was 2.0, on 1/9/2020 it was 2.43, PTT was 89.  Platelets were 300, hemoglobin was 8.5, and we do not have a listed basic metabolic profile.  Only 1 blood sugar reading here at the facility indicates in the 200s.      Assessment:    ICD-10-CM    1. Acute deep vein thrombosis of lower leg, left (H) I82.4Z2    2. Atrial fibrillation with rapid ventricular response (H) I48.91    3. Severe protein-calorie malnutrition (H) E43    4. Type 2 diabetes mellitus with hyperglycemia, without long-term current use of insulin (H) E11.65    5. Chronic systolic (congestive) heart failure (H) I50.22    6. Acute cerebrovascular accident (H) I63.9    7. Right hemiplegia (H) G81.91    8. DNI (do not intubate) Z78.9        Plan: At this time will check blood sugars 4 times daily at this time and Coumadin clinic will  manage INR.  We will stop the Eliquis at this time that somehow came in on the orders however she is on Coumadin and aspirin.  And in the discharge summary it was 1 of the medications to stop.  That did not come over on the orders.  Speech therapy will evaluate and treat and weights will be done a daily basic basis.  Basic metabolic profile be done Wednesday secondary to systolic congestive heart failure and to make a logical decision to start Lasix if patient starts gaining weight at this time.  She can move all 4 extremities at this time but reading from the chart it seems like she is about baseline.        Electronically signed by: Edgardo Lund DO

## 2021-06-05 NOTE — PROGRESS NOTES
Code Status:  DNR  Visit Type: Problem Visit (aspiration, hypoxia, Vit B 12 deficiency)     Facility:  [facility] [[id number]]        Facility Type: SNF (Skilled Nursing Facility, TCU)    History of Present Illness: Lana Lantigua is a 89 y.o. 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  She was treated with thrombolytics and started on xarelto.  She was transferred to a different TCU and later developed a LE DVT and was rehospitalized.  She was taken off the xarelto and placed on warfarin. She does have severe vascular dementia and is dependent in ADLs.     Today, nursing requested that I see her for a problem visit after an episode of hypoxia during therapy in which her O2 sats were in the 80s.  Nursing reports crackles auscultated in her lungs.  Upon exam she is calmly sitting in her room eating eggs.  While eating she does appear to pocket and continuously chew.  She does have some coughing during eating.  She is also on thin liquids.  Her upper lobes and her right LL is diminished, however it is difficult to auscultate as her dementia makes it difficult to direct her in deep inspiration.  Her O2 sats are no 95% on RA.  She continues to have pitting LE edema R>L. HR in the 90s and irregular    Review of Systems   ROS difficult to obtain due to patient's dementia.  Nursing denies issues with Bowels or bladder.         Physical Exam   Vital signs: /91, resp 16, HR 90s, 97.1  GENERAL APPEARANCE: Thin, elderly female in no acute distress.  HEENT: normocephalic, atraumatic  PERRL, sclerae anicteric, conjunctivae clear and moist, EOM intact  LUNGS: upper lungs clear, diminished right base, no adventitious sounds, respiratory effort normal.  CARD:irregularly irregular,  S1, S2, without murmurs, gallops, rubs,  ABD: Soft and nontender with normal bowel sounds.   EXTREMITIES: +1 pitting edema BLE R>L  NEURO:  Alert and cognitive impairment  SKIN: Inspection of the skin reveals no rashes, ulcerations or petechiae.  PSYCH: euthymic          Labs:    Recent Results (from the past 240 hour(s))   INR   Result Value Ref Range    INR 3.70 (!) 0.9 - 1.1   Basic Metabolic Panel   Result Value Ref Range    Sodium 142 136 - 145 mmol/L    Potassium 3.7 3.5 - 5.0 mmol/L    Chloride 106 98 - 107 mmol/L    CO2 28 22 - 31 mmol/L    Anion Gap, Calculation 8 5 - 18 mmol/L    Glucose 104 70 - 125 mg/dL    Calcium 8.5 8.5 - 10.5 mg/dL    BUN 16 8 - 28 mg/dL    Creatinine 0.72 0.60 - 1.10 mg/dL    GFR MDRD Af Amer >60 >60 mL/min/1.73m2    GFR MDRD Non Af Amer >60 >60 mL/min/1.73m2   INR   Result Value Ref Range    INR 3.00 (!) 0.9 - 1.1   INR   Result Value Ref Range    INR 2.50 (!) 0.9 - 1.1   INR   Result Value Ref Range    INR 4.00 (!) 0.9 - 1.1   INR   Result Value Ref Range    INR 4.50 (!) 0.9 - 1.1   INR   Result Value Ref Range    INR 3.00 (!) 0.9 - 1.1   INR   Result Value Ref Range    INR 2.40 (!) 0.9 - 1.1         Assessment:  1. Hypoxia     2. Coughing     3. Decreased breath sounds at right lung base     4. B12 deficiency     5. Acute deep vein thrombosis of lower leg, left (H)         Plan:   Will obtain chest xray to rule out aspiration.  Discussed with family the differential of PE vs aspiration.  If chest xray is clear will continue to monitor.  Explained to family that she is already on treatment so if not hypoxic would not do further diagnostics for PE workup.  Discussed her dysphagia and possible pocketing and family also noticing her coughing during eating.  Left msg for ST to continue to evaluate.  Albuterol nebs prn for an wheezing or shortness of breath. Continue on Coumadin for DVT/CVA.  Coumadin clinic dosing.      I did do thorough chart review and found her last Vit B 12 injection was the beginning of December.  Instructed nursing to give today.  Recheck Vit B 12 level in 2 weeks and continue injections every 30  days.      I would suspect that patient would need memory care setting at discharge as she is quite dependent in transfers and ADLs.       35 total minutes spent with 20 minutes spent face to face with patient, family and nursing regarding current status and plan for recent hypoxic episode.      Electronically signed by: Aura Buck CNP

## 2021-06-05 NOTE — TELEPHONE ENCOUNTER
ANTICOAGULATION  MANAGEMENT    Assessment     Today's INR result of 1.7 is Subtherapeutic (goal INR of 2.0-3.0)        Previous INR was Subtherapeutic    Warfarin given as previously instructed    No new health/diet changes affecting INR    Potential interaction between Tamiflu and warfarin which may affect subsequent INRs- starting today x14 days.    Continues to tolerate warfarin with no reported s/s of bleeding or thromboembolism       Plan:     Warfarin Dosing Orders:  Give 2 mg today; 1 mg on Tues.    Next INR: Wed 2/5.    Telephone orders given to nurseAngela.  Orders read back correctly.     David Aguilar RN    Subjective/Objective:      Lana Lantigua, a 89 y.o. female is on warfarin. Facility nurse reports for Lana:    Other anticoagulants: No    Medication changes: Yes, starting Tamiflu x14 days.      Missed warfarin doses since last INR: No     Abnormal bleeding since last INR: No    New symptoms, injury or illness: No     Upcoming surgery, procedure or cardioversion: No    Recent INR Results:    Lab Results   Component Value Date    INR 1.70 (!) 02/03/2020    INR 1.90 (!) 01/31/2020    INR 3.04 (H) 01/29/2020       Anticoagulation Episode Summary     Current INR goal:   2.0-3.0   TTR:   0.0 % (3 d)   Next INR check:   2/5/2020   INR from last check:   1.70! (2/3/2020)   Weekly max warfarin dose:      Target end date:      INR check location:      Preferred lab:      Send INR reminders to:   [facility] (TCU/LTC/[facility])    Indications    Atrial fibrillation with rapid ventricular response (H) [I48.91]  Acute deep vein thrombosis (DVT) of left femoral vein (H) [I82.412]           Comments:            Anticoagulation Care Providers     Provider Role Specialty Phone number    Edgardo Lund DO Referring Family Medicine [phone]

## 2021-06-05 NOTE — TELEPHONE ENCOUNTER
ANTICOAGULATION  MANAGEMENT    Assessment     Today's INR result of 3.7 is Supratherapeutic (goal INR of 2.0-3.0)        Previous INR was Supratherapeutic    Warfarin recently held for supratherapeutic INR which may be affecting INR    No new health/diet changes affecting INR    No new medication/supplements affecting INR    Continues to tolerate warfarin with no reported s/s of bleeding or thromboembolism       Plan:     Warfarin Dosing Orders: Hold today.    Next INR: tmr 1/16.    Telephone orders given to nurseMaia.  Orders read back correctly.     David Aguilar RN    Subjective/Objective:      Lana Lantigua, a 89 y.o. female is on warfarin. Facility nurse reports for Lana:    Other anticoagulants: No    Medication changes: No     Missed warfarin doses since last INR: No     Abnormal bleeding since last INR: No    New symptoms, injury or illness: No     Upcoming surgery, procedure or cardioversion: No    Recent INR Results:    Lab Results   Component Value Date    INR 3.70 (!) 01/15/2020    INR 4.60 (!) 01/14/2020    INR 2.00 (H) 01/10/2020       Anticoagulation Episode Summary     Current INR goal:   2.0-3.0   TTR:   --   Next INR check:   1/16/2020   INR from last check:   3.70! (1/15/2020)   Weekly max warfarin dose:      Target end date:      INR check location:      Preferred lab:      Send INR reminders to:   [facility] (TCU/[facility])    Indications    Atrial fibrillation with rapid ventricular response (H) [I48.91]  Acute deep vein thrombosis (DVT) of left femoral vein (H) [I82.412]           Comments:            Anticoagulation Care Providers     Provider Role Specialty Phone number    Edgardo Lund DO Referring Family Medicine [phone]

## 2021-06-05 NOTE — TELEPHONE ENCOUNTER
[facility]    1/21 INR= 4.5

## 2021-06-05 NOTE — TELEPHONE ENCOUNTER
Orders being requested: Home care orders:  skilled nursing visits one visit a week for 2 weeks 2 visits a week for one week  1 visit a week for 3 weeks    for med management and INR management    PT to evaluate on 02.11.2020 and OT to evaluate on 02.17.2020    Home health aid for assistance with showers 2 visits a week for 5 weeks.    Speech therapy to evaluate week of 02.10.2020    Reason service is needed/diagnosis:   for med management and INR management  When are orders needed by: as soon as possible  Where to send Orders: Phone:  [phone]  Okay to leave detailed message?  Yes

## 2021-06-06 NOTE — TELEPHONE ENCOUNTER
Reviewed and noted.  No further action required.    George Park MD  General Internal Medicine  [facility]  2/17/2020, 1:03 PM

## 2021-06-06 NOTE — TELEPHONE ENCOUNTER
Called and left a detailed message with okay verbal orders for Home care Skilled nursing, Physical therapy, Home health aide, Speech therapy as requested.    For home care, assisted living and nursing home needs for initiation of orders that pertain to nursing, physical therapy, occupational therapy and social work.  Established patient, seen by [facility] care provider within the last 2 years (11/29/19)

## 2021-06-06 NOTE — TELEPHONE ENCOUNTER
There should be refills on the bottle still, but I sent in refills today just in case.    George Park MD  General Internal Medicine  [facility]  3/17/2020, 9:59 AM

## 2021-06-06 NOTE — TELEPHONE ENCOUNTER
Last Office Visit  2/14/2020 George Park MD  Notes:  We reviewed her hospital stay and the records from her visit were reviewed today.  I personally reviewed the lab tests, radiology and medical tests pertinent to that stay.     Patient comes in today with her .     She has been in and out of the hospital for a number of issues.  She did have a stroke.  This is her second stroke in 2 years.  It was on the other side of the brain.     She was found to be in atrial fibrillation and started on warfarin.  She is not having any bleeding issue at this time related to this.     We reviewed her heart failure as well.  She was on in heart failure when she was in the hospital as well.  Her breathing at this time is good.     She also had issues with a DVT with one hospitalization and her medications were changed at that point.     She is doing better from a rehab standpoint.  This was reviewed with her.    Last Filled: 1/30/2020  Next OV:  3/16/2020 George Park MD    Daughter states this medication was given to her mom in the hospital, question if she should continue therapy? If so she will need a script sent to her pharmacy, otherwise she is comfortable waiting to discuss medication at apt on 03/16/2020.    Medication teed up for provider signature

## 2021-06-06 NOTE — TELEPHONE ENCOUNTER
Called and gave okay verbal orders for Home care Physical therapy as requested.    For home care, assisted living and nursing home needs for initiation of orders that pertain to nursing, physical therapy, occupational therapy and social work.  Established patient, seen by [facility] care provider within the last 2 years (11/29/19)

## 2021-06-06 NOTE — TELEPHONE ENCOUNTER
Called patients daughter and relayed the message. She stated understanding and thanked for the call

## 2021-06-06 NOTE — TELEPHONE ENCOUNTER
INR result is 1.9 finger stick  INR   Date Value Ref Range Status   02/07/2020 2.40 (!) 0.9 - 1.1 Final       Will the patient be seen, or did they already see, MD or CNP today? No    Most Recent Warfarin dose day/week  Sunday Monday Tuesday Wednesday Thursday Friday Saturday         2 mg     Sunday Monday Tuesday Wednesday Thursday Friday Saturday   2 mg             Has the patient missed any doses of Coumadin, Warfarin, Jantoven in the past 7 days? No    Has the patients medications changed since the last visit? No    Has the patient experienced any bleeding recently? No    Has the patient experienced any injuries or illness recently? Yes TCU Discharge 2/7    Has the patient experienced any 'new' shortness of breath, severe headaches, or changes in vision recently? No    Has the patient had any changes in their diet, or alcohol consumption? No    Is the patient here today to prepare for any type of upcoming surgery, procedure, or for a cardioversion procedure? No    What phone number can we reach the patient at today? [phone] Monique.

## 2021-06-06 NOTE — TELEPHONE ENCOUNTER
INR result is   INR   Date Value Ref Range Status   02/25/2020 3.00 (!) 0.9 - 1.1 Final     3/03/2020              5.0    Will the patient be seen, or did they already see, MD or CNP today? No    Most Recent Warfarin dose day/week  Sunday Monday Tuesday Wednesday Thursday Friday Saturday     2 mg  2 mg  2 mg  2 mg  2 mg      Sunday Monday Tuesday Wednesday Thursday Friday Saturday   2 mg  2 mg             Has the patient missed any doses of Coumadin, Warfarin, Jantoven in the past 7 days? No    Has the patients medications changed since the last visit? No    Has the patient experienced any bleeding recently? No but some old bruising noted    Has the patient experienced any injuries or illness recently? No    Has the patient experienced any 'new' shortness of breath, severe headaches, or changes in vision recently? No    Has the patient had any changes in their diet, or alcohol consumption? No    Is the patient here today to prepare for any type of upcoming surgery, procedure, or for a cardioversion procedure? No    What phone number can we reach the patient at today? [facility] - [phone].

## 2021-06-06 NOTE — TELEPHONE ENCOUNTER
INR result is     3.0  INR   Date Value Ref Range Status   02/18/2020 2.00 (!) 0.9 - 1.1 Final       Will the patient be seen, or did they already see, MD or CNP today? No    Most Recent Warfarin dose day/week  Sunday Monday Tuesday Wednesday Thursday Friday Saturday     2 2 2 2 2     Sunday Monday Tuesday Wednesday Thursday Friday Saturday   2 2            Has the patient missed any doses of Coumadin, Warfarin, Jantoven in the past 7 days? No    Has the patients medications changed since the last visit? No    Has the patient experienced any bleeding recently? No    Has the patient experienced any injuries or illness recently? No    Has the patient experienced any 'new' shortness of breath, severe headaches, or changes in vision recently? No    Has the patient had any changes in their diet, or alcohol consumption? No    Is the patient here today to prepare for any type of upcoming surgery, procedure, or for a cardioversion procedure? No    What phone number can we reach the patient at today? Number on file

## 2021-06-06 NOTE — TELEPHONE ENCOUNTER
RN cannot approve Refill Request    RN can NOT refill this medication med is not covered by policy/route to provider. Last office visit: 2/14/2020 George Park MD Last Physical: 6/26/2017 Last MTM visit: Visit date not found Last visit same specialty: Visit date not found.  Next visit within 3 mo: Visit date not found  Next physical within 3 mo: Visit date not found      Jackie Pacheco, [facility] Triage/Med Refill 2/22/2020    Requested Prescriptions   Pending Prescriptions Disp Refills     warfarin ANTICOAGULANT (COUMADIN/JANTOVEN) 2 MG tablet [Pharmacy Med Name: WARFARIN SOD 2MG TABLETS (PURPLE)] 30 tablet 0     Sig: TAKE 1 TABLET(2 MG) BY MOUTH DAILY       There is no refill protocol information for this order

## 2021-06-06 NOTE — PROGRESS NOTES
Wt Readings from Last 20 Encounters:   03/16/20 (!) 82 lb 9.6 oz (37.5 kg)   02/14/20 (!) 90 lb 8 oz (41.1 kg)   01/28/20 (!) 97 lb 11.2 oz (44.3 kg)   01/06/20 106 lb 14.4 oz (48.5 kg)   12/31/19 106 lb (48.1 kg)   12/27/19 107 lb 12.8 oz (48.9 kg)   12/22/19 (!) 99 lb (44.9 kg)   11/29/19 (!) 99 lb (44.9 kg)   10/04/19 (!) 95 lb 9.6 oz (43.4 kg)   07/11/19 (!) 92 lb (41.7 kg)   07/02/19 (!) 95 lb (43.1 kg)   06/27/19 (!) 97 lb 11.2 oz (44.3 kg)   06/24/19 (!) 98 lb (44.5 kg)   05/28/19 (!) 96 lb (43.5 kg)   12/11/18 (!) 87 lb 9.6 oz (39.7 kg)   11/12/18 (!) 87 lb (39.5 kg)   11/05/18 (!) 86 lb 6.4 oz (39.2 kg)   10/08/18 (!) 85 lb (38.6 kg)   06/20/18 (!) 87 lb 3.2 oz (39.6 kg)   06/04/18 (!) 90 lb (40.8 kg)     SpO2 Readings from Last 20 Encounters:   03/16/20 95%   02/14/20 99%   01/30/20 95%   01/29/20 93%   01/13/20 97%   01/10/20 94%   12/31/19 98%   12/27/19 97%   12/22/19 95%   11/29/19 96%   10/04/19 98%   07/11/19 96%   07/02/19 98%   06/27/19 99%   06/24/19 98%   05/28/19 96%   12/11/18 98%   11/23/18 97%   11/19/18 98%   11/16/18 96%

## 2021-06-06 NOTE — TELEPHONE ENCOUNTER
INR result is   INR   Date Value Ref Range Status   03/03/2020 5.00 (!) 0.9 - 1.1 Final       3/05/20                           2.8    Will the patient be seen, or did they already see, MD or CNP today? No    Most Recent Warfarin dose day/week  Sunday Monday Tuesday Wednesday Thursday Friday Saturday Sunday Monday Tuesday Wednesday Thursday Friday Saturday    2 mg held held          Has the patient missed any doses of Coumadin, Warfarin, Jantoven in the past 7 days? No    Has the patients medications changed since the last visit? No    Has the patient experienced any bleeding recently? No       Has the patient experienced any injuries or illness recently? No    Has the patient experienced any 'new' shortness of breath, severe headaches, or changes in vision recently? No    Has the patient had any changes in their diet, or alcohol consumption? No    Is the patient here today to prepare for any type of upcoming surgery, procedure, or for a cardioversion procedure? No    What phone number can we reach the patient at today? [facility]  [phone].

## 2021-06-06 NOTE — PATIENT INSTRUCTIONS - HE
To consider having your blood work tested at home, you could call [facility] ([facility]) at [phone].  They will come out and draw your blood work at home.    If you have [employer], or [employer] insurance, this service is usually covered by your insurance.  If you do not, they will charge to about $75 per visit.  By calling them, you could find out what your cost would likely be.    We use this service often for patients who have INRs done frequently for their anticoagulation medication.  If this is why we are using it for you, please make sure that the company sends the results of your INR to the following:    Anticoagulation nurse contact numbers:    Phone [phone]  Fax [phone]    Thank you.    Your can start this when home care is done.

## 2021-06-06 NOTE — PATIENT INSTRUCTIONS - HE
To consider having your blood work tested at home, you could call [facility] ([facility]) at [phone].  They will come out and draw your blood work at home.    If you have [employer], [employer], or [employer] insurance, this service is usually covered by your insurance.  If you do not, they will charge to about $75 per visit.  By calling them, you could find out what your cost would likely be.    We use this service often for patients who have INRs done frequently for their anticoagulation medication.  If this is why we are using it for you, please make sure that the company sends the results of your INR to the following:    Anticoagulation nurse contact numbers:    Phone [phone]  Fax [phone]    Thank you.       ______________________________________________________________________      1. Stop the trazodone.  2. Try off the furosemide to help with urination.  3. Blood work today.

## 2021-06-06 NOTE — TELEPHONE ENCOUNTER
Who is calling:  Patient's daughter  Reason for Call:  Caller stated that she would like a call back from George Park MD or the nurse on regards to patient's medications. Caller stated that she would like a clarification on what she's taking.   Date of last appointment with primary care:   Okay to leave a detailed message: Yes  [phone]

## 2021-06-06 NOTE — TELEPHONE ENCOUNTER
Medication Request    Medication name:   furosemide (LASIX) 20 MG tablet   0  1/30/2020      Sig - Route: Take 1 tablet (20 mg total) by mouth daily        Requested Pharmacy:   [id number] [address] AT [facility] [place] [address]      Reason for request:   Daughter states this medication was given to her mom in the hospital, question if she should continue therapy? If so she will need a script sent to her pharmacy, otherwise she is comfortable waiting to discuss medication at apt on 03/16/2020.    When did you use medication last?:  03/09/2020    Patient offered appointment:    patient declined     Okay to leave a detailed message:   yes    Please send script to patients pharmacy and inform the patient of the outcome to this request.

## 2021-06-06 NOTE — TELEPHONE ENCOUNTER
Orders being requested: Home OT 1 X 4 weeks  Reason service is needed/diagnosis:  to work on ADL; transfrers and mobility, fall prevention, and care giver education  When are orders needed by: ASAP  Where to send Orders: Phone:  [phone]  Okay to leave detailed message?  Yes

## 2021-06-06 NOTE — TELEPHONE ENCOUNTER
Let patient know that Dr. Park is out of the clinic this week but that I reviewed her last hospital note, TCU note, and his office visit. I believe she should stay on this medication until she is seen again with Dr. Park on 3/16 so I sent a 1 month refill of the medication.

## 2021-06-06 NOTE — TELEPHONE ENCOUNTER
Please continue until the visit.  I reviewed the medication.    George Park MD  General Internal Medicine  [facility]  3/5/2020, 5:52 PM

## 2021-06-06 NOTE — TELEPHONE ENCOUNTER
Orders being requested: Physical Therapy 2 times a week for 3 weeks and 1 time a week for 1 week.   Reason service is needed/diagnosis: gait, balance and strengthening  When are orders needed by: 2/13/2020  Where to send Orders: Phone:  [phone]  Okay to leave detailed message?  Yes

## 2021-06-06 NOTE — TELEPHONE ENCOUNTER
INR result is 1.8   INR   Date Value Ref Range Status   03/10/2020 4.70 (!) 0.9 - 1.1 Final       Will the patient be seen, or did they already see, MD or CNP today? No    Most Recent Warfarin dose day/week  Sunday Monday Tuesday Wednesday Thursday Friday Saturday     held held 2 mg 1 mg 1 mg     Sunday Monday Tuesday Wednesday Thursday Friday Saturday   1 mg missed            Has the patient missed any doses of Coumadin, Warfarin, Jantoven in the past 7 days? Yes 3/16    Has the patients medications changed since the last visit? Yes stopped Furosemide 3/16  Trazodone 50 mg changed to PRN  Seironolactone 25 mg 1/X daily start 3/16    Has the patient experienced any bleeding recently? No    Has the patient experienced any injuries or illness recently? No    Has the patient experienced any 'new' shortness of breath, severe headaches, or changes in vision recently? No    Has the patient had any changes in their diet, or alcohol consumption? No    Is the patient here today to prepare for any type of upcoming surgery, procedure, or for a cardioversion procedure? No    What phone number can we reach the patient at today? [phone] Monique.

## 2021-06-06 NOTE — TELEPHONE ENCOUNTER
Orders being requested: Home speech therapy for two times for 2 weeks  Reason service is needed/diagnosis: To address aphasia  When are orders needed by: Friday, 2/21/20  Where to send Orders: Phone:  [phone]  Okay to leave detailed message?  Yes

## 2021-06-06 NOTE — TELEPHONE ENCOUNTER
FYI - Status Update  Who is Calling: Carleen physical therapist from [facility]  Update: Patients resting heart rate was between 109 and 110 and it's usually in the 80's  Okay to leave a detailed message?:  Yes - confidential voicemail

## 2021-06-06 NOTE — PROGRESS NOTES
[facility] Patient Outreach:     Discharge Date::  2/7/20      Reason for TCU stay (discharge diagnosis)::  Acute on chronic CHF, A-fib, hypoxia      Are you feeling better, the same or worse since your discharge?:  Patient is feeling better          As part of your discharge plan, did they discuss home care with you?: Yes        Have your seen them yet, or are they scheduled to visit?: Yes                Do you have any follow up visits scheduled with your PCP or Specialist?:  Yes, with PCP      (RN) Is it scheduled soon enough (3-5 days)?: No        (RN) Is the patient okay with moving appointment up (if RN feels appropriate)?: No (Patient is seeing PCP on 2/14/20.  )

## 2021-06-06 NOTE — TELEPHONE ENCOUNTER
Question following Office Visit  When did you see your provider: 3/16/2020  What is your question: Caller states she thought a prescription for atorvastatin and spironolactone was being sent in yesterday, but the pharmacy has not yet received them. She is requesting these to be sent to [facility] #[id number]  Okay to leave a detailed message: Yes

## 2021-06-06 NOTE — TELEPHONE ENCOUNTER
INR result is 2.6  INR   Date Value Ref Range Status   02/10/2020 1.90 (!) 0.9 - 1.1 Final       Will the patient be seen, or did they already see, MD or CNP today? No    Most Recent Warfarin dose day/week  Sunday Monday Tuesday Wednesday Thursday Friday Saturday       unk unk 2     Sunday Monday Tuesday Wednesday Thursday Friday Saturday   2 2 2 2          Has the patient missed any doses of Coumadin, Warfarin, Jantoven in the past 7 days? No    Has the patients medications changed since the last visit? No    Has the patient experienced any bleeding recently? No    Has the patient experienced any injuries or illness recently? No    Has the patient experienced any 'new' shortness of breath, severe headaches, or changes in vision recently? No    Has the patient had any changes in their diet, or alcohol consumption? No    Is the patient here today to prepare for any type of upcoming surgery, procedure, or for a cardioversion procedure? No    What phone number can we reach the patient at today? Monique [phone]

## 2021-06-06 NOTE — TELEPHONE ENCOUNTER
Called Noemy to go over medications.  Answered questions that she had.    Noemy questions if pt needs to take Atorvastatin and Furosemide?    Okay to leave a detailed message: Yes  [phone]    Pt has appointment 3/16/20.

## 2021-06-06 NOTE — TELEPHONE ENCOUNTER
Okay to proceed.    George Park MD  General Internal Medicine  [facility]  2/21/2020, 11:59 AM

## 2021-06-07 NOTE — PROGRESS NOTES
ANTICOAGULATION  MANAGEMENT: Discharge Review    Lana Lantigua chart reviewed for anticoagulation continuity of care    Emergency room visit on  4/3 for Dislocation of jaw.    Discharge disposition: Home    INR Results:     No results for input(s): INR in the last 168 hours.    Warfarin inpatient management: not applicable    Warfarin discharge instructions: home regimen continued     Medication Changes Affecting Anticoagulation: No    Additional Factors Affecting Anticoagulation: No    Plan     No adjustment to anticoagulation plan needed      Patient not contacted    Carmen Crespo, RN

## 2021-06-07 NOTE — TELEPHONE ENCOUNTER
ANTICOAGULATION  MANAGEMENT PROGRAM    Lana Lantigua is overdue for INR check.     Left message to call and schedule INR appointment as soon as possible.      Carmen Crespo, RN

## 2021-06-07 NOTE — TELEPHONE ENCOUNTER
Please call patient's  related to her labs (patient cannot talk) -    ______________________________________________________________________     Home phone:  [phone] (home)     Cell phone:   Telephone Information:   Mobile [phone]       Other contacts:  Name Home Phone Work Phone Mobile Phone Relationship Lgl Grd   [employer]   [phone] Spouse No   [employer]   [phone] Child No     ______________________________________________________________________     Her liver tests are elevated.      George Park MD  [facility]  3/19/2020, 1:35 PM     ______________________________________________________________________    Recent Results (from the past 240 hour(s))   INR   Result Value Ref Range    INR 4.70 (!) 0.9 - 1.1   GGT (Gamma GT)   Result Value Ref Range    GGT (Gamma GT) 136 (H) 0 - 50 U/L   Iron and Transferrin Iron Binding Capacity   Result Value Ref Range    Iron 31 (L) 42 - 175 ug/dL    Transferrin 331 212 - 360 mg/dL    Transferrin Saturation, Calculated 8 (L) 20 - 50 %    Transferrin IBC, Calculated 413 313 - 563 ug/dL   Ferritin   Result Value Ref Range    Ferritin 30 10 - 130 ng/mL   Comprehensive Metabolic Panel   Result Value Ref Range    Sodium 147 (H) 136 - 145 mmol/L    Potassium 3.3 (L) 3.5 - 5.0 mmol/L    Chloride 107 98 - 107 mmol/L    CO2 25 22 - 31 mmol/L    Anion Gap, Calculation 15 5 - 18 mmol/L    Glucose 127 (H) 70 - 125 mg/dL    BUN 29 (H) 8 - 28 mg/dL    Creatinine 0.90 0.60 - 1.10 mg/dL    GFR MDRD Af Amer >60 >60 mL/min/1.73m2    GFR MDRD Non Af Amer 58 (L) >60 mL/min/1.73m2    Bilirubin, Total 0.7 0.0 - 1.0 mg/dL    Calcium 8.8 8.5 - 10.5 mg/dL    Protein, Total 6.9 6.0 - 8.0 g/dL    Albumin 3.5 3.5 - 5.0 g/dL    Alkaline Phosphatase 213 (H) 45 - 120 U/L    AST 49 (H) 0 - 40 U/L    ALT 55 (H) 0 - 45 U/L   Vitamin B12   Result Value Ref Range    Vitamin B-12 1,405 (H) 213 - 816 pg/mL   HM2(CBC w/o Differential)   Result Value Ref  Range    WBC 10.2 4.0 - 11.0 thou/uL    RBC 4.59 3.80 - 5.40 mill/uL    Hemoglobin 10.9 (L) 12.0 - 16.0 g/dL    Hematocrit 35.6 35.0 - 47.0 %    MCV 77 (L) 80 - 100 fL    MCH 23.7 (L) 27.0 - 34.0 pg    MCHC 30.6 (L) 32.0 - 36.0 g/dL    RDW 15.2 (H) 11.0 - 14.5 %    Platelets 265 140 - 440 thou/uL    MPV 8.2 7.0 - 10.0 fL   CK Total   Result Value Ref Range    CK, Total 54 30 - 190 U/L   Lipid Cascade RANDOM   Result Value Ref Range    Cholesterol 153 <=199 mg/dL    Triglycerides 61 <=149 mg/dL    HDL Cholesterol 64 >=50 mg/dL    LDL Calculated 77 <=129 mg/dL    Patient Fasting > 8hrs? Unknown    INR   Result Value Ref Range    INR 1.80 (!) 0.9 - 1.1       ______________________________________________________________________

## 2021-06-07 NOTE — TELEPHONE ENCOUNTER
ANTICOAGULATION  MANAGEMENT PROGRAM    Lana Lantigua is overdue for INR check.     First reminder letter sent.       Krissy Grijalva RN

## 2021-06-07 NOTE — TELEPHONE ENCOUNTER
Who is calling:  Daughter Angelique  Reason for Call:  Regarding letter about scheduling an INR appointment.   Date of last appointment with primary care:   Okay to leave a detailed message: Yes    Patient daughter is calling to notify that patient is no longer taking Warfarin. Patient is in hospice care.

## 2021-06-07 NOTE — TELEPHONE ENCOUNTER
ANTICOAGULATION  MANAGEMENT PROGRAM    Lana Lantigua is overdue for INR check.     Left message to call and schedule INR appointment as soon as possible.     Spoke with [facility] as next INR was due with them.  They report pt was discharged on 3/24 and that an INR was NOT done at that visit      Carmen Crespo, RN

## 2021-06-07 NOTE — TELEPHONE ENCOUNTER
Patient Returning Call    Reason for call:  lmtcb    Information relayed to patient:  Daughter informed of message from PCP and verbalize an understanding.    Patient has additional questions:  No  If YES, what are your questions/concerns:  NA    Okay to leave a detailed message?: No call back needed

## 2021-06-07 NOTE — TELEPHONE ENCOUNTER
Okay to evaluate.    George Park MD  General Internal Medicine  [facility]  3/19/2020, 5:05 PM

## 2021-06-07 NOTE — TELEPHONE ENCOUNTER
Please call patient's  related to her labs (patient cannot talk) -   ______________________________________________________________________   Home phone: [phone] (home)   Cell phone:       Telephone Information:   Mobile [phone]   Other contacts:   Name Home Phone Work Phone Mobile Phone Relationship Lgl Grd   CASH TRIPP   [phone] Spouse No   ALISTAIR TR[employer]P   [phone] Child No   ______________________________________________________________________     I need you to call the patient's  again because I did not finish up the note.    Her liver tests are elevated.  I suspect that this is due to her not eating enough and sludging in her gallbladder.  We could do an ultrasound of her gallbladder, they could also work on having her eat a little bit better.    Her iron levels are also low.  I would like her to stop aspirin at this time and continue on the warfarin alone.  The warfarin should be enough to prevent stroke and the aspirin may be adding to bleeding issues which would make the iron level low.  We could have her take an iron supplement twice a week at this time and it might be a good idea.  I am going to send in a prescription for this to her pharmacy.    Her sodium is slightly high.  This is likely due to not getting enough fluid daily.  She should work on drinking 1-2 more glasses of water a day than she currently is.    Her potassium is a little bit low but this should be better if she cuts out the furosemide like we discussed at the visit.    Her B12 level is excellent.  Her cholesterol is excellent.    It would be important to see her again in person in 4 weeks.  You can help her to schedule a visit.    Please check with the  if he wants 1 of his kids called with the results as well.  There has been a daughter who is been closely involved.    George Park MD   [facility]   3/19/2020, 1:35 PM    ______________________________________________________________________         Recent Results (from the past 240 hour(s))   INR   Result Value Ref Range    INR 4.70 (!) 0.9 - 1.1   GGT (Gamma GT)   Result Value Ref Range    GGT (Gamma GT) 136 (H) 0 - 50 U/L   Iron and Transferrin Iron Binding Capacity   Result Value Ref Range    Iron 31 (L) 42 - 175 ug/dL    Transferrin 331 212 - 360 mg/dL    Transferrin Saturation, Calculated 8 (L) 20 - 50 %    Transferrin IBC, Calculated 413 313 - 563 ug/dL   Ferritin   Result Value Ref Range    Ferritin 30 10 - 130 ng/mL   Comprehensive Metabolic Panel   Result Value Ref Range    Sodium 147 (H) 136 - 145 mmol/L    Potassium 3.3 (L) 3.5 - 5.0 mmol/L    Chloride 107 98 - 107 mmol/L    CO2 25 22 - 31 mmol/L    Anion Gap, Calculation 15 5 - 18 mmol/L    Glucose 127 (H) 70 - 125 mg/dL    BUN 29 (H) 8 - 28 mg/dL    Creatinine 0.90 0.60 - 1.10 mg/dL    GFR MDRD Af Amer >60 >60 mL/min/1.73m2    GFR MDRD Non Af Amer 58 (L) >60 mL/min/1.73m2    Bilirubin, Total 0.7 0.0 - 1.0 mg/dL    Calcium 8.8 8.5 - 10.5 mg/dL    Protein, Total 6.9 6.0 - 8.0 g/dL    Albumin 3.5 3.5 - 5.0 g/dL    Alkaline Phosphatase 213 (H) 45 - 120 U/L    AST 49 (H) 0 - 40 U/L    ALT 55 (H) 0 - 45 U/L   Vitamin B12   Result Value Ref Range    Vitamin B-12 1,405 (H) 213 - 816 pg/mL   HM2(CBC w/o Differential)   Result Value Ref Range    WBC 10.2 4.0 - 11.0 thou/uL    RBC 4.59 3.80 - 5.40 mill/uL    Hemoglobin 10.9 (L) 12.0 - 16.0 g/dL    Hematocrit 35.6 35.0 - 47.0 %    MCV 77 (L) 80 - 100 fL    MCH 23.7 (L) 27.0 - 34.0 pg    MCHC 30.6 (L) 32.0 - 36.0 g/dL    RDW 15.2 (H) 11.0 - 14.5 %    Platelets 265 140 - 440 thou/uL    MPV 8.2 7.0 - 10.0 fL   CK Total   Result Value Ref Range    CK, Total 54 30 - 190 U/L   Lipid Cascade RANDOM   Result Value Ref Range    Cholesterol 153 <=199 mg/dL    Triglycerides 61 <=149 mg/dL    HDL Cholesterol 64 >=50 mg/dL    LDL Calculated 77 <=129 mg/dL    Patient Fasting > 8hrs? Unknown     INR   Result Value Ref Range    INR 1.80 (!) 0.9 - 1.1

## 2021-06-07 NOTE — TELEPHONE ENCOUNTER
Left message for pt to call back.  Pt is scheduled to see Dr. Park 5/29/20, Due to COVID Dr. Park's schedule has changed he is doing video visits the week of 5/29/20.    If pt calls back please assist with scheduling pt for an video visit (appointment will have to be changed to fit new schedule template) or pt can be rescheduled for an in office visit the week of 5/18/20 or 6/16/20.    If the patient has a smartphone with a camera, we can schedule a video visit LONG using DOXIMITY or FACETIME without having the patient signing up for AMWELL.      If the patient wants to use and IPAD, tablet or computer, then the patient should sign up for AMWELL (AW TOUCHPOINT) prior to the visit.  We can also use FACETUnioncy if they wish.     If patient has no device with a webcam, then please schedule a telephone visit for their follow up.     Please note DOXIMITY, AMWELL, or FACETIME in the reason for visit when scheduling the appointment.   Also note contact information (cell phone or email be sure email is in the chart).

## 2021-06-07 NOTE — TELEPHONE ENCOUNTER
Orders being requested: Hospice is asking for an order to have hospice skilled nursing  eval and treat.    Reason service is needed/diagnosis: Nurse does not know.  Says that is what she need to determine.   When are orders needed by: ASAP  Where to send Orders: Fax: [phone]  Okay to leave detailed message?  No

## 2021-06-08 NOTE — TELEPHONE ENCOUNTER
Called and lvm #3 for patient to call back and schedule appointment.    Please assist in rescheduling appointment     MyChart message sent and letter mailed to address on file

## 2021-06-08 NOTE — TELEPHONE ENCOUNTER
Who is calling:  Patient's Daughter  Reason for Call:  Daughter calling to check on medication refill.  Writer noticed there are available refills left and to contact pharmacy.  Daughter agrees.  Date of last appointment with primary care: N/A  Okay to leave a detailed message: No

## 2021-06-11 NOTE — PROGRESS NOTES
[facility] Consult Note    Date of Service:  7/5/2017    Requesting Provider: PCP    Chief Complaint: LLE swelling; LLE cellulitis    History of Present Illness: Lana Lantigua is being seen at the Vascular Clinic today regarding LLE swelling and cellulitis. They arrive to the clinic today with daughter Noemy. The patient reports that several months ago; March when she was traveling in [place];  the leg became red; this progressively got worse and then began to weep.  She was seen in the ED recently was treated with an antibiotic; she does not recall the name; was taking this every 6 hours; had u/s this was negative for DVT. She tolerated the antibiotic well.  Was previously using kleenex; changing every couple of hours; using tap water; no ointments; applying Jergen's lotion intermittantly. Reports pain of 5/10 intermittent; shooting pain; doesn't know what makes this better or worse; currently using ibuprofen for pain. Has used nothing as compression in the past. Denies any fevers, chills, or generalized ill feeling. +history of cancer gastric cancer; 1994; underwent total gastrectomy; no chemo; no radiation; no recurrence. Sleeps in a bed with legs elevated. Pt no longer has their uterus and ovaries. Lives with ; he is 90; he is in good health; never smoker; has 7 children. No hx of dvt, no fractures; no joint replacements. History of vein stripping in the past; multiple procedures to both legs.     Review of Systems:   Constitutional:  negative  for fever, chills or night sweats  EENTM: positive for glasses;  positive [place]  GI:  negative for nausea/vomiting;  negative for constipation  negative diarrhea;  negative for fecal incontinence negative weight loss  :  negative dysuria, incontinence  MS: patient is ambulatory;  does not use assistive devices; could walk a few blocks  Cardiac:  negative   Respiratory:  negative SOB  Endocrine:  negative diabetes  Psych:  negative  depression/anxiety    Past Medical History:    Past Medical History:   Diagnosis Date     B12 deficiency      Gastric carcinoma  1994     Nonsmoker      Osteoporosis      White coat hypertension        Surgical History:   Past Surgical History:   Procedure Laterality Date     APPENDECTOMY  1960     CATARACT EXTRACTION Bilateral      CHOLECYSTECTOMY  1960     HYSTERECTOMY  1960     TONSILLECTOMY  age 20      TOTAL GASTRECTOMY  12/01/1994       Medications:    Current Outpatient Prescriptions:      calcium carbonate (OS-CIEAR) 600 mg (1,500 mg) tablet, Take 600 mg by mouth daily. , Disp: , Rfl:      cholecalciferol, vitamin D3, (VITAMIN D3) 2,000 unit Tab, Take by mouth daily., Disp: , Rfl:      multivitamin with minerals (THERA-M) 9 mg iron-400 mcg Tab tablet, Take 1 tablet by mouth daily., Disp: , Rfl:      cyanocobalamin 1,000 mcg/mL injection, INJECT 1 ML IN THE MUSCLE ONCE MONTHLY, Disp: 10 mL, Rfl: 3    Allergies: No Known Allergies    Family history:   Family History   Problem Relation Age of Onset     Heart failure Father      Lung cancer Sister      Hypertension Mother      Varicose Veins Mother      Cancer Son       choriocarcinoma       Social History:   Social History     Social History     Marital status:      Spouse name: N/A     Number of children: 7     Years of education: N/A     Occupational History     Homemaker and nursing Retired     Social History Main Topics     Smoking status: Never Smoker     Smokeless tobacco: Not on file     Alcohol use No     Drug use: Not on file     Sexual activity: Not on file     Other Topics Concern     Not on file     Social History Narrative    Has resided in [place] in the past.  Generally has lived in [place], [place].  [place] in [place] at this time.        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        Physical Exam  Vitals: Blood pressure 142/74, pulse 60, temperature 97.9  F (36.6  C), temperature source Temporal, resp. rate  14.  General: This is a 89 y.o. female who appears their reported age, not in acute distress; very thin  Head: normocephalic, Atraumatic; wearing glasses; non-icteric sclera; no exudate; mild hearing loss   Respiratory: Clear throughout all lung fields; unlabored breathing; no cough   Cardiac: Regular, Rate and Rhythm, no murmurs appreciated   Skin: Uniformly warm and dry  Psych: Alert and oriented x4; calm and cooperative throughout exam  Abdomen: Normal bowel sounds. Soft, symmetric, no guarding or rigidity, nontender with palpation.  No organomegaly or masses palpated.   Lymphatics: No palpable nodes to bilateral groin   Extremities: BLE with edema; left worse than right; see measures below; skin over the left leg is taut and shin; there is erythema; this is minimally warm; not tender to palpation; there is an area to the left posterior calf region which measures approx 6x6.5x0cm with partial thickness tissue loss; weeping serous fluid; no odor; no green drainage; strength testing revealed 4/4 to BLEs. Extensive varocosities on the feet; nails are elongated; slightly flared; webbing clear; nails 1-5 on the right and nails 2 on the left were debrided and filed; tolerated well; no complications    Sensation: Intact to pinprick and light touch throughout lower extremities bilaterally Using a monofiliment the patient was able to identify 7/7 sites on the right plantar foot and 7/7 sites on the left plantar foot.    Peripheral Vascular: bilateral dorsalis pedis, posterior tibial pulses , using a handheld doppler these were strong; easily found and biphasic in nature.  Good capillary refill. No unusual venous distention. Positive for spider veins, telangiectasias, hemosiderin deposition or hyperpigmentation and fibrosis or scarring      Circumferential volume measures:    Vasc Edema 7/5/2017   Right just above MTP 20.5   Right Ankle 20   Right Widest Calf 30   Right Thigh Up 10cm 39.5   Left - just above MTP 20.5    Left Ankle 21   Left Widest Calf 34   Left Thigh Up 10cm 44       Ulceration(s)/Wound(s):     Wound 07/05/17 left lateral leg (Active)   Pre Size Length 6 7/5/2017  3:00 PM   Pre Size Width 6.5 7/5/2017  3:00 PM   Pre Total Sq cm 39 7/5/2017  3:00 PM       Laboratory studies:   No results found for: SEDRATE  Lab Results   Component Value Date    CREATININE 0.77 06/18/2017     No results found for: HGBA1C  Lab Results   Component Value Date    BUN 14 06/18/2017     Lab Results   Component Value Date    ALBUMIN 3.7 06/26/2017     Vitamin D, Total (25-Hydroxy)   Date Value Ref Range Status   05/31/2013 23.1 (L) 30.0 - 80.0 ng/mL Final     Comment:                    Deficiency              <10.0 ng/mL               Insufficiency       10.0-29.9 ng/mL               Sufficiency         30.0-80.0 ng/mL               Toxicity (possible)    >100.0 ng/mL             Impression:   Venous stasis ulcer to the left leg  Varicose veins; hx of vein stripping bilaterally  Edema  Cellulitis  Nail abnormality     Assessment/Plan:  1. Excisional debridement of all the ulcer(s) was not recommended today as this was 100% clean; very superficial    2. Edema. We spoke at length regarding their swelling, potential causes, and treatment options. Answered all questions. Their swelling is likely multifactorial including but not limited to the following: dependency of the limbs for most hours of the day, reduced activity with reduced calf pump mechanism, congestive heart failure, venous hypertension, valvular incompetence and history of multiple procedures on the legs. I would like to first reduce their swelling down using 2 layer compression wraps and then transition them into compression garments; such as compression stockings or velcro wraps. The patient should continue to elevate their legs periodically throughout the day. Continue to take their diuretics per their PMD recommendations. Will provide them with an [employer] for  light weight compression on the right leg.      3. Treatment will apply viscopaste to the weeping area; then ABD pad; secure with roll gauze; change weekly at the clinic; I would prefer to not have to prescribe oral antibiotics again; will see how she responds to compression therapy alone    4. Offloading: na    5. Nutrition: her last albumin was normal; weight stable; hx of gastrectomy; has some vitamin deficiencies will hold on the nutritional panel at this time.    6. Nail Elongation: nails 1-5 on the right and nail 2 on the left were debrided tolerated well     Right Lower Extremity Left Lower Extremity   Class A Findings - Q7     Non-Traumatic amputation of foot or integral skeletal portion thereof          Class B Findings - Q8 with 2 Class B findings     Absent DP/PT pulse     Advanced Trophic Changes (Three are Required x x        Absent or decreased hair growth x x        Nail Changes x x        Pigment Changes x x        Skin Texture Changes x x        Skin Color Changes x x        Class C Findings Q9 with 1 class B and 2 Class C Findings     Claudication     Temperature Changes     Edema x x   Paresthesias     Burning  x           Patient to return to clinic in 1 week(s) for re-evaluation. They were instructed to call the clinic sooner with any further questions or concerns. Answered all questions.    Matilde Erickson DNP, RN, CNP, [facility]  [phone]

## 2021-06-11 NOTE — PROGRESS NOTES
Follow up Vascular Visit       Date of Service:7/12/2017    Date Last Seen: 7/5/2017; 7/5/2017    Chief Complaint: left leg weeping ulcer; edema; cellulitis    History:   Past Medical History:   Diagnosis Date     B12 deficiency      Gastric carcinoma  1994     Nonsmoker      Osteoporosis      White coat hypertension        Pt returns to the Vascular clinic with regards to their left leg weeping ulcer; edema and cellulitis. Pt arrives today with her daughter. Upon our initial visit she was completing her antibiotics; we applied viscopaste to the weeping area and 2 layer. She felt the 2 layer was too tight when she was trying to sleep so she partially removed this and attempted to reapply. She denies fevers, chills. No pain in the leg now..     Allergies: Review of patient's allergies indicates no known allergies.    Physical Exam:    /76  Pulse 80  Temp 97.9  F (36.6  C) (Temporal)   Resp 14    General:  Patient presents to clinic in no apparent distress.  Head: normocephalic atraumatic  Psychiatric:  Alert and oriented x3.   Respiratory: unlabored breathing; no cough  Integumentary:  Skin is uniformly warm, dry and pink.    Wound #1 Location: left lateral leg  Size: 6L x 7.5W x 0cmdepth.  No sinus tract present, Wound base: red; weeping; not an overt ulcer; desquamation; this area was debrided to remove macerated skin and disrupt biofilm  No undermining present. Periwound: + denudement, erythema, induration, maceration or warmth.      Circumferential volume measures:    Vasc Edema 7/5/2017 7/12/2017   Right just above MTP 20.5 -   Right Ankle 20 -   Right Widest Calf 30 -   Right Thigh Up 10cm 39.5 -   Left - just above MTP 20.5 20   Left Ankle 21 21   Left Widest Calf 34 31   Left Thigh Up 10cm 44 -       Ulceration(s)/Wound(s):     Wound 07/05/17 left lateral leg (Active)   Pre Size Length 6 7/12/2017  2:00 PM   Pre Size Width 7.5 7/12/2017  2:00 PM   Pre Total Sq cm 45 7/12/2017  2:00 PM       Lab  Values    No results found for: SEDRATE  Lab Results   Component Value Date    CREATININE 0.77 06/18/2017     No results found for: HGBA1C  Lab Results   Component Value Date    BUN 14 06/18/2017     Lab Results   Component Value Date    ALBUMIN 3.7 06/26/2017     Vitamin D, Total (25-Hydroxy)   Date Value Ref Range Status   05/31/2013 23.1 (L) 30.0 - 80.0 ng/mL Final     Comment:                    Deficiency              <10.0 ng/mL               Insufficiency       10.0-29.9 ng/mL               Sufficiency         30.0-80.0 ng/mL               Toxicity (possible)    >100.0 ng/mL             Impression:  Venous stasis dermatitis/ulcer to the left leg  Varicose veins; hx of vein stripping bilaterally  Edema  Cellulitis  Nail abnormality       Are any of these wounds new today: No; Location: na    Assessment/Plan:          1. Excisional debridement of the ulcer(s) was recommended today, after consent was obtained and 2% Xylocaine was applied using a sterile curet the epidermal and dermal was sharply debrided for a total square cm of 45. The devitalized and necrotic tissue was removed and the wounds appeared much  afterwards. The patient tolerated this well.           2. Edema: she did poorly with the 2 layer; dangerously modifying these on her own and incorrectly; will be a safer option to put her in 1-2 layers of tubigrips           3.  Wound treatment:will try TCM cream daily for 2 weeks and see if we can quiet down the area; cover with oil immersion; abd; roll gauze; pt feels she can change this independently; will consider home care if she can't do this           4. Nutrition: her last albumin was normal; weight stable; hx of gastrectomy; has some vitamin deficiencies will hold on the nutritional panel at this time.              5. Offloading: na     Patient will follow up with me in 2 weeks for reevaluation. They were instructed to call the clinic sooner with any signs or symptoms of infection or any  further questions/concerns. Answered all questions.    Matilde Erickson DNP, RN, CNP, [facility]  [facility] [place]  [phone]

## 2021-06-12 NOTE — PROGRESS NOTES
Follow up Vascular Visit       Date of Service:7/26/2017    Date Last Seen: 7/5/2017; 7/5/2017    Chief Complaint: left leg weeping ulcer; edema; cellulitis    History:   Past Medical History:   Diagnosis Date     B12 deficiency      Gastric carcinoma  1994     Nonsmoker      Osteoporosis      White coat hypertension        Pt returns to the Vascular clinic with regards to their left leg weeping ulcer; edema and cellulitis. Pt arrives today with her . Upon our initial visit she was completing her antibiotics; we applied viscopaste to the weeping area and 2 layer. She felt the 2 layer was too tight when she was trying to sleep so she partially removed this and attempted to reapply. We felt it was unsafe to continue wrapping her with the 2 layer; so we ordered TCM cream and tubigrips. Today she did not recall if she had picked up the TCM cream but later remembered she did; she arrives with her tubigrip just around the ankle. She denies fevers, chills. No pain in the leg now..     Allergies: Review of patient's allergies indicates no known allergies.    Physical Exam:    /68  Pulse 88  Temp 99  F (37.2  C) (Temporal)   Resp 14    General:  Patient presents to clinic in no apparent distress.  Head: normocephalic atraumatic  Psychiatric:  Alert and oriented x3.   Respiratory: unlabored breathing; no cough  Integumentary:  Skin is uniformly warm, dry and pink.    Wound #1 Location: left lateral leg  Size: resolved; no longer with rash; some dry skin  Left forearm with maculopapular rash which measures 10x5cm; this was not warm to touch; does not know how this happened    Circumferential volume measures:    Vasc Edema 7/5/2017 7/12/2017 7/26/2017   Right just above MTP 20.5 - -   Right Ankle 20 - -   Right Widest Calf 30 - -   Right Thigh Up 10cm 39.5 - -   Left - just above MTP 20.5 20 19   Left Ankle 21 21 19.1   Left Widest Calf 34 31 31.5   Left Thigh Up 10cm 44 - -       Ulceration(s)/Wound(s):      Wound 07/05/17 left lateral leg (Active)   Pre Size Length 6 7/12/2017  2:00 PM   Pre Size Width 7.5 7/12/2017  2:00 PM   Pre Total Sq cm 45 7/12/2017  2:00 PM       Lab Values    No results found for: SEDRATE  Lab Results   Component Value Date    CREATININE 0.77 06/18/2017     No results found for: HGBA1C  Lab Results   Component Value Date    BUN 14 06/18/2017     Lab Results   Component Value Date    ALBUMIN 3.7 06/26/2017     Vitamin D, Total (25-Hydroxy)   Date Value Ref Range Status   05/31/2013 23.1 (L) 30.0 - 80.0 ng/mL Final     Comment:                    Deficiency              <10.0 ng/mL               Insufficiency       10.0-29.9 ng/mL               Sufficiency         30.0-80.0 ng/mL               Toxicity (possible)    >100.0 ng/mL             Impression:  Venous stasis dermatitis/ulcer to the left leg  Varicose veins; hx of vein stripping bilaterally  Edema  Cellulitis  Nail abnormality       Are any of these wounds new today: No; Location: na    Assessment/Plan:          1. Excisional debridement of the ulcer(s) was not recommended today as her skin was intact           2. Edema: she did poorly with the 2 layer; dangerously modifying these on her own and incorrectly; she is not wearing her tubigrips correctly; will provide her with a sample of compression stockings and have her order from [place] [employer]           3.  Wound treatment:will try TCM cream daily to her left arm rash; derm if this does not improve; lotion to skin daily           4. Nutrition: her last albumin was normal; weight stable; hx of gastrectomy; has some vitamin deficiencies will hold on the nutritional panel at this time.              5. Offloading: na     Patient will follow up with me PRN for reevaluation. They were instructed to call the clinic sooner with any signs or symptoms of infection or any further questions/concerns. Answered all questions.    Matilde Erickson DNP, RN, CNP, CWOCN  [facility]  [place]  [phone]

## 2021-06-16 PROBLEM — Z78.9 DNI (DO NOT INTUBATE): Chronic | Status: ACTIVE | Noted: 2017-10-17

## 2021-06-16 PROBLEM — I50.33 ACUTE ON CHRONIC DIASTOLIC (CONGESTIVE) HEART FAILURE (H): Status: ACTIVE | Noted: 2020-01-01

## 2021-06-16 PROBLEM — I82.412 ACUTE DEEP VEIN THROMBOSIS (DVT) OF LEFT FEMORAL VEIN (H): Status: ACTIVE | Noted: 2020-01-01

## 2021-06-16 PROBLEM — D69.9 BLEEDING DIATHESIS (H): Status: ACTIVE | Noted: 2020-01-01

## 2021-06-16 PROBLEM — I87.2 VENOUS INSUFFICIENCY: Status: ACTIVE | Noted: 2017-08-14

## 2021-06-16 PROBLEM — F03.90 DEMENTIA (H): Status: ACTIVE | Noted: 2018-12-11

## 2021-06-16 PROBLEM — Z66 DNR (DO NOT RESUSCITATE): Chronic | Status: ACTIVE | Noted: 2017-10-17

## 2021-06-16 PROBLEM — R13.12 OROPHARYNGEAL DYSPHAGIA: Status: ACTIVE | Noted: 2020-01-01

## 2021-06-16 PROBLEM — Z66 PHYSICIAN ORDERS FOR LIFE-SUSTAINING TREATMENT (POLST) FORM INDICATES PATIENT WISH FOR DO-NOT-RESUSCITATE STATUS: Status: ACTIVE | Noted: 2020-01-01

## 2021-06-16 NOTE — TELEPHONE ENCOUNTER
Telephone Encounter by Krissy Grijalva RN at 3/10/2020 12:49 PM     Author: Krissy Grijalva RN Service: -- Author Type: Registered Nurse    Filed: 3/10/2020 12:52 PM Encounter Date: 3/10/2020 Status: Attested    : Krissy Grijalva RN (Registered Nurse) Cosigner: Maira Garduno FNP at 3/10/2020  3:49 PM    Attestation signed by Maira Garduno FNP at 3/10/2020  3:49 PM    Agree with plan                 ANTICOAGULATION  MANAGEMENT- Home Care/Care Facility Result    Assessment     Today's INR result of 4.7 is Supratherapeutic (goal INR of 2.0-3.0)        Warfarin taken as previously instructed    Appetite is improving but still not 100%. She is consistently having a Boost/Ensure drink daily    No new medication/supplements affecting INR    Continues to tolerate warfarin with no reported s/s of thromboembolism     Bruising on leg is not new and it is improving    Previous INR was Therapeutic    Plan:     Spoke with Monique NUNEZ discussed INR result and instructed:     Warfarin Dosing Instructions: Hold warfarin x 2, Tue 3/10 and Wed 3/11 then change warfarin dose to     2 mg every Mon, Thu; 1 mg all other days         Next INR to be drawn: Mon 3/16 at [facility]. Home care will continue for another 1-2 weeks, please call Monique NUNEZ ([phone]) with the INR results on 3/16 to update on plan and order the next recheck date with home care    Education provided: importance of consistent vitamin K intake, target INR goal and significance of current INR result, monitoring for bleeding signs and symptoms and when to seek medical attention/emergency care    Monique verbalizes understanding and agrees to warfarin dosing plan.   ?   Krissy Grijalva RN    Subjective/Objective:      Lana Lantigua, a 89 y.o. female is established on warfarin.     Home care/care facility RN's report of Lana INR, recent warfarin dosing, diet changes, medication changes, and symptoms is documented below.    Additional findings:  diet change: confirmed that diet is improving but not great, patient does take an Ensure/Boost drink daily    Anticoagulation Episode Summary     Current INR goal:   2.0-3.0   TTR:   48.3 % (1.3 mo)   Next INR check:   3/16/2020   INR from last check:   4.70! (3/10/2020)   Weekly max warfarin dose:      Target end date:      INR check location:      Preferred lab:      [facility]    Indications    Atrial fibrillation with rapid ventricular response (H) (Resolved) [I48.91]  Acute deep vein thrombosis (DVT) of left femoral vein (H) [I82.412]           Comments:            Anticoagulation Care Providers     Provider Role Specialty Phone number    George Park MD  Internal Medicine [phone]

## 2021-06-16 NOTE — TELEPHONE ENCOUNTER
Telephone Encounter by Christiano Srivastava RN at 3/3/2020 11:45 AM     Author: Christiano Srivastava RN Service: -- Author Type: Registered Nurse    Filed: 3/3/2020 11:53 AM Encounter Date: 3/3/2020 Status: Attested    : Christiano Srivastava RN (Registered Nurse) Cosigner: George Park MD at 3/3/2020 11:57 AM    Attestation signed by George Park MD at 3/3/2020 11:57 AM    Anticoagulation care reviewed.  I agree with the plan of care.    George Park MD  [facility]  3/3/2020, 11:57 AM                 ANTICOAGULATION  MANAGEMENT- Home Care/Care Facility Result    Assessment     Today's INR result of 5.0 is Supratherapeutic (goal INR of 2.0-3.0)        Warfarin taken as previously instructed    No new diet changes affecting INR lower appetite, does use nutrition drinks    No new medication/supplements affecting INR    Continues to tolerate warfarin with no reported s/s of bleeding or thromboembolism     Previous INR was Therapeutic    Plan:     Spoke with home care nurse Monique discussed INR result and instructed:   Warfarin Dosing Instructions:  Skip today and tomorrow, inr thu    Next INR to be drawn: thurs with home care      Monique verbalizes understanding and agrees to warfarin dosing plan.   ?   Christiano Srivastava RN    Subjective/Objective:      Lana Lantigua, a 89 y.o. female is established on warfarin.     Home care/care facility RN's report of Lana INR, recent warfarin dosing, diet changes, medication changes, and symptoms is documented below.    Additional findings: none    Anticoagulation Episode Summary     Current INR goal:   2.0-3.0   TTR:   56.6 % (1.1 mo)   Next INR check:   3/5/2020   INR from last check:   5.00! (3/3/2020)   Weekly max warfarin dose:      Target end date:      INR check location:      Preferred lab:      Send INR reminders to:   [facility]    Indications    Atrial fibrillation with rapid ventricular response (H) (Resolved) [I48.91]  Acute deep vein  thrombosis (DVT) of left femoral vein (H) [I82.412]           Comments:            Anticoagulation Care Providers     Provider Role Specialty Phone number    George Park MD  Internal Medicine [phone]

## 2021-06-16 NOTE — TELEPHONE ENCOUNTER
Telephone Encounter by Carmen Crespo RN at 2/18/2020  1:07 PM     Author: Carmen Crespo RN Service: -- Author Type: Registered Nurse    Filed: 2/18/2020  1:08 PM Encounter Date: 2/18/2020 Status: Attested    : Carmen Crespo RN (Registered Nurse) Cosigner: George Park MD at 2/18/2020  1:14 PM    Attestation signed by George Park MD at 2/18/2020  1:14 PM    Anticoagulation care reviewed.  I agree with the plan of care.    George Park MD  [facility]  2/18/2020, 1:14 PM                 ANTICOAGULATION  MANAGEMENT- Home Care/Care Facility Result    Assessment     Today's INR result of 2.0 is Therapeutic (goal INR of 2.0-3.0)        Warfarin taken as previously instructed    No new diet changes affecting INR    No new medication/supplements affecting INR    Continues to tolerate warfarin with no reported s/s of bleeding or thromboembolism     Previous INR was Therapeutic    Plan:     Spoke with Monique home care nurse discussed INR result and instructed:     Warfarin Dosing Instructions: Continue current warfarin dose 2 mg daily  (0 % change)    Next INR to be drawn: 1 week    Education provided: target INR goal and significance of current INR result    Monique verbalizes understanding and agrees to warfarin dosing plan.   ?   Carmen Crespo RN    Subjective/Objective:      Lana Lantigua, a 89 y.o. female is established on warfarin.     Home care/care facility RN's report of Lana INR, recent warfarin dosing, diet changes, medication changes, and symptoms is documented below.    Additional findings: none    Anticoagulation Episode Summary     Current INR goal:   2.0-3.0   TTR:   61.7 % (2.6 wk)   Next INR check:   2/25/2020   INR from last check:   2.00 (2/18/2020)   Weekly max warfarin dose:      Target end date:      INR check location:      Preferred lab:      Send INR reminders to:   [facility]    Indications    Atrial fibrillation with rapid  ventricular response (H) (Resolved) [I48.91]  Acute deep vein thrombosis (DVT) of left femoral vein (H) [I82.412]           Comments:            Anticoagulation Care Providers     Provider Role Specialty Phone number    George Park MD  Internal Medicine [phone]

## 2021-06-16 NOTE — TELEPHONE ENCOUNTER
Telephone Encounter by David Aguilar RN at 2/10/2020 11:44 AM     Author: David Aguilar RN Service: -- Author Type: RN, Care Manager    Filed: 2/10/2020 11:46 AM Encounter Date: 2/10/2020 Status: Attested    : David Aguilar RN (RN, Care Manager) Cosigner: George Park MD at 2/10/2020 12:11 PM    Attestation signed by George Park MD at 2/10/2020 12:11 PM    Anticoagulation care reviewed.  I agree with the plan of care.    George Park MD  [facility]  2/10/2020, 12:11 PM                 ANTICOAGULATION  MANAGEMENT- Home Care/Care Facility Result    Assessment     Today's INR result of 1.9 is Subtherapeutic (goal INR of 2.0-3.0)        More warfarin taken than instructed which may be affecting INR    No new diet changes affecting INR    Discontinued Tamiflu     Discharged home from TCU 2/7/20.     Confirmed pt was sent home with 2 mg warfarin tablets.     Continues to tolerate warfarin with no reported s/s of bleeding or thromboembolism     Previous INR was Therapeutic    Plan:     Spoke with home care nurse Monique discussed INR result and instructed:     Warfarin Dosing Instructions: Change warfarin dose to 2 mg daily.    Next INR to be drawn: Thurs 2/13.     Education provided: importance of taking warfarin as instructed    Monique verbalizes understanding and agrees to warfarin dosing plan.   ?   David Aguilar RN    Subjective/Objective:      Lana GREEN Anatoliyal, a 89 y.o. female is established on warfarin.     Home care/care facility RN's report of Lana INR, recent warfarin dosing, diet changes, medication changes, and symptoms is documented below.    Additional findings: verbally confirmed home dose with Monique and updated on anticoagulation calendar    Anticoagulation Episode Summary     Current INR goal:   2.0-3.0   TTR:   35.4 % (1.4 wk)   Next INR check:   2/13/2020   INR from last check:   1.90! (2/10/2020)   Weekly max warfarin dose:       Target end date:      INR check location:      Preferred lab:      Send INR reminders to:   [facility]    Indications    Atrial fibrillation with rapid ventricular response (H) [I48.91]  Acute deep vein thrombosis (DVT) of left femoral vein (H) [I82.412]           Comments:            Anticoagulation Care Providers     Provider Role Specialty Phone number    George Park MD  Internal Medicine [phone]

## 2021-06-16 NOTE — TELEPHONE ENCOUNTER
Telephone Encounter by Alina Mcmanus RN at 2/13/2020 10:18 AM     Author: Alina Mcmanus RN Service: -- Author Type: Registered Nurse    Filed: 2/13/2020 10:21 AM Encounter Date: 2/13/2020 Status: Attested    : Alina Mcmanus RN (Registered Nurse) Cosigner: George Park MD at 2/13/2020 10:42 AM    Attestation signed by George Park MD at 2/13/2020 10:42 AM    Anticoagulation care reviewed.  I agree with the plan of care.    George Park MD  [facility]  2/13/2020, 10:42 AM                 ANTICOAGULATION  MANAGEMENT- Home Care/Care Facility Result    Assessment     Today's INR result of 2.6 is Therapeutic (goal INR of 2.0-3.0)        Warfarin taken as previously instructed    No new diet changes affecting INR    No new medication/supplements affecting INR    Continues to tolerate warfarin with no reported s/s of bleeding or thromboembolism     Previous INR was Subtherapeutic    Plan:     Spoke with [facility] Nurse Monique SULLIVAN discussed INR result and instructed:     Warfarin Dosing Instructions: Continue current warfarin dose 2 mg daily   (0 % change)    Next INR to be drawn: 2/18    Education provided: importance of therapeutic range    Monique verbalizes understanding and agrees to warfarin dosing plan.   ?   Alina Mcmanus RN    Subjective/Objective:      Lana Lantigua, a 89 y.o. female is established on warfarin.     Home care/care facility RN's report of Lana INR, recent warfarin dosing, diet changes, medication changes, and symptoms is documented below.    Additional findings: none    Anticoagulation Episode Summary     Current INR goal:   2.0-3.0   TTR:   47.0 % (1.9 wk)   Next INR check:   2/18/2020   INR from last check:   2.60 (2/13/2020)   Weekly max warfarin dose:      Target end date:      INR check location:      Preferred lab:      Send INR reminders to:   [facility]    Indications    Atrial fibrillation with rapid  ventricular response (H) [I48.91]  Acute deep vein thrombosis (DVT) of left femoral vein (H) [I82.412]           Comments:            Anticoagulation Care Providers     Provider Role Specialty Phone number    George Park MD  Internal Medicine [phone]

## 2021-06-16 NOTE — TELEPHONE ENCOUNTER
Telephone Encounter by Christiano Srivastava RN at 3/17/2020  2:01 PM     Author: Christiano Srivastava RN Service: -- Author Type: Registered Nurse    Filed: 3/17/2020  2:11 PM Encounter Date: 3/17/2020 Status: Attested    : Christiano Srivastava RN (Registered Nurse) Cosigner: George Park MD at 3/17/2020  2:26 PM    Attestation signed by George Park MD at 3/17/2020  2:26 PM    Anticoagulation care reviewed.  I agree with the plan of care.    George Park MD  [facility]  3/17/2020, 2:26 PM                 ANTICOAGULATION  MANAGEMENT- Home Care/Care Facility Result    Assessment     Today's INR result of 1.8 is Subtherapeutic (goal INR of 2.0-3.0)        Missed dose(s) may be affecting INR    No new diet changes affecting INR    No new medication/supplements affecting INR    Continues to tolerate warfarin with no reported s/s of bleeding or thromboembolism     Previous INR was Supratherapeutic       Plan:     Spoke with nurse Amaya discussed INR result and instructed:     Warfarin Dosing Instructions: Continue current warfarin dose 2 mg daily on mon/thu; and 1 mg daily rest of week  (0 % change)    Next INR to be drawn: one week with home care      Thora verbalizes understanding and agrees to warfarin dosing plan.   ?   Christiano Srivastava RN    Subjective/Objective:      Lana Lantigua, a 89 y.o. female is established on warfarin.     Home care/care facility RN's report of Lana INR, recent warfarin dosing, diet changes, medication changes, and symptoms is documented below.    Additional findings: none    Anticoagulation Episode Summary     Current INR goal:   2.0-3.0   TTR:   46.2 % (1.5 mo)   Next INR check:   3/24/2020   INR from last check:   1.80! (3/17/2020)   Weekly max warfarin dose:      Target end date:      INR check location:      Preferred lab:      Send INR reminders to:   [facility]    Indications    Atrial fibrillation with rapid ventricular response (H)  (Resolved) [I48.91]  Acute deep vein thrombosis (DVT) of left femoral vein (H) [I82.412]           Comments:            Anticoagulation Care Providers     Provider Role Specialty Phone number    George Park MD  Internal Medicine [phone]

## 2021-06-16 NOTE — TELEPHONE ENCOUNTER
Telephone Encounter by Carmen Crespo RN at 2/25/2020 12:02 PM     Author: Carmen Crespo RN Service: -- Author Type: Registered Nurse    Filed: 2/25/2020 12:03 PM Encounter Date: 2/25/2020 Status: Attested    : Carmen Crespo RN (Registered Nurse) Cosigner: George Park MD at 2/25/2020  1:15 PM    Attestation signed by George Park MD at 2/25/2020  1:15 PM    Anticoagulation care reviewed.  I agree with the plan of care.    George Park MD  [facility]  2/25/2020, 1:15 PM                 ANTICOAGULATION  MANAGEMENT- Home Care/Care Facility Result    Assessment     Today's INR result of 3.0 is Therapeutic (goal INR of 2.0-3.0)        Warfarin taken as previously instructed    No new diet changes affecting INR    No new medication/supplements affecting INR    Continues to tolerate warfarin with no reported s/s of bleeding or thromboembolism     Previous INR was Therapeutic    Plan:     Spoke with Monique home care nurse discussed INR result and instructed:     Warfarin Dosing Instructions: Continue current warfarin dose 2 mg daily  (0 % change)    Next INR to be drawn: 1 week    Education provided: importance of consistent vitamin K intake, impact of vitamin K foods on INR, vitamin K content of foods and target INR goal and significance of current INR result    Monique verbalizes understanding and agrees to warfarin dosing plan.   ?   Carmen Crespo RN    Subjective/Objective:      Lana Lantigua, a 89 y.o. female is established on warfarin.     Home care/care facility RN's report of Lana INR, recent warfarin dosing, diet changes, medication changes, and symptoms is documented below.    Additional findings: diet change: pt does not eat salads, has been eating canned vegetables, ACN Encouraged to eat more green vegetables    Anticoagulation Episode Summary     Current INR goal:   2.0-3.0   TTR:   72.5 % (3.6 wk)   Next INR check:   3/3/2020   INR from last  check:   3.00 (2/25/2020)   Weekly max warfarin dose:      Target end date:      INR check location:      Preferred lab:      Send INR reminders to:   [facility]    Indications    Atrial fibrillation with rapid ventricular response (H) (Resolved) [I48.91]  Acute deep vein thrombosis (DVT) of left femoral vein (H) [I82.412]           Comments:            Anticoagulation Care Providers     Provider Role Specialty Phone number    George Park MD  Internal Medicine [phone]

## 2021-06-16 NOTE — PROGRESS NOTES
BP Readings from Last 20 Encounters:   02/22/18 160/66   10/17/17 136/78   08/14/17 150/78   07/26/17 116/68   07/12/17 118/76   07/05/17 142/74   06/26/17 136/76   06/18/17 (!) 185/84   06/23/16 144/60   05/04/15 146/40

## 2021-06-16 NOTE — PROGRESS NOTES
Wt Readings from Last 20 Encounters:   02/22/18 (!) 91 lb 6.4 oz (41.5 kg)   10/17/17 (!) 89 lb (40.4 kg)   08/14/17 (!) 90 lb 9.6 oz (41.1 kg)   06/26/17 (!) 96 lb (43.5 kg)   06/18/17 (!) 90 lb (40.8 kg)   06/23/16 (!) 88 lb 3.2 oz (40 kg)   05/04/15 (!) 88 lb (39.9 kg)

## 2021-06-16 NOTE — TELEPHONE ENCOUNTER
Telephone Encounter by David Aguilar RN at 3/5/2020  1:43 PM     Author: David Aguilar RN Service: -- Author Type: RN, Care Manager    Filed: 3/5/2020  1:45 PM Encounter Date: 3/5/2020 Status: Attested    : David Aguilar RN (RN, Care Manager)    Related Notes: Original Note by David Aguilar RN (RN, Care Manager) filed at 3/5/2020  1:45 PM    Cosigner: George Park MD at 3/5/2020  2:12 PM    Attestation signed by George Park MD at 3/5/2020  2:12 PM    Anticoagulation care reviewed.  I agree with the plan of care.    George Park MD  [facility]  3/5/2020, 2:12 PM                 ANTICOAGULATION  MANAGEMENT- Home Care/Care Facility Result    Assessment     Today's INR result of 2.8 is Therapeutic (goal INR of 2.0-3.0)        Warfarin held as instructed.    Appetite has improved since two days ago.    No new medication/supplements affecting INR    Continues to tolerate warfarin with no reported s/s of bleeding or thromboembolism     Previous INR was Supratherapeutic     Last day of home care will be 3/13.     Plan:     Spoke with home care nurse Monique discussed INR result and instructed:     Warfarin Dosing Instructions: Continue current warfarin dose 2 mg daily.    Next INR to be drawn: Tues 3/10.    Education provided: importance of taking warfarin as instructed    Monique verbalizes understanding and agrees to warfarin dosing plan.   ?   David Aguilar RN    Subjective/Objective:      Lana Lantigua, a 89 y.o. female is established on warfarin.     Home care/care facility RN's report of Lana INR, recent warfarin dosing, diet changes, medication changes, and symptoms is documented below.    Additional findings: verbally confirmed home dose with Monique and updated on anticoagulation calendar    Anticoagulation Episode Summary     Current INR goal:   2.0-3.0   TTR:   53.8 % (1.1 mo)   Next INR check:   3/10/2020   INR from last check:    2.80 (3/5/2020)   Weekly max warfarin dose:      Target end date:      INR check location:      Preferred lab:      [facility]    Indications    Atrial fibrillation with rapid ventricular response (H) (Resolved) [I48.91]  Acute deep vein thrombosis (DVT) of left femoral vein (H) [I82.412]           Comments:            Anticoagulation Care Providers     Provider Role Specialty Phone number    George Park MD  Internal Medicine [phone]

## 2021-06-17 NOTE — TELEPHONE ENCOUNTER
Telephone Encounter by David Aguilar RN at 4/24/2020  1:13 PM     Author: David Aguilar RN Service: -- Author Type: RN, Care Manager    Filed: 4/24/2020  1:14 PM Encounter Date: 3/27/2020 Status: Attested    : David Aguilar RN (RN, Care Manager) Cosigner: George Park MD at 4/24/2020  1:20 PM    Attestation signed by George Park MD at 4/24/2020  1:20 PM    Anticoagulation care reviewed.  I agree with the plan of care.    George Park MD  [facility]  4/24/2020, 1:20 PM                 ANTICOAGULATION  MANAGEMENT PROGRAM    Lana Lantigua is being discharged from the [facility] ([facility]).    Reason for discharge: warfarin therapy completed. Pt is on hospice.     ACM referral closed, anticoagulation episode resolved and INR standing order discontinued.     If Lana needs warfarin management in the future, please send a new referral.    David Aguilar RN

## 2021-06-18 NOTE — PROGRESS NOTES
Assessment:       Bruising      Plan:       1. Provided reassurance  2. Discussed signs of worsening symptom and when to follow-up with PCP if no symptom improvement.      Patient Instructions   Reason to return for re-evaluation:  - Fever of 100.4 or higher  - Spreading redness, swelling, tenderness or discharge  - Lightheaded or developing frequent falls      Subjective:       Lana Lantigau is a 89 y.o. female here for evaluation of a leg bruise. Onset was 2 days ago. Patient is unaware of a cause. Does not recall any physical trauma. Patient is not on blood thinners. THere is no family history of abnormal bleeding or bleeding disorders. Patient had a similar episode that occurred 2 years ago and improved on its own. She is still able to weight bear and denies any leg pain or tenderness. No fevers, lightheadedness, or fatigue.    The following portions of the patient's history were reviewed and updated as appropriate: allergies, current medications and problem list.    Review of Systems  Pertinent items are noted in HPI.     Allergies  No Known Allergies      Objective:       /54 (Patient Site: Right Arm, Patient Position: Sitting, Cuff Size: Adult Small)  Pulse 63  Temp 98.1  F (36.7  C) (Oral)   Resp 20  Wt (!) 87 lb 3.2 oz (39.6 kg)  SpO2 98%  BMI 17.61 kg/m2  General appearance: alert, appears stated age, cooperative, no distress and non-toxic  Head: Normocephalic, without obvious abnormality, atraumatic  Extremities: right lower extremity with ecchymosis, no other trauma, FROM of all leg joints, no cyanosis, no edema  Pulses: 2+ and symmetric  Skin: Right lower extremity: lateral calf ecchymosis with no tenderness, swelling, or increased warmth. Skin is soft with no palpable abscess. Well healing 0.5 cm abrasion at the lateral calf.   Neurologic: sensation to light touch intact.

## 2021-06-19 NOTE — LETTER
Letter by George Park MD at      Author: George Park MD Service: -- Author Type: --    Filed:  Encounter Date: 5/28/2019 Status: (Other)         5/28/2019    Lana Lantigua   [address]  [place] [address]         Dear Lana,    It was good to see you in clinic.  I hope your questions were answered at the time of your visit.    The results of your tests from your visit were as follows:    Resulted Orders   Basic Metabolic Panel   Result Value Ref Range    Sodium 146 (H) 136 - 145 mmol/L    Potassium 4.2 3.5 - 5.0 mmol/L    Chloride 110 (H) 98 - 107 mmol/L    CO2 28 22 - 31 mmol/L    Anion Gap, Calculation 8 5 - 18 mmol/L    Glucose 95 70 - 125 mg/dL    Calcium 9.7 8.5 - 10.5 mg/dL    BUN 17 8 - 28 mg/dL    Creatinine 0.82 0.60 - 1.10 mg/dL    GFR MDRD Af Amer >60 >60 mL/min/1.73m2    GFR MDRD Non Af Amer >60 >60 mL/min/1.73m2    Narrative    Fasting Glucose reference range is 70-99 mg/dL per  American Diabetes Association (ADA) guidelines.   HM2(CBC w/o Differential)   Result Value Ref Range    WBC 6.1 4.0 - 11.0 thou/uL    RBC 3.77 (L) 3.80 - 5.40 mill/uL    Hemoglobin 12.0 12.0 - 16.0 g/dL    Hematocrit 35.9 35.0 - 47.0 %    MCV 95 80 - 100 fL    MCH 31.8 27.0 - 34.0 pg    MCHC 33.4 32.0 - 36.0 g/dL    RDW 10.7 (L) 11.0 - 14.5 %    Platelets 239 140 - 440 thou/uL    MPV 8.6 7.0 - 10.0 fL     Your blood counts are normal and you are not anemic.     Your kidney tests were normal.  There is no signs of diabetes in your blood work.     Your sodium was slightly high.  This can be due to not drinking enough water.    I do want you to focus on drinking ideally 1-2 more glasses of water a day to keep hydrated and keep the sodium level lower.    Please follow up again in 6 months.    If you have any questions regarding these results, please feel free to contact me at [phone].  I wish you the best of health!        Sincerely,     George Park MD  General Internal Medicine  [facility]  [place]

## 2021-06-20 NOTE — PROGRESS NOTES
Chief Complaint   Patient presents with     Toe Pain     Poss ingrown toenail, left big toe        HPI    Several days of redness and swelling with pain around left great toe. No injury, pus drainage, bleeding, fever.    ROS: Pertinent ROS noted in HPI.     No Known Allergies    Patient Active Problem List   Diagnosis     Gastric carcinoma (H)     White coat hypertension     Osteoporosis     B12 deficiency     Nonsmoker     Venous insufficiency     DNR (do not resuscitate)     DNI (do not intubate)       Family History   Problem Relation Age of Onset     Heart failure Father      Lung cancer Sister      Hypertension Mother      Varicose Veins Mother      Cancer Son       choriocarcinoma       Social History     Social History     Marital status:      Spouse name: N/A     Number of children: 7     Years of education: N/A     Occupational History     Homemaker and nursing Retired     Social History Main Topics     Smoking status: Never Smoker     Smokeless tobacco: Never Used     Alcohol use No     Drug use: Not on file     Sexual activity: Not on file     Other Topics Concern     Not on file     Social History Narrative    Has resided in [place] in the past.  Generally has lived in [place], [place].  [place] in [place] at this time.        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     Objective:    Vitals:    10/08/18 1505   BP: 128/60   Pulse: 70   Resp: 18   Temp: 97.8  F (36.6  C)   SpO2: 99%       Gen:NAD  MSK: moderate erythema and swelling at lateral aspect of the left great toe,with ingrown nail. No purulence nor fluctuance. Full ROM of the affected toe.      Ingrown toenail of left foot with infection  -     cephalexin (KEFLEX) 250 MG capsule; Take 1 capsule (250 mg total) by mouth 4 (four) times a day for 10 days.      Warm soaks, and f/u with PCP if no improvement in a week.

## 2021-06-20 NOTE — LETTER
Letter by Carmen Crespo RN at      Author: Carmen Crespo RN Service: -- Author Type: --    Filed:  Encounter Date: 3/27/2020 Status: (Other)         Lana Lantigua  [address]  [place] [address]      April 16, 2020      Dear MsMarimar Lantigua,    You are currently under the care of [facility] for your warfarin (Coumadin ) therapy.  We are contacting you because our records show you were due for an INR on 3/24/20.    There are potentially serious risks when taking warfarin without careful monitoring and we want to make sure you are safely managed.  Routine INR monitoring is required for warfarin refills.     Please call [phone] as soon as possible to schedule an appointment.  If there has been a change in your care or other concerns, please let us know so we can help and or update our records.     Sincerely,       [facility]                normal...

## 2021-06-20 NOTE — LETTER
Letter by Aura Buck CNP at      Author: Aura Buck CNP Service: -- Author Type: --    Filed:  Encounter Date: 12/30/2019 Status: Signed         Patient: Lana Lantigua   MR Number: [id number]   YOB: 1926   Date of Visit: 12/30/2019     Code Status:  DNR  Visit Type: Follow Up (hospitalization following left MCA stroke, dementia)     Facility:  [facility] [[id number]]      Facility Type: SNF (Skilled Nursing Facility, TCU)    History of Present Illness:   Hospital Admission Date: 12/22/2019 Hospital Discharge Date: 12/27/2019      Lana Lantigua is a 89 y.o. female who has a past medical history for gastric cancer, hypertension, osteoporosis, vitamin B12 deficiency, Alzheimer's type dementia, and malnutrition.  She was recently hospitalized for right-sided weakness and aphasia and was found to have an acute left MCA ischemic stroke.  In the emergency room she did receive TPA and thrombectomy.  She was started on aspirin and atorvastatin and is to follow-up with neurology.  She does have right-sided hemiparesis with aphasia and was found to have dysphasia however her diet has been advanced and is currently on puréed diet.  She also developed atrial fibrillation with RVR and was started on metoprolol and apixaban.  She was found to have moderate aortic regurgitation and is to follow-up with cardiology.  Her A1c was 6.4% and she was started on metformin.    Today, she appears to be confused and nursing states this is where she has been the entire admission.  She is requesting to frequently go to the bathroom however by the time she is helped to the bathroom she forgets about her request.  She has been found ambulating on her own even with her right-sided hemiparesis.  She is a high falls risk and will likely need increased services at discharge.  I am unsure of her home situation and who she lives with.  I did make an appointment to meet with her son on Thursday of this week to  discuss discharge planning.      It was noted that she was on spironolactone at one time and this was discontinued in the hospital.  Her lower extremities have no signs and symptoms of edema.  Her fasting blood sugars are in good range at .  She denies any pain and is easily entertained with books and writing on paper.    Past Medical History:   Diagnosis Date   ? B12 deficiency    ? Dementia (H) 12/11/2018   ? DNI (do not intubate) 10/17/2017   ? DNR (do not resuscitate) 10/17/2017   ? Gastric carcinoma (H)  1994   ? Nonsmoker    ? Osteoporosis    ? Venous insufficiency 8/14/2017   ? White coat hypertension      Past Surgical History:   Procedure Laterality Date   ? APPENDECTOMY  1960   ? CATARACT EXTRACTION Bilateral    ? CHOLECYSTECTOMY  1960   ? HYSTERECTOMY  1960   ? IR CEREBRAL ANGIOGRAM  12/22/2019   ? TONSILLECTOMY  age 20    ? TOTAL GASTRECTOMY  12/01/1994     Family History   Problem Relation Age of Onset   ? Heart failure Father    ? Lung cancer Sister    ? Hypertension Mother    ? Varicose Veins Mother    ? Cancer Son          choriocarcinoma     Social History     Socioeconomic History   ? Marital status:      Spouse name: Not on file   ? Number of children: 7   ? Years of education: Not on file   ? Highest education level: Not on file   Occupational History   ? 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: RETIRED   Social Needs   ? Financial resource strain: Not on file   ? Food insecurity:     Worry: Not on file     Inability: Not on file   ? Transportation needs:     Medical: Not on file     Non-medical: Not on file   Tobacco Use   ? Smoking status: Never Smoker   ? Smokeless tobacco: Never Used   Substance and Sexual Activity   ? Alcohol use: No   ? Drug use: No   ? Sexual activity: Not on file   Lifestyle   ? Physical activity:     Days per week: Not on file     Minutes per session: Not on file   ? Stress: Not on file   Relationships   ? Social connections:     Talks on phone: Not  on file     Gets together: Not on file     Attends [employer] service: Not on file     Active member of club or organization: Not on file     Attends meetings of clubs or organizations: Not on file     Relationship status: Not on file   ? Intimate partner violence:     Fear of current or ex partner: Not on file     Emotionally abused: Not on file     Physically abused: Not on file     Forced sexual activity: Not on file   Other Topics Concern   ? Not on file   Social History Narrative    Has resided in [place] in the past.  Generally has lived in [place], [place].  [place] in [place] at this time.        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       Current Outpatient Medications   Medication Sig Dispense Refill   ? acetaminophen (TYLENOL) 500 MG tablet Take 1 tablet (500 mg total) by mouth every 6 (six) hours as needed for pain or fever.  0   ? apixaban (ELIQUIS) 2.5 mg Tab tablet Take 1 tablet (2.5 mg total) by mouth 2 (two) times a day. 60 tablet 0   ? aspirin 81 mg chewable tablet Chew 1 tablet (81 mg total) daily.  0   ? atorvastatin (LIPITOR) 40 MG tablet Take 1 tablet (40 mg total) by mouth at bedtime.  0   ? bisacodyl (DULCOLAX) 10 mg suppository Insert suppository rectally daily as needed for treatment of constipation.  0   ? metFORMIN (FORTAMET) 500 MG (OSM) 24 hr tablet Take 1 tablet (500 mg total) by mouth daily with breakfast.     ? metoprolol tartrate (LOPRESSOR) 50 MG tablet Take 1 tablet (50 mg total) by mouth 2 (two) times a day.  0   ? polyethylene glycol (MIRALAX) 17 gram packet Take 1 packet (17 g total) by mouth daily as needed.  0   ? senna-docusate (PERICOLACE) 8.6-50 mg tablet Take 1 tablet by mouth 2 (two) times a day as needed for constipation.  0   ? triamcinolone (KENALOG) 0.1 % cream Apply topically 2 (two) times a day. 454 g 2     Current Facility-Administered Medications   Medication Dose Route Frequency Provider Last Rate Last Dose   ? cyanocobalamin injection  1,000 mcg  1,000 mcg Intramuscular Q30 Days George Park MD   1,000 mcg at 12/05/19 1033     No Known Allergies  Immunization History   Administered Date(s) Administered   ? Influenza high dose,seasonal,PF, 65+ yrs 10/17/2017, 10/04/2019   ? Influenza, inj, historic,unspecified 09/10/2009, 12/08/2013, 10/08/2018   ? Pneumo Polysac 23-V 12/01/2004       Post Discharge Medication Reconciliation Status: discharge medications reconciled, continue medications without change    Review of Systems   Patient denies fever, chills, headache, lightheadedness, dizziness, rhinorrhea, cough, congestion, shortness of breath, chest pain, palpitations, abdominal pain, n/v, diarrhea, constipation, change in appetite, dysuria, frequency, burning or pain with urination.  Other than stated in HPI all other review of systems is negative.     Physical Exam   Vital signs: /85, heart rate 93, respiratory 18, temp 98.1, weight 101.8.  GENERAL APPEARANCE: Thin, elderly female in no acute distress.  HEENT: normocephalic, atraumatic  PERRL, sclerae anicteric, conjunctivae clear and moist, EOM intact  NECK: Supple and symmetric. Trachea is midline, no thyromegaly, no adenopathy, and no tenderness  LUNGS: Lung sounds CTA, no adventitious sounds, respiratory effort normal.  CARD: RRR, S1, S2, without murmurs, gallops, rubs,   ABD: Soft and nontender with normal bowel sounds.   MSK: Right-sided weakness lower extremity  EXTREMITIES: No cyanosis, clubbing or edema.  NEURO: Alert obviously confused.  Face is symmetric.  SKIN: Inspection of the skin reveals no rashes, ulcerations or petechiae.  PSYCH: euthymic            Labs:    Recent Results (from the past 240 hour(s))   INR   Result Value Ref Range    INR 1.45 (H) 0.90 - 1.10   APTT(PTT)   Result Value Ref Range    PTT 30 24 - 37 seconds   Basic Metabolic Panel   Result Value Ref Range    Sodium 142 136 - 145 mmol/L    Potassium 3.9 3.5 - 5.0 mmol/L    Chloride 110 (H) 98 - 107 mmol/L     CO2 22 22 - 31 mmol/L    Anion Gap, Calculation 10 5 - 18 mmol/L    Glucose 141 (H) 70 - 125 mg/dL    Calcium 8.4 (L) 8.5 - 10.5 mg/dL    BUN 14 8 - 28 mg/dL    Creatinine 0.83 0.60 - 1.10 mg/dL    GFR MDRD Af Amer >60 >60 mL/min/1.73m2    GFR MDRD Non Af Amer >60 >60 mL/min/1.73m2   HM2(CBC w/o Differential)   Result Value Ref Range    WBC 7.8 4.0 - 11.0 thou/uL    RBC 3.56 (L) 3.80 - 5.40 mill/uL    Hemoglobin 10.4 (L) 12.0 - 16.0 g/dL    Hematocrit 33.0 (L) 35.0 - 47.0 %    MCV 93 80 - 100 fL    MCH 29.2 27.0 - 34.0 pg    MCHC 31.5 (L) 32.0 - 36.0 g/dL    RDW 13.6 11.0 - 14.5 %    Platelets 235 140 - 440 thou/uL    MPV 10.3 8.5 - 12.5 fL   Troponin I   Result Value Ref Range    Troponin I 0.02 0.00 - 0.29 ng/mL   Hepatic Profile   Result Value Ref Range    Bilirubin, Total 0.7 0.0 - 1.0 mg/dL    Bilirubin, Direct 0.2 <=0.5 mg/dL    Protein, Total 5.8 (L) 6.0 - 8.0 g/dL    Albumin 3.3 (L) 3.5 - 5.0 g/dL    Alkaline Phosphatase 96 45 - 120 U/L    AST 36 0 - 40 U/L    ALT 26 0 - 45 U/L   POCT CREATININE   Result Value Ref Range    iSTAT Creatinine 0.7 0.6 - 1.1 mg/dL    iSTAT GFR MDRD Af Amer >60 >60 mL/min/1.73m2    iSTAT GFR MDRD Non Af Amer >60 >60 mL/min/1.73m2   ECG 12 lead nursing unit performed   Result Value Ref Range    SYSTOLIC BLOOD PRESSURE      DIASTOLIC BLOOD PRESSURE      VENTRICULAR RATE 130 BPM    ATRIAL RATE 127 BPM    P-R INTERVAL      QRS DURATION 96 ms    Q-T INTERVAL 358 ms    QTC CALCULATION (BEZET) 526 ms    P Axis      R AXIS 13 degrees    T AXIS 51 degrees    MUSE DIAGNOSIS       Atrial fibrillation with rapid ventricular response  Nonspecific T wave abnormality , probably digitalis effect  Abnormal ECG  When compared with ECG of 03-NOV-2018 19:53,  Atrial fibrillation has replaced Sinus rhythm  Vent. rate has increased BY  54 BPM  ST elevation now present in Anterior leads  Confirmed by SEE ED PROVIDER NOTE FOR, ECG INTERPRETATION ([id number]),  Ashleigh Sotelo ([id number]) on 12/23/2019  12:09:52  PM     POCT Glucose - when taking PO 4x daily AC and QHS   Result Value Ref Range    Glucose 149 (H) 70 - 139 mg/dL   POCT Glucose - when NPO every 6 hours   Result Value Ref Range    Glucose 146 (H) 70 - 139 mg/dL   BNP(B-type Natriuretic Peptide)   Result Value Ref Range     (H) 0 - 167 pg/mL   Lipid Profile   Result Value Ref Range    Triglycerides 53 <=149 mg/dL    Cholesterol 125 <=199 mg/dL    LDL Calculated 60 <=129 mg/dL    HDL Cholesterol 54 >=50 mg/dL    Patient Fasting > 8hrs? Yes    TSH   Result Value Ref Range    TSH 2.30 0.30 - 5.00 uIU/mL   Glycosylated Hemoglobin A1C   Result Value Ref Range    Hemoglobin A1c 6.4 (H) 4.2 - 6.1 %   Basic Metabolic Panel   Result Value Ref Range    Sodium 144 136 - 145 mmol/L    Potassium 3.7 3.5 - 5.0 mmol/L    Chloride 112 (H) 98 - 107 mmol/L    CO2 21 (L) 22 - 31 mmol/L    Anion Gap, Calculation 11 5 - 18 mmol/L    Glucose 112 70 - 125 mg/dL    Calcium 8.2 (L) 8.5 - 10.5 mg/dL    BUN 11 8 - 28 mg/dL    Creatinine 0.69 0.60 - 1.10 mg/dL    GFR MDRD Af Amer >60 >60 mL/min/1.73m2    GFR MDRD Non Af Amer >60 >60 mL/min/1.73m2   Magnesium   Result Value Ref Range    Magnesium 1.6 (L) 1.8 - 2.6 mg/dL   POCT Glucose - when NPO every 6 hours   Result Value Ref Range    Glucose 100 70 - 139 mg/dL   Echo Complete   Result Value Ref Range    LV volume diastolic 92 46 - 106 cm3    LV volume systolic 52 (!) 14 - 42 cm3    HR 80 bpm    IVSd 0.8 0.6 - 0.9 cm    LVIDd 4.3 3.8 - 5.2 cm    LVIDs 3.2 2.2 - 3.5 cm    LVOT diam 2 cm    LVOT mean gradient 1 mmHg    LVOT peak VTI 13.3 cm    LVOT mean abiel 48.5 cm/s    LVOT peak abiel 69.9 cm/s    LVOT peak gradient 2 mmHg    LV PWd 0.9 0.6 - 0.9 cm    MV E' lat abiel 6.85 cm/s    MV E' med abiel 6.27 cm/s    AR decel slope 2,790 mm/s2    AR p 1/2 time 465 ms    AR peak abiel 421 cm/s    AV peak abiel 135 cm/s    AV mean abiel 103 cm/s    AV mean gradient 5 mmHg    AV VTI 31 cm    AO root 3.3 cm    AO ascending 3.8 cm    LA size 3.6 cm     MV decel time 120 ms    MV peak E abiel 104 cm/s    OH peak abiel 72.7 cm/s    OH peak gradient 2 mmHg    TR peak abiel 254 cm/s    LA area 2 20.2 cm2    LA area 1 22.5 cm2    LA length 5.52 cm    BSA 1.41 m2    Hieght 63 in    Weight 1,700.19 lbs    /71 mmHg    IVS/PW ratio 0.9     LV FS 25.6 28 - 44 %    Echo LVEF calculated 43 55 - 75 %    LV mass 114.2 g    AV area 1.3 cm2    LVOT area 3.14 cm2    LVOT SV 41.8 cm3    LV systolic volume index 36.9 8 - 24 cm3/m2    LV diastolic volume index 65.2 29 - 61 cm3/m2    LV mass index 81.0 g/m2    LV SVi 29.6 ml/m2    LV CO 3.3 l/min    LV Ci 2.4 l/min/m2    Height 63.0 in    Weight 106 lbs    AV DIM IND VTI 0.4     TR peak gradent 25.8 mmHg    LA volume 70.0 mL    AV DIM IND abiel 0.5     AV peak gradient 7.3 mmHg    LA volume index 49.6 mL/m2    MV med E/e' ratio 16.6     MV lat E/e' ratio 15.2     MV Avg E/e' Ratio 15.9 cm/s    AR peak gradient 70.9 mmHg    Echo LVEF Estimated 50 %   POCT Glucose - when taking PO 4x daily AC and QHS   Result Value Ref Range    Glucose 115 70 - 139 mg/dL   POCT Glucose - when NPO every 6 hours   Result Value Ref Range    Glucose 140 (H) 70 - 139 mg/dL   Potassium - Next AM   Result Value Ref Range    Potassium 3.8 3.5 - 5.0 mmol/L   Magnesium   Result Value Ref Range    Magnesium 1.8 1.8 - 2.6 mg/dL   POCT Glucose - when taking PO 4x daily AC and QHS   Result Value Ref Range    Glucose 143 (H) 70 - 139 mg/dL   POCT Glucose - when taking PO 4x daily AC and QHS   Result Value Ref Range    Glucose 124 70 - 139 mg/dL   POCT Glucose - when taking PO 4x daily AC and QHS   Result Value Ref Range    Glucose 157 (H) 70 - 139 mg/dL   POCT Glucose - when taking PO 4x daily AC and QHS   Result Value Ref Range    Glucose 176 (H) 70 - 139 mg/dL   Magnesium   Result Value Ref Range    Magnesium 1.8 1.8 - 2.6 mg/dL   HM2(CBC w/o Differential)   Result Value Ref Range    WBC 10.2 4.0 - 11.0 thou/uL    RBC 2.99 (L) 3.80 - 5.40 mill/uL    Hemoglobin 8.5  (L) 12.0 - 16.0 g/dL    Hematocrit 27.3 (L) 35.0 - 47.0 %    MCV 91 80 - 100 fL    MCH 28.4 27.0 - 34.0 pg    MCHC 31.1 (L) 32.0 - 36.0 g/dL    RDW 14.1 11.0 - 14.5 %    Platelets 169 140 - 440 thou/uL    MPV 10.3 8.5 - 12.5 fL   Basic Metabolic Panel   Result Value Ref Range    Sodium 140 136 - 145 mmol/L    Potassium 3.8 3.5 - 5.0 mmol/L    Chloride 110 (H) 98 - 107 mmol/L    CO2 23 22 - 31 mmol/L    Anion Gap, Calculation 7 5 - 18 mmol/L    Glucose 113 70 - 125 mg/dL    Calcium 7.9 (L) 8.5 - 10.5 mg/dL    BUN 24 8 - 28 mg/dL    Creatinine 0.98 0.60 - 1.10 mg/dL    GFR MDRD Af Amer >60 >60 mL/min/1.73m2    GFR MDRD Non Af Amer 53 (L) >60 mL/min/1.73m2   POCT Glucose - when taking PO 4x daily AC and QHS   Result Value Ref Range    Glucose 110 70 - 139 mg/dL   POCT Glucose   Result Value Ref Range    Glucose 205 (H) 70 - 139 mg/dL   POCT Glucose   Result Value Ref Range    Glucose 173 (H) 70 - 139 mg/dL   Magnesium   Result Value Ref Range    Magnesium 1.9 1.8 - 2.6 mg/dL   Potassium - Next AM   Result Value Ref Range    Potassium 3.6 3.5 - 5.0 mmol/L   POCT Glucose   Result Value Ref Range    Glucose 113 70 - 139 mg/dL   POCT Glucose   Result Value Ref Range    Glucose 196 (H) 70 - 139 mg/dL   POCT Glucose   Result Value Ref Range    Glucose 115 70 - 139 mg/dL   POCT Glucose   Result Value Ref Range    Glucose 212 (H) 70 - 139 mg/dL   Magnesium   Result Value Ref Range    Magnesium 1.8 1.8 - 2.6 mg/dL   Potassium - Next AM   Result Value Ref Range    Potassium 4.1 3.5 - 5.0 mmol/L   POCT Glucose   Result Value Ref Range    Glucose 101 70 - 139 mg/dL   POCT Glucose   Result Value Ref Range    Glucose 168 (H) 70 - 139 mg/dL         Assessment:  1. Ischemic cerebrovascular accident (CVA) (H) - 2018     2. Atrial fibrillation with rapid ventricular response (H)     3. Right hemiplegia (H)     4. Primary hypertension     5. Dementia without behavioral disturbance, unspecified dementia type (H)     6. B12 deficiency      7. Severe protein-calorie malnutrition ([place]: less than 60% of standard weight) (H)     8. Type 2 diabetes mellitus without complication, without long-term current use of insulin (H)         Plan:    CVA: Stable continue therapies will likely need increased supervision and care at discharge.  Continue atorvastatin and aspirin.    Atrial fibrillation: Rate stable at this time continue metoprolol and apixaban.    Right hemiplegia: Continue therapies this may improve and she will need ongoing assistance and supervision during ambulation.    Hypertension: Stable continue metoprolol    Dementia: Will likely need 24-hour supervision at discharge.    B12 deficiency currently getting IM B12 supplements which was not on her discharge orders will consider this in the future.  Continue to monitor hemoglobin.    Malnutrition: Oral supplements twice daily.    Diabetes: Continue metformin and monitoring blood sugars.  This is stable at this time.        Electronically signed by: Aura Buck CNP

## 2021-06-20 NOTE — LETTER
Letter by Johnson, Michael Duane, CNP at      Author: Johnson, Michael Duane, CNP Service: -- Author Type: --    Filed:  Encounter Date: 2/7/2020 Status: (Other)         Patient: Lana Lantigua   MR Number: [id number]   YOB: 1926   Date of Visit: 2/7/2020     [facility]    Facility:   [facility] [[id number]]   Code Status: DNR  PCP: George Park MD   Phone: [phone]   Fax: [phone]      CHIEF COMPLAINT/REASON FOR VISIT:  Chief Complaint   Patient presents with   ? Discharge Summary       HISTORY COURSE:  Lana is a 89 y.o. female who I had the opportunity to revisit with secondary to her hospitalization most recently January 24through January 29, 2020 secondary to dyspnea and hypoxia.  She was diagnosed with acute on chronic diastolic congestive heart failure with a BNP of 1202 which was up from 692 from December 22, 2019.  The CAT scan did show bilateral pleural effusions of lower lobes.  The lower extremity edema did worsen.  She was hypoxic with 88% on room air.  Symptoms also seem to worsen after she was given fluids per sepsis protocol.  She was diuresed with furosemide.  Regarding her atrial fibrillation she did have a diltiazem bolus and continue with warfarin.  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  They can continue with the atorvastatin and aspirin.  She does have a history of dysphasia and I did request speech therapy to evaluate.  Has a history of recent left leg DVT.  The goals of care pretty much comfort.  With a history of hyponatremia the sodium level remained at 147.  Prior to that she was hospitalizedJanuary 6, 2020 through January 10, 2020 secondary to left lower extremity swelling and erythema.  The ultrasound left lower extremity on January 6 did show a DVT from the popliteal to the femoral vein.  She has been on Eliquis for atrial fibrillation switch to heparin in the setting of a new DVT  was bridged to warfarin.  Regarding her atrial fibrillation.  Metoprolol was increased 75 mg twice daily rather than 50 mg twice daily.  She also did receive furosemide 40 mg x 2.  She was evaluated by dietitian secondary to calorie malnutrition.  Her sodium was elevated at 147 did resolve at discharge.  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  She also does have normocytic anemia likely of chronic disease.  She has been able to participate with rehabilitation services unfortunately in the services was limited due to cognition as well as generalized weakness.  She has been working with speech therapy as well regarding swallowing and to ensure that there were no aspiration issues.  She does have B12 deficiency there is a B12 ordered for today the results not yet back by this dictation see results below her most recent laboratory studies.  Her albumin was at 2.5 and is getting extra nutrient supplements.  She has been normotensive and afebrile and also on room air.  Not requiring any nebs or other pulmonary intervention.  For sleep she is on both melatonin and trazodone which has been helpful.  She is also on warfarin which is being managed by the Coumadin clinic.  She is also on Tamiflu prophylactically 75 mg daily and the last dose will be February 16.  Review of Systems  Currently no reports of fever chills fatigue cough or cold flulike symptoms nausea vomiting diarrhea dysuria flulike symptoms headache or stiff neck.  History of A. fib left MCA CVA gastric cancer heart failure, dementia, recent left leg DVT.     There were no vitals filed for this visit.  Blood pressure 117/72 pulse 95 temperature 98.1 saturation room air 96% weight 95 pounds her weight on January 31 was 99 pounds  Physical Exam   Head is normocephalic.  Neck is supple without adenopathy lungs are clear throughout.  Cardiovascular does have A. fib along with 3/6 MAGDIEL.  no significant edema.  Musculoskeletal  generalized weakness.   Denies pain.  Psychiatric Pleasant but does have cognitive impairment. Aphasia.   Lab Results   Component Value Date    WBC 8.8 01/31/2020    HGB 9.0 (L) 01/31/2020    HCT 30.5 (L) 01/31/2020    MCV 86 01/31/2020     01/31/2020     Results for orders placed or performed in visit on 01/31/20   Basic Metabolic Panel   Result Value Ref Range    Sodium 141 136 - 145 mmol/L    Potassium 3.6 3.5 - 5.0 mmol/L    Chloride 103 98 - 107 mmol/L    CO2 30 22 - 31 mmol/L    Anion Gap, Calculation 8 5 - 18 mmol/L    Glucose 77 70 - 125 mg/dL    Calcium 8.1 (L) 8.5 - 10.5 mg/dL    BUN 18 8 - 28 mg/dL    Creatinine 0.66 0.60 - 1.10 mg/dL    GFR MDRD Af Amer >60 >60 mL/min/1.73m2    GFR MDRD Non Af Amer >60 >60 mL/min/1.73m2       Lab Results   Component Value Date    UBRENTNJ98 613 11/29/2019     Lab Results   Component Value Date    IRON 94 06/04/2018    FERRITIN 25 06/04/2018     Lab Results   Component Value Date    ALBUMIN 2.5 (L) 01/31/2020     Results for orders placed or performed in visit on 01/31/20   Basic Metabolic Panel   Result Value Ref Range    Sodium 141 136 - 145 mmol/L    Potassium 3.6 3.5 - 5.0 mmol/L    Chloride 103 98 - 107 mmol/L    CO2 30 22 - 31 mmol/L    Anion Gap, Calculation 8 5 - 18 mmol/L    Glucose 77 70 - 125 mg/dL    Calcium 8.1 (L) 8.5 - 10.5 mg/dL    BUN 18 8 - 28 mg/dL    Creatinine 0.66 0.60 - 1.10 mg/dL    GFR MDRD Af Amer >60 >60 mL/min/1.73m2    GFR MDRD Non Af Amer >60 >60 mL/min/1.73m2         MEDICATION LIST:  Current Outpatient Medications   Medication Sig   ? acetaminophen (TYLENOL) 500 MG tablet Take 1 tablet (500 mg total) by mouth every 6 (six) hours as needed for pain or fever.   ? aspirin 81 mg chewable tablet Chew 1 tablet (81 mg total) daily.   ? atorvastatin (LIPITOR) 40 MG tablet Take 1 tablet (40 mg total) by mouth at bedtime.   ? cyanocobalamin 1,000 mcg/mL injection Inject 1,000 mcg into the shoulder, thigh, or buttocks every 30 (thirty) days.    ? furosemide (LASIX) 20 MG tablet Take 1 tablet (20 mg total) by mouth daily.   ? melatonin 3 mg Tab tablet Take 3 mg by mouth at bedtime.   ? metoprolol tartrate (LOPRESSOR) 50 MG tablet Take 50 mg by mouth 2 (two) times a day.   ? traZODone (DESYREL) 50 MG tablet Take 25 mg by mouth at bedtime.   ? triamcinolone (KENALOG) 0.1 % cream Apply topically 2 (two) times a day. (Patient taking differently: Apply topically 2 (two) times a day. Apply to left lower extremity for dermatitis.)   ? warfarin ANTICOAGULANT (COUMADIN/JANTOVEN) 1 MG tablet Take 0.5 tablets (0.5 mg total) by mouth daily. Adjust dose based on INR results.       DISCHARGE DIAGNOSIS:    ICD-10-CM    1. Acute cerebrovascular accident (H) I63.9    2. Aphasia R47.01    3. B12 deficiency E53.8    4. Dementia without behavioral disturbance, unspecified dementia type (H) F03.90        MEDICAL EQUIPMENT NEEDS:  None    DISCHARGE PLAN/FACE TO FACE:  I certify that services are/were furnished while this patient was under the care of a physician and that a physician or an allowed non-physician practitioner (NPP), had a face-to-face encounter that meets the physician face-to-face encounter requirements. The encounter was in whole, or in part, related to the primary reason for home health. The patient is confined to his/her home and needs intermittent skilled nursing, physical therapy, speech-language pathology, or the continued need for occupational therapy. A plan of care has been established by a physician and is periodically reviewed by a physician.  Date of Face-to-Face Encounter: February 7, 2020    I certify that, based on my findings, the following services are medically necessary home health services: She will be discharging to home with current medications with an anticipated discharge date of February 7, 2020 she will also receive physical and occupational therapy home health aide nursing and .  Go home.  She has not yet been  identified.    My clinical findings support the need for the above skilled services because: (Please write a brief narrative summary that describes what the RN, PT, SLP, or other services will be doing in the home. A list of diagnoses in this section does not meet the CMS requirements.)  She will require all the skilled services secondary to her hospitalizations along with generalized weakness dementia history of CVA history of gastric cancer heart failure along with exercises home safety transfers self-care deficits medication management and  for community support    This patient is homebound because: (Please write a brief narrative summary describing the functional limitations as to why this patient is homebound and specifically what makes this patient homebound.)  Secondary to multiple chronic medical conditions including her recent hospitalization but will also require assistance with basic ADLs home safety transfers medication management assessments and  for community support.    The patient is, or has been, under my care and I have initiated the establishment of the plan of care. This patient will be followed by a physician who will periodically review the plan of care.    Schedule follow up visit with primary care provider within 7 days to reestablish care.  She will follow-up with her primary care doctor regarding medication management and her chronic medical conditions.  See results above her most recent laboratory studies are still a B12 awaiting for today's level.  She is also on Coumadin and is being currently managed by the warfarin clinic.    Discharge coordination care greater than 30 minutes  Electronically signed by: Michael Duane Johnson, CNP

## 2021-06-20 NOTE — LETTER
Letter by Johnson, Michael Duane, CNP at      Author: Johnson, Michael Duane, CNP Service: -- Author Type: --    Filed:  Encounter Date: 2/3/2020 Status: (Other)         Patient: Lana Lantigua   MR Number: [id number]   YOB: 1926   Date of Visit: 2/3/2020     [facility]    Facility:   [facility] [[id number]]   Code Status: DNR      CHIEF COMPLAINT/REASON FOR VISIT:  Chief Complaint   Patient presents with   ? Follow Up     rehab       HISTORY:      HPI: Lana is a 89 y.o. female who is in the transitional care unit secondary to her couple of hospitalizations secondary to a left leg DVT along with another hospitalization in January 24 for acute on chronic diastolic heart failure.  She is back in the transitional care to try to improve her strength and overall conditioning.  She does have cognitive impairment.  She has been normotensive and afebrile and also on room air.  His blood sugars in the morning ranging 110-120 in the p.m. 1 69- 254 she is not on any Metformin she used to be but I think at this point we will discontinue the Metformin.  She is also on warfarin secondary to the CVA which is being managed by the Coumadin clinic.  Appetite is not that great has lost some weight.  Does need assistance with all ADLs.  No colds or flus or other problems.    Past Medical History:   Diagnosis Date   ? B12 deficiency    ? Dementia (H) 12/11/2018   ? DNI (do not intubate) 10/17/2017   ? DNR (do not resuscitate) 10/17/2017   ? Gastric carcinoma (H)  1994   ? Nonsmoker    ? Osteoporosis    ? Venous insufficiency 8/14/2017   ? White coat hypertension              Family History   Problem Relation Age of Onset   ? Heart failure Father    ? Lung cancer Sister    ? Hypertension Mother    ? Varicose Veins Mother    ? Cancer Son          choriocarcinoma     Social History     Socioeconomic History   ? Marital status:      Spouse name: Not on file   ? Number of  children: 7   ? Years of education: Not on file   ? Highest education level: Not on file   Occupational History   ? 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: RETIRED   Social Needs   ? Financial resource strain: Not on file   ? Food insecurity:     Worry: Not on file     Inability: Not on file   ? Transportation needs:     Medical: Not on file     Non-medical: Not on file   Tobacco Use   ? Smoking status: Never Smoker   ? Smokeless tobacco: Never Used   Substance and Sexual Activity   ? Alcohol use: No   ? Drug use: No   ? Sexual activity: Not on file   Lifestyle   ? Physical activity:     Days per week: Not on file     Minutes per session: Not on file   ? Stress: Not on file   Relationships   ? Social connections:     Talks on phone: Not on file     Gets together: Not on file     Attends Yarsani service: Not on file     Active member of club or organization: Not on file     Attends meetings of clubs or organizations: Not on file     Relationship status: Not on file   ? Intimate partner violence:     Fear of current or ex partner: Not on file     Emotionally abused: Not on file     Physically abused: Not on file     Forced sexual activity: Not on file   Other Topics Concern   ? Not on file   Social History Narrative    Has resided in [place] in the past.  Generally has lived in [place], [place].  [place] in [place] at this time.        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        1/24/20: Lana resides at [facility] in [place].         Review of Systems  Currently no reports of fever chills fatigue cough or cold flulike symptoms nausea vomiting diarrhea dysuria flulike symptoms headache or stiff neck.  History of A. fib left MCA CVA gastric cancer heart failure, dementia, recent left leg DVT.    Current Outpatient Medications   Medication Sig   ? acetaminophen (TYLENOL) 500 MG tablet Take 1 tablet (500 mg total) by mouth every 6 (six) hours as needed for pain  or fever.   ? albuterol (PROVENTIL) 2.5 mg /3 mL (0.083 %) nebulizer solution Take 2.5 mg by nebulization 4 (four) times a day as needed for wheezing.   ? aspirin 81 mg chewable tablet Chew 1 tablet (81 mg total) daily.   ? atorvastatin (LIPITOR) 40 MG tablet Take 1 tablet (40 mg total) by mouth at bedtime.   ? bisacodyl (DULCOLAX) 10 mg suppository Insert suppository rectally daily as needed for treatment of constipation.   ? cyanocobalamin 1,000 mcg/mL injection Inject 1,000 mcg into the shoulder, thigh, or buttocks every 30 (thirty) days.   ? furosemide (LASIX) 20 MG tablet Take 1 tablet (20 mg total) by mouth daily.   ? melatonin 3 mg Tab tablet Take 3 mg by mouth at bedtime.   ? metFORMIN (FORTAMET) 500 MG (OSM) 24 hr tablet Take 1 tablet (500 mg total) by mouth 2 (two) times a day with meals.   ? metoprolol tartrate (LOPRESSOR) 50 MG tablet Take 50 mg by mouth 2 (two) times a day.   ? polyethylene glycol (MIRALAX) 17 gram packet Take 1 packet (17 g total) by mouth daily as needed.   ? senna-docusate (PERICOLACE) 8.6-50 mg tablet Take 1 tablet by mouth 2 (two) times a day as needed for constipation. (Patient taking differently: Take 1 tablet by mouth 2 (two) times a day. )   ? traZODone (DESYREL) 50 MG tablet Take 25 mg by mouth at bedtime.   ? triamcinolone (KENALOG) 0.1 % cream Apply topically 2 (two) times a day. (Patient taking differently: Apply topically 2 (two) times a day. Apply to left lower extremity for dermatitis.)   ? warfarin ANTICOAGULANT (COUMADIN/JANTOVEN) 1 MG tablet Take 0.5 tablets (0.5 mg total) by mouth daily. Adjust dose based on INR results.       There were no vitals filed for this visit.  Blood pressure 103/64 pulse 99 respirations 18 temperature 97.5 saturation room air 99% weight 93 pounds her weight on January 19 100 pounds  Physical Exam  Head is normocephalic.  Neck is supple without adenopathy lungs are clear throughout.  Cardiovascular does have A. fib along with 3/6 MAGDIEL.  Lower  extremity edema.  Left greater than right.  Left leg with a DVT.  Musculoskeletal generalized weakness.   Denies pain.  Psychiatric Pleasant but does have cognitive impairment.     LABS:   Lab Results   Component Value Date    WBC 8.8 01/31/2020    HGB 9.0 (L) 01/31/2020    HCT 30.5 (L) 01/31/2020    MCV 86 01/31/2020     01/31/2020     Lab Results   Component Value Date    ALBUMIN 2.5 (L) 01/31/2020         ASSESSMENT:      ICD-10-CM    1. Acute cerebrovascular accident (H) I63.9    2. Aphasia R47.01    3. Oropharyngeal dysphagia R13.12    4. Severe protein-calorie malnutrition (H) E43        PLAN:    No other new changes.  Vital signs remained stable does have protein malnutrition is losing weight.  Discontinue the blood sugars and keep her off the Metformin.  She does look comfortable.  She is on a level 2 diet due to dysphagia.      Electronically signed by: Michael Duane Johnson, KRYSTAL

## 2021-06-20 NOTE — LETTER
Letter by Edgardo Lund DO at      Author: Edgardo Lund DO Service: -- Author Type: --    Filed:  Encounter Date: 1/30/2020 Status: (Other)         Patient: Lana Lantigua   MR Number: [id number]   YOB: 1926   Date of Visit: 1/30/2020     [facility]      Facility:    [facility] [[id number]]  Code Status: DNR      Chief Complaint/Reason for Visit:  Chief Complaint   Patient presents with   ? H & P     History of CVA with a aphasia, recent deep vein thrombosis currently on Coumadin, atrial fibrillation with rapid ventricular response, left middle cerebral artery CVA, aspiration with possible dysphasia, bilateral pleural effusions, congestive heart failure with preserved ejection fraction.       HPI:   Lana is a 89 y.o. female who was residing here at the [facility] undergoing physical and occupational therapy secondary to multiple problems the first was a hospitalization back in December for middle cerebral artery CVA with status post TPA thrombectomy.  Did a readmission to the hospital with DVT while on apixaban and now had was switched to Coumadin.  Was then sent in the hospital from the TCU for dyspnea and hypoxia.  She was diagnosed with chronic diastolic congestive heart failure and acute hypoxic respiratory failure.  Her brain natruretic peptide was elevated and CT scan did show bilateral pleural effusions.  They continue her oral Lasix at this time and she did an ejection fraction 50%.  Cardiology recommended medical management patient did receive IV Lasix and continue her Lasix 20 mg daily.  Metoprolol was also continued.  And she did taper off the oxygen.  She did have atrial fibrillation rapid ventricular response but heart rate was controlled with metoprolol 50 mg 2 times daily.  She is anticoagulated at this time with Coumadin.    Back in December she did have a left middle cerebral artery CVA in which she does have  a aphasia and dysphasia.  The question was whether or not she had aspiration pneumonia she elevated lactic acid however the procalcitonin was normal in the hospital she was on Zosyn now is finishing up on Augmentin.    Patient is currently on metformin type 2 diabetes and family is unsure where this ever came from.  Her blood sugars have been okay at this time she has been taken the medications.  I am going to hold this at this time.  She was treated appropriately and transferred here to the TCU at [facility] in stable condition.    Past Medical History:  Past Medical History:   Diagnosis Date   ? B12 deficiency    ? Dementia (H) 12/11/2018   ? DNI (do not intubate) 10/17/2017   ? DNR (do not resuscitate) 10/17/2017   ? Gastric carcinoma (H)  1994   ? Nonsmoker    ? Osteoporosis    ? Venous insufficiency 8/14/2017   ? White coat hypertension            Surgical History:  Past Surgical History:   Procedure Laterality Date   ? APPENDECTOMY  1960   ? CATARACT EXTRACTION Bilateral    ? CHOLECYSTECTOMY  1960   ? HYSTERECTOMY  1960   ? IR CEREBRAL ANGIOGRAM  12/22/2019   ? TONSILLECTOMY  age 20    ? TOTAL GASTRECTOMY  12/01/1994       Family History:   Family History   Problem Relation Age of Onset   ? Heart failure Father    ? Lung cancer Sister    ? Hypertension Mother    ? Varicose Veins Mother    ? Cancer Son          choriocarcinoma       Social History:    Social History     Socioeconomic History   ? Marital status:      Spouse name: None   ? Number of children: 7   ? Years of education: None   ? Highest education level: None   Occupational History   ? 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: RETIRED   Social Needs   ? Financial resource strain: None   ? Food insecurity:     Worry: None     Inability: None   ? Transportation needs:     Medical: None     Non-medical: None   Tobacco Use   ? Smoking status: Never Smoker   ? Smokeless tobacco: Never Used   Substance and Sexual Activity   ?  Alcohol use: No   ? Drug use: No   ? Sexual activity: None   Lifestyle   ? Physical activity:     Days per week: None     Minutes per session: None   ? Stress: None   Relationships   ? Social connections:     Talks on phone: None     Gets together: None     Attends Restorationist service: None     Active member of club or organization: None     Attends meetings of clubs or organizations: None     Relationship status: None   ? Intimate partner violence:     Fear of current or ex partner: None     Emotionally abused: None     Physically abused: None     Forced sexual activity: None   Other Topics Concern   ? None   Social History Narrative    Has resided in [place] in the past.  Generally has lived in [place], [place].  [place] in [place] at this time.        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        1/24/20: Lana resides at [facility] in [place].          Review of Systems   Constitutional:        Patient is sleeping is very poor historian review of system is done from the family.  She is comfortable however and does answer questions appropriately but I am unable to do review of systems secondary to a aphasia.       Vitals:    01/30/20 1433   BP: 106/68   Pulse: 76   Resp: 18   Temp: 97.8  F (36.6  C)   SpO2: 95%       Physical Exam  Constitutional:       Comments: Patient is lying in bed comfortably and does not appear to be in acute distress.   HENT:      Head: Normocephalic and atraumatic.      Nose: Nose normal. No congestion or rhinorrhea.   Eyes:      General:         Right eye: No discharge.         Left eye: No discharge.   Cardiovascular:      Comments: Irregularly irregular with adequate rate control.  Pulmonary:      Effort: Pulmonary effort is normal. No respiratory distress.      Breath sounds: Wheezing present.      Comments: Mild crackles were also noted at the bases.  Abdominal:      General: Abdomen is flat. Bowel sounds are normal. There is no  distension.      Tenderness: There is no abdominal tenderness.   Musculoskeletal:      Right lower leg: Edema present.      Left lower leg: Edema present.   Skin:     General: Skin is warm and dry.   Neurological:      Comments: Patient is a phasic at this time but does follow some commands.  She is able to handgrip bilateral and she is slightly weaker on the right side but can move all 4 extremities.  Cranial nerves were pretty much intact.   Psychiatric:         Mood and Affect: Mood normal.         Behavior: Behavior normal.         Medication List:  Current Outpatient Medications   Medication Sig   ? acetaminophen (TYLENOL) 500 MG tablet Take 1 tablet (500 mg total) by mouth every 6 (six) hours as needed for pain or fever.   ? albuterol (PROVENTIL) 2.5 mg /3 mL (0.083 %) nebulizer solution Take 2.5 mg by nebulization 4 (four) times a day as needed for wheezing.   ? aspirin 81 mg chewable tablet Chew 1 tablet (81 mg total) daily.   ? atorvastatin (LIPITOR) 40 MG tablet Take 1 tablet (40 mg total) by mouth at bedtime.   ? bisacodyl (DULCOLAX) 10 mg suppository Insert suppository rectally daily as needed for treatment of constipation.   ? cyanocobalamin 1,000 mcg/mL injection Inject 1,000 mcg into the shoulder, thigh, or buttocks every 30 (thirty) days.   ? furosemide (LASIX) 20 MG tablet Take 1 tablet (20 mg total) by mouth daily.   ? melatonin 3 mg Tab tablet Take 3 mg by mouth at bedtime.   ? metoprolol tartrate (LOPRESSOR) 50 MG tablet Take 50 mg by mouth 2 (two) times a day.   ? polyethylene glycol (MIRALAX) 17 gram packet Take 1 packet (17 g total) by mouth daily as needed.   ? senna-docusate (PERICOLACE) 8.6-50 mg tablet Take 1 tablet by mouth 2 (two) times a day as needed for constipation. (Patient taking differently: Take 1 tablet by mouth 2 (two) times a day. )   ? traZODone (DESYREL) 50 MG tablet Take 25 mg by mouth at bedtime.   ? triamcinolone (KENALOG) 0.1 % cream Apply topically 2 (two) times a day.  (Patient taking differently: Apply topically 2 (two) times a day. Apply to left lower extremity for dermatitis.)   ? warfarin ANTICOAGULANT (COUMADIN/JANTOVEN) 1 MG tablet Take 0.5 tablets (0.5 mg total) by mouth daily. Adjust dose based on INR results.   ? metFORMIN (FORTAMET) 500 MG (OSM) 24 hr tablet Take 1 tablet (500 mg total) by mouth 2 (two) times a day with meals.       Labs:      Assessment:    ICD-10-CM    1. Acute deep vein thrombosis of lower leg, left (H) I82.4Z2    2. Acute cerebrovascular accident (H) I63.9    3. Atrial fibrillation with rapid ventricular response (H) I48.91    4. Chronic systolic congestive heart failure (H) I50.22    5. Primary hypertension I10    6. Aphasia R47.01    7. Dysphasia R47.02        Plan: Plan at this time will hold metformin and monitor blood sugars to see if he really needs this but will continue to check blood sugars 4 times daily.  Speech therapy will evaluate and treat and liver function test basic metabolic profile and CBC will be done tomorrow.  Also brain natruretic peptide will be done and we will continue to aggressively manage her fluid status.  Speech therapy evaluate and treat and we need a diet for this patient.  I will continue to monitor above medical problems and no other changes to care plan at this time.        Electronically signed by: Edgardo Lund DO

## 2021-06-20 NOTE — LETTER
Letter by Johnson, Michael Duane, CNP at      Author: Johnson, Michael Duane, CNP Service: -- Author Type: --    Filed:  Encounter Date: 2/5/2020 Status: (Other)         Patient: Lana Lantigua   MR Number: [id number]   YOB: 1926   Date of Visit: 2/5/2020     [facility]    Facility:   [facility] [[id number]]   Code Status: DNR      CHIEF COMPLAINT/REASON FOR VISIT:  Chief Complaint   Patient presents with   ? Follow Up     rehab, dvt, weak, dem       HISTORY:      HPI: Lana is a 89 y.o. female who is in the transitional care unit plus had the opportunity to revisit with her secondary to her hospitalization for left leg DVT in January sixth and then a hospitalization January 24 for acute on chronic diastolic heart failure.  She currently does have cognitive impairment and does have a history of A. fib which is being managed by the Coumadin clinic.  She does have dysphasia she has not had any swallowing episodes has been working with speech therapy.  She does take trazodone and melatonin for sleep.  She has been normotensive and afebrile.  Getting extra nutrient supplements.  Lungs have remained clear.  No lower extremity edema.  She also has Tamiflu prophylactically from February 3 through February 16    Past Medical History:   Diagnosis Date   ? B12 deficiency    ? Dementia (H) 12/11/2018   ? DNI (do not intubate) 10/17/2017   ? DNR (do not resuscitate) 10/17/2017   ? Gastric carcinoma (H)  1994   ? Nonsmoker    ? Osteoporosis    ? Venous insufficiency 8/14/2017   ? White coat hypertension              Family History   Problem Relation Age of Onset   ? Heart failure Father    ? Lung cancer Sister    ? Hypertension Mother    ? Varicose Veins Mother    ? Cancer Son          choriocarcinoma     Social History     Socioeconomic History   ? Marital status:      Spouse name: Not on file   ? Number of children: 7   ? Years of education: Not on file   ?  Highest education level: Not on file   Occupational History   ? 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: RETIRED   Social Needs   ? Financial resource strain: Not on file   ? Food insecurity:     Worry: Not on file     Inability: Not on file   ? Transportation needs:     Medical: Not on file     Non-medical: Not on file   Tobacco Use   ? Smoking status: Never Smoker   ? Smokeless tobacco: Never Used   Substance and Sexual Activity   ? Alcohol use: No   ? Drug use: No   ? Sexual activity: Not on file   Lifestyle   ? Physical activity:     Days per week: Not on file     Minutes per session: Not on file   ? Stress: Not on file   Relationships   ? Social connections:     Talks on phone: Not on file     Gets together: Not on file     Attends Baptist service: Not on file     Active member of club or organization: Not on file     Attends meetings of clubs or organizations: Not on file     Relationship status: Not on file   ? Intimate partner violence:     Fear of current or ex partner: Not on file     Emotionally abused: Not on file     Physically abused: Not on file     Forced sexual activity: Not on file   Other Topics Concern   ? Not on file   Social History Narrative    Has resided in [place] in the past.  Generally has lived in [place], [place].  [place] in [place] at this time.        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        1/24/20: Lana resides at [facility] in [place].         Review of Systems  Currently no reports of fever chills fatigue cough or cold flulike symptoms nausea vomiting diarrhea dysuria flulike symptoms headache or stiff neck.  History of A. fib left MCA CVA gastric cancer heart failure, dementia, recent left leg DVT.         Current Outpatient Medications:   ?  acetaminophen (TYLENOL) 500 MG tablet, Take 1 tablet (500 mg total) by mouth every 6 (six) hours as needed for pain or fever., Disp: , Rfl: 0  ?  albuterol (PROVENTIL) 2.5 mg /3  mL (0.083 %) nebulizer solution, Take 2.5 mg by nebulization 4 (four) times a day as needed for wheezing., Disp: , Rfl:   ?  aspirin 81 mg chewable tablet, Chew 1 tablet (81 mg total) daily., Disp: , Rfl: 0  ?  atorvastatin (LIPITOR) 40 MG tablet, Take 1 tablet (40 mg total) by mouth at bedtime., Disp: , Rfl: 0  ?  bisacodyl (DULCOLAX) 10 mg suppository, Insert suppository rectally daily as needed for treatment of constipation., Disp: , Rfl: 0  ?  cyanocobalamin 1,000 mcg/mL injection, Inject 1,000 mcg into the shoulder, thigh, or buttocks every 30 (thirty) days., Disp: , Rfl:   ?  furosemide (LASIX) 20 MG tablet, Take 1 tablet (20 mg total) by mouth daily., Disp: , Rfl: 0  ?  melatonin 3 mg Tab tablet, Take 3 mg by mouth at bedtime., Disp: , Rfl:   ?  metFORMIN (FORTAMET) 500 MG (OSM) 24 hr tablet, Take 1 tablet (500 mg total) by mouth 2 (two) times a day with meals., Disp: , Rfl:   ?  metoprolol tartrate (LOPRESSOR) 50 MG tablet, Take 50 mg by mouth 2 (two) times a day., Disp: , Rfl:   ?  polyethylene glycol (MIRALAX) 17 gram packet, Take 1 packet (17 g total) by mouth daily as needed., Disp: , Rfl: 0  ?  senna-docusate (PERICOLACE) 8.6-50 mg tablet, Take 1 tablet by mouth 2 (two) times a day as needed for constipation. (Patient taking differently: Take 1 tablet by mouth 2 (two) times a day. ), Disp: , Rfl: 0  ?  traZODone (DESYREL) 50 MG tablet, Take 25 mg by mouth at bedtime., Disp: , Rfl:   ?  triamcinolone (KENALOG) 0.1 % cream, Apply topically 2 (two) times a day. (Patient taking differently: Apply topically 2 (two) times a day. Apply to left lower extremity for dermatitis.), Disp: 454 g, Rfl: 2  ?  warfarin ANTICOAGULANT (COUMADIN/JANTOVEN) 1 MG tablet, Take 0.5 tablets (0.5 mg total) by mouth daily. Adjust dose based on INR results., Disp: , Rfl: 0    There were no vitals filed for this visit.     Blood pressure 97/61 pulse 88 temperature 98.2 saturation room air 96%   Physical Exam   Head is normocephalic.   Neck is supple without adenopathy lungs are clear throughout.  Cardiovascular does have A. fib along with 3/6 MAGDIEL.  Lower extremity edema.  Left greater than right.  Left leg with a DVT.  Musculoskeletal generalized weakness.   Denies pain.  Psychiatric Pleasant but does have cognitive impairment.     LABS:   Lab Results   Component Value Date    WBC 8.8 01/31/2020    HGB 9.0 (L) 01/31/2020    HCT 30.5 (L) 01/31/2020    MCV 86 01/31/2020     01/31/2020     Results for orders placed or performed in visit on 01/31/20   Basic Metabolic Panel   Result Value Ref Range    Sodium 141 136 - 145 mmol/L    Potassium 3.6 3.5 - 5.0 mmol/L    Chloride 103 98 - 107 mmol/L    CO2 30 22 - 31 mmol/L    Anion Gap, Calculation 8 5 - 18 mmol/L    Glucose 77 70 - 125 mg/dL    Calcium 8.1 (L) 8.5 - 10.5 mg/dL    BUN 18 8 - 28 mg/dL    Creatinine 0.66 0.60 - 1.10 mg/dL    GFR MDRD Af Amer >60 >60 mL/min/1.73m2    GFR MDRD Non Af Amer >60 >60 mL/min/1.73m2           ASSESSMENT:      ICD-10-CM    1. Severe protein-calorie malnutrition (H) E43    2. Oropharyngeal dysphagia R13.12    3. Ischemic cerebrovascular accident (CVA) (H) - 2018 I63.9    4. Gastric carcinoma (H) C16.9        PLAN:    No further changes at this time.  Looks like there is a potential for discharge by this weekend.  Nutritional wise she is getting extra nutrient supplements.  From a heart standpoint she has had lungs that have been clear and no lower extremity edema.  Continue to manage and follow.      Electronically signed by: Michael Duane Johnson, CNP

## 2021-06-20 NOTE — LETTER
Letter by Eladio Morejon MD at      Author: Eladio Morejon MD Service: -- Author Type: --    Filed:  Encounter Date: 12/31/2019 Status: Signed         Patient: Lana Lantigua   MR Number: [id number]   YOB: 1926   Date of Visit: 12/31/2019     [facility]    Facility:   [facility] [[id number]]   Code Status: DNR/DNI      CHIEF COMPLAINT/REASON FOR VISIT:  Chief Complaint   Patient presents with   ? H & P       HISTORY:      HPI: Ms. Person is a 89-year-old female with a past medical history of 2018 ischemic CVA, hypertension, osteoporosis, remote gastric cancer (1994 gastrectomy), moderate atrial regurgitation, admitted to [facility] following her recent hospital stay for another/acute CVA.    Hospital course: Patient presented December 22 and was discharged December 27.  She presented with acute right-sided weakness (had some residual on the right side from 2018) and aphasia.  She was found to have left MCA M1 1 obstruction and underwent endovascular mechanical thrombectomy on the 22nd.  Follow-up MRI on the 23rd showed 3 x 4 cm area of involvement the left frontal area plus a 3 mm right P gyrus lesion.   Patient was newly diagnosed with atrial fibrillation on presentation, and this is felt to be the mechanism of her CVA.  Echocardiogram additionally showed a moderate aortic regurgitation.  She was seen by neurology who recommended full anticoagulation and she is now on apixaban plus low-dose aspirin.  Patient had excellent recovery of her motor function/right hemiplegia.  Unfortunately her expressive aphasia remains profoundly affected with some improvement of her receptive aphasia.    Subjective/review of systems: Patient is unable to communicate but does answer questions appropriately and follows commands.  Most of the information is obtained from medical record as well as TCU staff and especially her multiple family members who are  "at bedside including her spouse son and daughter-in-law.  They all agree that her motor recovery has been good.  They have not been sure how much she understands but she has been able to say very little sometimes she will get out a single word such as \"okay\".  They can see the frustration in her face when she is trying to respond to questions.   They also notes that she has had progressive dementia.  She is unable to cook over the last year.  Has needed her  supervision for most all activities, medications, etc.  However she is remained active and mobile with minimal right-sided residual from her 2018 CVA.  Staff notes that she has been impulsive here and they have been worried about her transferring on her own for example to go to the bathroom.  She has been spending a lot of time with the [facility] [place] [place] working at the  on puzzles etc.  To be well supervised.  She is been mildly constipated, her medications have been PRN for her bowel program and we discussed changes there today.  She is got chronic peripheral edema which we discussed today.  She has had some recent redness in the right lower leg but was not on antibiotics this last hospitalization.  She was on antibiotics for left leg cellulitis/nonhealing wound last summer but it eventually healed.  There is been no fever sweats or chills.  No cough or swallowing difficulty.  She did have a VFSS on 1226 with speech pathology and did well.  No falls injuries.  Remainder is negative    Past Medical History:   Diagnosis Date   ? B12 deficiency    ? Dementia (H) 12/11/2018   ? DNI (do not intubate) 10/17/2017   ? DNR (do not resuscitate) 10/17/2017   ? Gastric carcinoma (H)  1994   ? Nonsmoker    ? Osteoporosis    ? Venous insufficiency 8/14/2017   ? White coat hypertension              Family History   Problem Relation Age of Onset   ? Heart failure Father    ? Lung cancer Sister    ? Hypertension Mother    ? Varicose Veins Mother    ? Cancer Son          " choriocarcinoma     Social History     Socioeconomic History   ? Marital status:      Spouse name: None   ? Number of children: 7   ? Years of education: None   ? Highest education level: None   Occupational History   ? 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: RETIRED   Social Needs   ? Financial resource strain: None   ? Food insecurity:     Worry: None     Inability: None   ? Transportation needs:     Medical: None     Non-medical: None   Tobacco Use   ? Smoking status: Never Smoker   ? Smokeless tobacco: Never Used   Substance and Sexual Activity   ? Alcohol use: No   ? Drug use: No   ? Sexual activity: None   Lifestyle   ? Physical activity:     Days per week: None     Minutes per session: None   ? Stress: None   Relationships   ? Social connections:     Talks on phone: None     Gets together: None     Attends Restoration service: None     Active member of club or organization: None     Attends meetings of clubs or organizations: None     Relationship status: None   ? Intimate partner violence:     Fear of current or ex partner: None     Emotionally abused: None     Physically abused: None     Forced sexual activity: None   Other Topics Concern   ? None   Social History Narrative    Has resided in [place] in the past.  Generally has lived in [place], [place].  [place] in [place] at this time.        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         Review of Systems remainder is negative    Vitals:    12/31/19 2117   BP: 141/85   Pulse: 93   Resp: 18   Temp: 98.1  F (36.7  C)   SpO2: 98%   Weight: 106 lb (48.1 kg)       Physical Exam  Thin elderly female in no distress socially appropriate with eye contact and efforts to speak.  She follows commands consistently.  Normocephalic atraumatic sclera clear nonicteric EOMI oropharynx is moist neck is without adenopathy mass or thyromegaly heart is irregularly irregular at 90 S1-S2 with early diastolic murmur which is subtle PMI is  present in the left precordium and not laterally displaced abdomen is thin without organomegaly mass or tenderness normal bowel sounds strength in her arms and legs is grossly symmetric from side to side finger-nose-finger rapidly alternating movements are symmetric side to side but slow.  There is mild psychomotor retardation noted with response to questions/commands.  She has significant bilateral edema pitting to the knees.  Right side has dusky redness fading turns purple throughout much of the leg from the upper tibia level down the lateral leg towards the malleolus.  There is an old fading marker line around it.  There is subcutaneous blistering anteriorly with a clear serous fluid which is not draining.  No blood or pus noted.  There is scarring on the left leg from previous.  LABS:   Results for ANN TRIPP (MRN [id number]) as of 12/31/2019 21:38   Ref. Range 12/27/2019 04:29 12/27/2019 07:52 12/27/2019 11:34   Potassium Latest Ref Range: 3.5 - 5.0 mmol/L 4.1     Magnesium Latest Ref Range: 1.8 - 2.6 mg/dL 1.8     Glucose Latest Ref Range: 70 - 139 mg/dL  101 168 (H)     Creatinine 0.98, GFR 53  ASSESSMENT:      ICD-10-CM    1. Age related osteoporosis, unspecified pathological fracture presence M81.0    2. Severe protein-calorie malnutrition ([place]: less than 60% of standard weight) (H) E43    3. DNI (do not intubate) Z78.9    4. DNR (do not resuscitate) Z66    5. Aphasia R47.01    6. Right hemiplegia (H) G81.91    7. Acute cerebrovascular accident (H) I63.9    8. Primary hypertension I10      Assessment/plan    1.  CVA left MCA/M1, embolic related to A. Fib       Right hemiparesis minimal good recovery       Expressive and receptive aphasia       CVA 2018     Good motor recovery, decent receptive ability at this point but expressive aphasia is dense.  Complicated by her underlying dementia.  PT, OT, social service, speech therapy.  Prognosis for recovery discussed with patient and family.  She has  follow-up with neurology in 2 months, peak recovery in 2 to 3 months typical.  Current plan is for her to return home with her .   -Apixaban   -Aspirin   -Statin--- with LDL goal less than 70   -Blood pressure control    2.  Dementia     -Underlying complicating above.  Patient is not currently on therapy.  Unclear if she would benefit but would not recommend considering treatment and current clinical situation    3.  New onset atrial fibrillation     Recognize this hospitalization.  Now on apixaban.  Also on metoprolol 50 twice daily.  Rate control marginal with rate 90s.  We will need to watch this and adjust as appropriate.  Reviewed risks of these medications patient and family state understanding.    4.  Hypertension     Now on metoprolol 50 twice daily will monitor blood pressures which have been adequate here    5.  Underweight BMI 18.70     Dietitian consultation, dietary supplements    6.  New prediabetes diagnosis     A1c 6.4 patient was started on metformin which she is tolerating.  We will continue 500 mg daily.  Recheck A1c in 3 months    7.  AR     Moderate, noncontributory at the moment.  Periodic follow-up may be appropriate depending on status.    8.  Remote gastric cancer.  Family reports that she is been eating normally 3 years.  They also report that she is always been thin.  Dietitian consult placed.    9.  Constipation is been a chronic problem for her.  I did change the senna to twice daily scheduled she has PEG bisacodyl as needed                      Electronically signed by: Eladio Morejon MD

## 2021-06-20 NOTE — LETTER
Letter by Aura Buck CNP at      Author: Aura Buck CNP Service: -- Author Type: --    Filed:  Encounter Date: 1/2/2020 Status: Signed         Patient: Lana Lantigua   MR Number: [id number]   YOB: 1926   Date of Visit: 1/2/2020     Code Status:  DNR  Visit Type: Problem Visit (Insomnia)     Facility:  [facility] [[id number]]      Facility Type: SNF (Skilled Nursing Facility, TCU)    History of Present Illness:   Hospital Admission Date: 12/22/2019 Hospital Discharge Date: 12/27/2019      Lana Lantigua is a 89 y.o. female who has a past medical history for gastric cancer, hypertension, osteoporosis, vitamin B12 deficiency, Alzheimer's type dementia, and malnutrition.  She was recently hospitalized for right-sided weakness and aphasia and was found to have an acute left MCA ischemic stroke.  In the emergency room she did receive TPA and thrombectomy.  She was started on aspirin and atorvastatin and is to follow-up with neurology.  She does have right-sided hemiparesis with aphasia and was found to have dysphasia however her diet has been advanced and is currently on puréed diet.  She also developed atrial fibrillation with RVR and was started on metoprolol and apixaban.  She was found to have moderate aortic regurgitation and is to follow-up with cardiology.  Her A1c was 6.4% and she was started on metformin.    Today, nursing voices concern regarding her sleeping habits the past 2 nights.  Nursing reports that she does not sleep at all during the night and then is very fatigued and tired during the day napping frequently.  Nursing also reports that she seems to be more confused likely related to sleep deprivation.  She is minimally verbal and is not always easily redirected due to her dementia.  I do see her lying in bed and she is sleeping.  She is easily awoke with voice however only answers my questions with 1 or 2 word answers.    Past Medical History:   Diagnosis Date    ? B12 deficiency    ? Dementia (H) 12/11/2018   ? DNI (do not intubate) 10/17/2017   ? DNR (do not resuscitate) 10/17/2017   ? Gastric carcinoma (H)  1994   ? Nonsmoker    ? Osteoporosis    ? Venous insufficiency 8/14/2017   ? White coat hypertension        Current Outpatient Medications   Medication Sig Dispense Refill   ? melatonin 3 mg Tab tablet Take 3 mg by mouth at bedtime.     ? traZODone (DESYREL) 50 MG tablet Take 25 mg by mouth at bedtime.     ? acetaminophen (TYLENOL) 500 MG tablet Take 1 tablet (500 mg total) by mouth every 6 (six) hours as needed for pain or fever.  0   ? apixaban (ELIQUIS) 2.5 mg Tab tablet Take 1 tablet (2.5 mg total) by mouth 2 (two) times a day. 60 tablet 0   ? aspirin 81 mg chewable tablet Chew 1 tablet (81 mg total) daily.  0   ? atorvastatin (LIPITOR) 40 MG tablet Take 1 tablet (40 mg total) by mouth at bedtime.  0   ? bisacodyl (DULCOLAX) 10 mg suppository Insert suppository rectally daily as needed for treatment of constipation.  0   ? metFORMIN (FORTAMET) 500 MG (OSM) 24 hr tablet Take 1 tablet (500 mg total) by mouth daily with breakfast.     ? metoprolol tartrate (LOPRESSOR) 50 MG tablet Take 1 tablet (50 mg total) by mouth 2 (two) times a day.  0   ? polyethylene glycol (MIRALAX) 17 gram packet Take 1 packet (17 g total) by mouth daily as needed.  0   ? senna-docusate (PERICOLACE) 8.6-50 mg tablet Take 1 tablet by mouth 2 (two) times a day as needed for constipation.  0   ? triamcinolone (KENALOG) 0.1 % cream Apply topically 2 (two) times a day. 454 g 2     Current Facility-Administered Medications   Medication Dose Route Frequency Provider Last Rate Last Dose   ? cyanocobalamin injection 1,000 mcg  1,000 mcg Intramuscular Q30 Days George Park MD   1,000 mcg at 12/05/19 1033     No Known Allergies  Immunization History   Administered Date(s) Administered   ? Influenza high dose,seasonal,PF, 65+ yrs 10/17/2017, 10/04/2019   ? Influenza, inj, historic,unspecified  09/10/2009, 12/08/2013, 10/08/2018   ? Pneumo Polysac 23-V 12/01/2004       Review of Systems   To call to obtain due to patient's dementia.  She does deny any current pain or discomforts.  Nursing reports she is having bowel movements and urinating well.  Speech therapy will be increasing her diet to mechanical soft today as she is swallowing well.    Physical Exam   Vital signs: /78, heart rate 94, respiratory 16, temp 97.9, 95% on room air.  Blood sugars in good range 98-1 80.  GENERAL APPEARANCE: Thin, elderly female in no acute distress.  HEENT: normocephalic, atraumatic  PERRL, sclerae anicteric, conjunctivae clear and moist, EOM intact  LUNGS: Lung sounds CTA, no adventitious sounds, respiratory effort normal.  CARD: RRR, S1, S2, without murmurs, gallops, rubs,   ABD: Soft and nontender with normal bowel sounds.   MSK: Right-sided weakness lower extremity  EXTREMITIES: No cyanosis, clubbing or edema.  NEURO: Alert with obvious cognitive impairment.  Face is symmetric.  SKIN: Inspection of the skin reveals no rashes, ulcerations or petechiae.  PSYCH: euthymic            Labs:    Recent Results (from the past 240 hour(s))   Potassium - Next AM   Result Value Ref Range    Potassium 3.8 3.5 - 5.0 mmol/L   Magnesium   Result Value Ref Range    Magnesium 1.8 1.8 - 2.6 mg/dL   POCT Glucose - when taking PO 4x daily AC and QHS   Result Value Ref Range    Glucose 143 (H) 70 - 139 mg/dL   POCT Glucose - when taking PO 4x daily AC and QHS   Result Value Ref Range    Glucose 124 70 - 139 mg/dL   POCT Glucose - when taking PO 4x daily AC and QHS   Result Value Ref Range    Glucose 157 (H) 70 - 139 mg/dL   POCT Glucose - when taking PO 4x daily AC and QHS   Result Value Ref Range    Glucose 176 (H) 70 - 139 mg/dL   Magnesium   Result Value Ref Range    Magnesium 1.8 1.8 - 2.6 mg/dL   HM2(CBC w/o Differential)   Result Value Ref Range    WBC 10.2 4.0 - 11.0 thou/uL    RBC 2.99 (L) 3.80 - 5.40 mill/uL    Hemoglobin 8.5  (L) 12.0 - 16.0 g/dL    Hematocrit 27.3 (L) 35.0 - 47.0 %    MCV 91 80 - 100 fL    MCH 28.4 27.0 - 34.0 pg    MCHC 31.1 (L) 32.0 - 36.0 g/dL    RDW 14.1 11.0 - 14.5 %    Platelets 169 140 - 440 thou/uL    MPV 10.3 8.5 - 12.5 fL   Basic Metabolic Panel   Result Value Ref Range    Sodium 140 136 - 145 mmol/L    Potassium 3.8 3.5 - 5.0 mmol/L    Chloride 110 (H) 98 - 107 mmol/L    CO2 23 22 - 31 mmol/L    Anion Gap, Calculation 7 5 - 18 mmol/L    Glucose 113 70 - 125 mg/dL    Calcium 7.9 (L) 8.5 - 10.5 mg/dL    BUN 24 8 - 28 mg/dL    Creatinine 0.98 0.60 - 1.10 mg/dL    GFR MDRD Af Amer >60 >60 mL/min/1.73m2    GFR MDRD Non Af Amer 53 (L) >60 mL/min/1.73m2   POCT Glucose - when taking PO 4x daily AC and QHS   Result Value Ref Range    Glucose 110 70 - 139 mg/dL   POCT Glucose   Result Value Ref Range    Glucose 205 (H) 70 - 139 mg/dL   POCT Glucose   Result Value Ref Range    Glucose 173 (H) 70 - 139 mg/dL   Magnesium   Result Value Ref Range    Magnesium 1.9 1.8 - 2.6 mg/dL   Potassium - Next AM   Result Value Ref Range    Potassium 3.6 3.5 - 5.0 mmol/L   POCT Glucose   Result Value Ref Range    Glucose 113 70 - 139 mg/dL   POCT Glucose   Result Value Ref Range    Glucose 196 (H) 70 - 139 mg/dL   POCT Glucose   Result Value Ref Range    Glucose 115 70 - 139 mg/dL   POCT Glucose   Result Value Ref Range    Glucose 212 (H) 70 - 139 mg/dL   Magnesium   Result Value Ref Range    Magnesium 1.8 1.8 - 2.6 mg/dL   Potassium - Next AM   Result Value Ref Range    Potassium 4.1 3.5 - 5.0 mmol/L   POCT Glucose   Result Value Ref Range    Glucose 101 70 - 139 mg/dL   POCT Glucose   Result Value Ref Range    Glucose 168 (H) 70 - 139 mg/dL         Assessment:  1. Sleep disturbance     2. Acute cerebrovascular accident (H)     3. Right hemiplegia (H)     4. Type 2 diabetes mellitus without complication, without long-term current use of insulin (H)         Plan:    Sleep disturbance: Her intermittent cognition is likely related to  sleep deprivation.  We will start her on melatonin in order to cycle her circadian rhythms.  Nursing to give melatonin at 7 PM in order for her to become drowsy at bedtime.  We will also start trazodone 25 mg at at bedtime Will need to closely monitor due to her age.  Could consider an antidepressant in the future if continues to have issues with distractibility and unable to redirect.    Acute CVA: Continue therapies unsure of the recovery due to her underlying dementia.  Continue on atorvastatin and aspirin.    Hemiplegia: Continue therapies, very high falls risk will need close supervision.  If she continues to have issues with redirection may need higher level of care at discharge.    Diabetes: Blood sugars look good we will change her blood sugar checks to once daily in the a.m. and continue metformin 500 mg daily.        Electronically signed by: Aura Buck CNP

## 2021-06-20 NOTE — LETTER
Letter by George Park MD at      Author: George Park MD Service: -- Author Type: --    Filed:  Encounter Date: 5/5/2020 Status: (Other)         Lana Lantigua  [address]  [place] [address]      05/07/20      Dear Lana,      We have made attempts to call you for a message from Dr. Park, please verify your contact information is correct when calling back.      Pt is scheduled to see Dr. Park 5/29/20, Due to COVID Dr. Park's schedule has changed he is doing video visits the week of 5/29/20.     If pt calls back please assist with scheduling pt for an video visit (appointment will have to be changed to fit new schedule template) or pt can be rescheduled for an in office visit the week of 5/18/20 or 6/16/20.    We believe that a strong preventative care program, including regular physicals and follow-up care is an important part of a healthy lifestyle and we are committed to helping you maintain your health.    Thank you for choosing us as your health care provider.    Sincerely,    Suzette Blackwell   LPN - CMT/CA  [facility]  [address]   [place] [address]  [phone]

## 2021-06-20 NOTE — LETTER
Letter by Aura Buck CNP at      Author: Aura Buck CNP Service: -- Author Type: --    Filed:  Encounter Date: 1/24/2020 Status: (Other)         Patient: Lana Lantigua   MR Number: [id number]   YOB: 1926   Date of Visit: 1/24/2020     Code Status:  DNR  Visit Type: Problem Visit (aspiration, hypoxia, Vit B 12 deficiency)     Facility:  [facility] [[id number]]        Facility Type: SNF (Skilled Nursing Facility, TCU)    History of Present Illness: Lana Lantigua is a 89 y.o. 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  She was treated with thrombolytics and started on xarelto.  She was transferred to a different TCU and later developed a LE DVT and was rehospitalized.  She was taken off the xarelto and placed on warfarin. She does have severe vascular dementia and is dependent in ADLs.     Today, nursing requested that I see her for a problem visit after an episode of hypoxia during therapy in which her O2 sats were in the 80s.  Nursing reports crackles auscultated in her lungs.  Upon exam she is calmly sitting in her room eating eggs.  While eating she does appear to pocket and continuously chew.  She does have some coughing during eating.  She is also on thin liquids.  Her upper lobes and her right LL is diminished, however it is difficult to auscultate as her dementia makes it difficult to direct her in deep inspiration.  Her O2 sats are no 95% on RA.  She continues to have pitting LE edema R>L. HR in the 90s and irregular    Review of Systems   ROS difficult to obtain due to patient's dementia.  Nursing denies issues with Bowels or bladder.         Physical Exam   Vital signs: /91, resp 16, HR 90s, 97.1  GENERAL APPEARANCE: Thin, elderly female in no acute distress.  HEENT: normocephalic, atraumatic  PERRL, sclerae anicteric, conjunctivae clear and moist, EOM intact  LUNGS:  upper lungs clear, diminished right base, no adventitious sounds, respiratory effort normal.  CARD:irregularly irregular,  S1, S2, without murmurs, gallops, rubs,  ABD: Soft and nontender with normal bowel sounds.   EXTREMITIES: +1 pitting edema BLE R>L  NEURO: Alert and cognitive impairment  SKIN: Inspection of the skin reveals no rashes, ulcerations or petechiae.  PSYCH: euthymic          Labs:    Recent Results (from the past 240 hour(s))   INR   Result Value Ref Range    INR 3.70 (!) 0.9 - 1.1   Basic Metabolic Panel   Result Value Ref Range    Sodium 142 136 - 145 mmol/L    Potassium 3.7 3.5 - 5.0 mmol/L    Chloride 106 98 - 107 mmol/L    CO2 28 22 - 31 mmol/L    Anion Gap, Calculation 8 5 - 18 mmol/L    Glucose 104 70 - 125 mg/dL    Calcium 8.5 8.5 - 10.5 mg/dL    BUN 16 8 - 28 mg/dL    Creatinine 0.72 0.60 - 1.10 mg/dL    GFR MDRD Af Amer >60 >60 mL/min/1.73m2    GFR MDRD Non Af Amer >60 >60 mL/min/1.73m2   INR   Result Value Ref Range    INR 3.00 (!) 0.9 - 1.1   INR   Result Value Ref Range    INR 2.50 (!) 0.9 - 1.1   INR   Result Value Ref Range    INR 4.00 (!) 0.9 - 1.1   INR   Result Value Ref Range    INR 4.50 (!) 0.9 - 1.1   INR   Result Value Ref Range    INR 3.00 (!) 0.9 - 1.1   INR   Result Value Ref Range    INR 2.40 (!) 0.9 - 1.1         Assessment:  1. Hypoxia     2. Coughing     3. Decreased breath sounds at right lung base     4. B12 deficiency     5. Acute deep vein thrombosis of lower leg, left (H)         Plan:   Will obtain chest xray to rule out aspiration.  Discussed with family the differential of PE vs aspiration.  If chest xray is clear will continue to monitor.  Explained to family that she is already on treatment so if not hypoxic would not do further diagnostics for PE workup.  Discussed her dysphagia and possible pocketing and family also noticing her coughing during eating.  Left msg for ST to continue to evaluate.  Albuterol nebs prn for an wheezing or shortness of breath.  Continue on Coumadin for DVT/CVA.  Coumadin clinic dosing.      I did do thorough chart review and found her last Vit B 12 injection was the beginning of December.  Instructed nursing to give today.  Recheck Vit B 12 level in 2 weeks and continue injections every 30 days.      I would suspect that patient would need memory care setting at discharge as she is quite dependent in transfers and ADLs.       35 total minutes spent with 20 minutes spent face to face with patient, family and nursing regarding current status and plan for recent hypoxic episode.      Electronically signed by: Aura Buck CNP

## 2021-06-20 NOTE — LETTER
Letter by Aura Buck CNP at      Author: Aura Buck CNP Service: -- Author Type: --    Filed:  Encounter Date: 1/6/2020 Status: Signed         Patient: Lana Lantigua   MR Number: [id number]   YOB: 1926   Date of Visit: 1/6/2020     Code Status:  DNR  Visit Type: Problem Visit (Lower extremity edema with redness and significant bruising)     Facility:  [facility] [[id number]]      Facility Type: SNF (Skilled Nursing Facility, TCU)    History of Present Illness:   Hospital Admission Date: 12/22/2019 Hospital Discharge Date: 12/27/2019      Lana Lantigua is a 89 y.o. female who has a past medical history for gastric cancer, hypertension, osteoporosis, vitamin B12 deficiency, Alzheimer's type dementia, and malnutrition.  She was recently hospitalized for right-sided weakness and aphasia and was found to have an acute left MCA ischemic stroke.  In the emergency room she did receive TPA and thrombectomy.  She was started on aspirin and atorvastatin and is to follow-up with neurology.  She does have right-sided hemiparesis with aphasia and was found to have dysphasia however her diet has been advanced and is currently on puréed diet.  She also developed atrial fibrillation with RVR and was started on metoprolol and apixaban.  She was found to have moderate aortic regurgitation and is to follow-up with cardiology.  Her A1c was 6.4% and she was started on metformin.    Today, nursing request that I see her for a problem visit due to significant lower extremity edema and blistering of her skin over the weekend.  Nursing reports that this has been getting worse progressively over the past 3 days.  Upon exam she has significant redness from her right ankle up to her knee which appears to be cellulitic.  She denies any calf tenderness however she does have a significant ecchymosis/erythema of her right posterior knee area.  She also has significant ecchymosis of her left shin.  There is  some blistering and puckering of the skin related to fluid.  She is on apixaban.    Past Medical History:   Diagnosis Date   ? B12 deficiency    ? Dementia (H) 12/11/2018   ? DNI (do not intubate) 10/17/2017   ? DNR (do not resuscitate) 10/17/2017   ? Gastric carcinoma (H)  1994   ? Nonsmoker    ? Osteoporosis    ? Venous insufficiency 8/14/2017   ? White coat hypertension        No current facility-administered medications for this visit.      Current Outpatient Medications   Medication Sig Dispense Refill   ? acetaminophen (TYLENOL) 500 MG tablet Take 1 tablet (500 mg total) by mouth every 6 (six) hours as needed for pain or fever.  0   ? apixaban (ELIQUIS) 2.5 mg Tab tablet Take 1 tablet (2.5 mg total) by mouth 2 (two) times a day. 60 tablet 0   ? aspirin 81 mg chewable tablet Chew 1 tablet (81 mg total) daily.  0   ? atorvastatin (LIPITOR) 40 MG tablet Take 1 tablet (40 mg total) by mouth at bedtime.  0   ? bisacodyl (DULCOLAX) 10 mg suppository Insert suppository rectally daily as needed for treatment of constipation.  0   ? melatonin 3 mg Tab tablet Take 3 mg by mouth at bedtime.     ? metFORMIN (FORTAMET) 500 MG (OSM) 24 hr tablet Take 1 tablet (500 mg total) by mouth daily with breakfast.     ? metoprolol tartrate (LOPRESSOR) 50 MG tablet Take 1 tablet (50 mg total) by mouth 2 (two) times a day.  0   ? polyethylene glycol (MIRALAX) 17 gram packet Take 1 packet (17 g total) by mouth daily as needed.  0   ? senna-docusate (PERICOLACE) 8.6-50 mg tablet Take 1 tablet by mouth 2 (two) times a day as needed for constipation. (Patient taking differently: Take 1 tablet by mouth 2 (two) times a day. )  0   ? traZODone (DESYREL) 50 MG tablet Take 25 mg by mouth at bedtime.     ? triamcinolone (KENALOG) 0.1 % cream Apply topically 2 (two) times a day. (Patient taking differently: Apply topically 2 (two) times a day. Apply to left lower extremity for dermatitis.) 454 g 2     Facility-Administered Medications Ordered in  Other Visits   Medication Dose Route Frequency Provider Last Rate Last Dose   ? heparin 25,000 units in 0.45% sodium chloride (100 units/ml) 250 mL infusion  1-50 Units/kg/hr Intravenous Continuous Glen Khoury, DO         No Known Allergies  Immunization History   Administered Date(s) Administered   ? Influenza high dose,seasonal,PF, 65+ yrs 10/17/2017, 10/04/2019   ? Influenza, inj, historic,unspecified 09/10/2009, 12/08/2013, 10/08/2018   ? Pneumo Polysac 23-V 12/01/2004       Review of Systems   To call to obtain due to patient's dementia.  She does deny any current pain or discomforts.  Nursing reports she is having bowel movements and urinating well.      Physical Exam   Vital signs: /65, heart rate 100, respiratory 16, temp 97.9.  GENERAL APPEARANCE: Thin, elderly female in no acute distress.  HEENT: normocephalic, atraumatic  PERRL, sclerae anicteric, conjunctivae clear and moist, EOM intact  LUNGS: Lung sounds CTA, no adventitious sounds, respiratory effort normal.  CARD: RRR, S1, S2, without murmurs, gallops, rubs,   ABD: Soft and nontender with normal bowel sounds.   MSK: Right-sided weakness lower extremity  EXTREMITIES: Significant edema with puckering and blistering of her skin to her lower extremities bilaterally.  Right lower extremity has significant redness and heat from her ankle to her knee.  She has ecchymosis/erythema to the posterior part of her right knee.  She has significant ecchymosis which is new to her left shin.  NEURO: Alert with obvious cognitive impairment.  Face is symmetric.  SKIN: See extremity  PSYCH: euthymic            Labs:    Recent Results (from the past 240 hour(s))   HM2(CBC w/o Differential)   Result Value Ref Range    WBC 11.1 (H) 4.0 - 11.0 thou/uL    RBC 3.38 (L) 3.80 - 5.40 mill/uL    Hemoglobin 9.3 (L) 12.0 - 16.0 g/dL    Hematocrit 30.7 (L) 35.0 - 47.0 %    MCV 91 80 - 100 fL    MCH 27.5 27.0 - 34.0 pg    MCHC 30.3 (L) 32.0 - 36.0 g/dL    RDW 14.6 (H)  11.0 - 14.5 %    Platelets 373 140 - 440 thou/uL    MPV 9.8 8.5 - 12.5 fL   Basic Metabolic Panel   Result Value Ref Range    Sodium 147 (H) 136 - 145 mmol/L    Potassium 4.1 3.5 - 5.0 mmol/L    Chloride 116 (H) 98 - 107 mmol/L    CO2 25 22 - 31 mmol/L    Anion Gap, Calculation 6 5 - 18 mmol/L    Glucose 133 (H) 70 - 125 mg/dL    Calcium 8.3 (L) 8.5 - 10.5 mg/dL    BUN 17 8 - 28 mg/dL    Creatinine 0.71 0.60 - 1.10 mg/dL    GFR MDRD Af Amer >60 >60 mL/min/1.73m2    GFR MDRD Non Af Amer >60 >60 mL/min/1.73m2         Assessment:  1. Localized edema     2. Cellulitis of right lower extremity     3. Abnormal bruising     4. Acute cerebrovascular accident (H)     5. Anticoagulated         Plan:    At this time her acute status cannot be managed in the facility as we need immediate diagnostic studies to be completed and she likely may need IV antibiotics for her cellulitis.  We will send her to the ER and recommend Doppler studies to her lower extremities and consideration of her apixaban.      Electronically signed by: Aura Buck CNP

## 2021-06-20 NOTE — LETTER
Letter by Johnson, Michael Duane, CNP at      Author: Johnson, Michael Duane, CNP Service: -- Author Type: --    Filed:  Encounter Date: 1/31/2020 Status: (Other)         Patient: Lana Lantigua   MR Number: [id number]   YOB: 1926   Date of Visit: 1/31/2020     [facility]    Facility:   [facility] [[id number]]   Code Status: DNR      CHIEF COMPLAINT/REASON FOR VISIT:  Chief Complaint   Patient presents with   ? Follow Up     rehab,        HISTORY:      HPI: Lana is a 89 y.o. female who I had the opportunity to revisit with secondary to her hospitalization most recently January 24through January 29, 2020 secondary to dyspnea and hypoxia.  She was diagnosed with acute on chronic diastolic congestive heart failure with a BNP of 1202 which was up from 692 from December 22, 2019.  The CAT scan did show bilateral pleural effusions of lower lobes.  The lower extremity edema did worsen.  She was hypoxic with 88% on room air.  Symptoms also seem to worsen after she was given fluids per sepsis protocol.  She was diuresed with furosemide.  Regarding her atrial fibrillation she did have a diltiazem bolus and continue with warfarin.  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  They can continue with the atorvastatin and aspirin.  She does have a history of dysphasia and I did request speech therapy to evaluate.  Has a history of recent left leg DVT.  The goals of care pretty much comfort.  With a history of hyponatremia the sodium level remained at 147.  Prior to that she was hospitalizedJanuary 6, 2020 through January 10, 2020 secondary to left lower extremity swelling and erythema.  The ultrasound left lower extremity on January 6 did show a DVT from the popliteal to the femoral vein.  She has been on Eliquis for atrial fibrillation switch to heparin in the setting of a new DVT was bridged to warfarin.  Regarding her atrial fibrillation.   Metoprolol was increased 75 mg twice daily rather than 50 mg twice daily.  She also did receive furosemide 40 mg x 2.  She was evaluated by dietitian secondary to calorie malnutrition.  Her sodium was elevated at 147 did resolve at discharge.  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  She also does have normocytic anemia likely of chronic disease.  She is now back in the transitional care unit we did have a chance to talk about that unfortunately due to cognition it was difficult to see if she really understood what we were talking about.  I had a chance to at least go over her medications.  The metformin was held in the hospital.  Her Accu-Cheks have been running 111-1 71.  Her legs actually pretty good today she does have a BMP and hemoglobin scheduled for today the results not yet back by this dictation.  Also speech therapy will be working with her.  She otherwise has been normotensive and afebrile and also on room air.  Past Medical History:   Diagnosis Date   ? B12 deficiency    ? Dementia (H) 12/11/2018   ? DNI (do not intubate) 10/17/2017   ? DNR (do not resuscitate) 10/17/2017   ? Gastric carcinoma (H)  1994   ? Nonsmoker    ? Osteoporosis    ? Venous insufficiency 8/14/2017   ? White coat hypertension              Family History   Problem Relation Age of Onset   ? Heart failure Father    ? Lung cancer Sister    ? Hypertension Mother    ? Varicose Veins Mother    ? Cancer Son          choriocarcinoma     Social History     Socioeconomic History   ? Marital status:      Spouse name: Not on file   ? Number of children: 7   ? Years of education: Not on file   ? Highest education level: Not on file   Occupational History   ? 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: RETIRED   Social Needs   ? Financial resource strain: Not on file   ? Food insecurity:     Worry: Not on file     Inability: Not on file   ? Transportation needs:     Medical: Not on file     Non-medical:  Not on file   Tobacco Use   ? Smoking status: Never Smoker   ? Smokeless tobacco: Never Used   Substance and Sexual Activity   ? Alcohol use: No   ? Drug use: No   ? Sexual activity: Not on file   Lifestyle   ? Physical activity:     Days per week: Not on file     Minutes per session: Not on file   ? Stress: Not on file   Relationships   ? Social connections:     Talks on phone: Not on file     Gets together: Not on file     Attends Confucianism service: Not on file     Active member of club or organization: Not on file     Attends meetings of clubs or organizations: Not on file     Relationship status: Not on file   ? Intimate partner violence:     Fear of current or ex partner: Not on file     Emotionally abused: Not on file     Physically abused: Not on file     Forced sexual activity: Not on file   Other Topics Concern   ? Not on file   Social History Narrative    Has resided in [place] in the past.  Generally has lived in [place], [place].  [place] in [place] at this time.        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        1/24/20: Lana resides at [facility] in [place].         Review of Systems  Currently no reports of fever chills fatigue cough or cold flulike symptoms nausea vomiting diarrhea dysuria flulike symptoms headache or stiff neck.  History of A. fib left MCA CVA gastric cancer heart failure, dementia, recent left leg DVT.     Current Outpatient Medications   Medication Sig   ? acetaminophen (TYLENOL) 500 MG tablet Take 1 tablet (500 mg total) by mouth every 6 (six) hours as needed for pain or fever.   ? albuterol (PROVENTIL) 2.5 mg /3 mL (0.083 %) nebulizer solution Take 2.5 mg by nebulization 4 (four) times a day as needed for wheezing.   ? aspirin 81 mg chewable tablet Chew 1 tablet (81 mg total) daily.   ? atorvastatin (LIPITOR) 40 MG tablet Take 1 tablet (40 mg total) by mouth at bedtime.   ? bisacodyl (DULCOLAX) 10 mg suppository Insert  suppository rectally daily as needed for treatment of constipation.   ? cyanocobalamin 1,000 mcg/mL injection Inject 1,000 mcg into the shoulder, thigh, or buttocks every 30 (thirty) days.   ? furosemide (LASIX) 20 MG tablet Take 1 tablet (20 mg total) by mouth daily.   ? melatonin 3 mg Tab tablet Take 3 mg by mouth at bedtime.   ? metFORMIN (FORTAMET) 500 MG (OSM) 24 hr tablet Take 1 tablet (500 mg total) by mouth 2 (two) times a day with meals.   ? metoprolol tartrate (LOPRESSOR) 50 MG tablet Take 50 mg by mouth 2 (two) times a day.   ? polyethylene glycol (MIRALAX) 17 gram packet Take 1 packet (17 g total) by mouth daily as needed.   ? senna-docusate (PERICOLACE) 8.6-50 mg tablet Take 1 tablet by mouth 2 (two) times a day as needed for constipation. (Patient taking differently: Take 1 tablet by mouth 2 (two) times a day. )   ? traZODone (DESYREL) 50 MG tablet Take 25 mg by mouth at bedtime.   ? triamcinolone (KENALOG) 0.1 % cream Apply topically 2 (two) times a day. (Patient taking differently: Apply topically 2 (two) times a day. Apply to left lower extremity for dermatitis.)   ? warfarin ANTICOAGULANT (COUMADIN/JANTOVEN) 1 MG tablet Take 0.5 tablets (0.5 mg total) by mouth daily. Adjust dose based on INR results.       There were no vitals filed for this visit.  Blood pressure 129/75 pulse 90 respirations 18 temperature 98.1 saturation room air 96%  Physical Exam   Head is normocephalic.  Neck is supple without adenopathy lungs are clear throughout.  Cardiovascular does have A. fib along with 3/6 MAGDIEL.  Lower extremity edema.  Left greater than right.  Left leg with a DVT.  Musculoskeletal generalized weakness.   Denies pain.  Psychiatric Pleasant but does have cognitive impairment.   LABS:   Lab Results   Component Value Date    WBC 9.5 01/28/2020    HGB 9.0 (L) 01/28/2020    HCT 30.5 (L) 01/28/2020    MCV 85 01/28/2020     01/28/2020     Results for orders placed or performed during the hospital  encounter of 01/24/20   Basic Metabolic Panel   Result Value Ref Range    Sodium 140 136 - 145 mmol/L    Potassium 3.7 3.5 - 5.0 mmol/L    Chloride 103 98 - 107 mmol/L    CO2 30 22 - 31 mmol/L    Anion Gap, Calculation 7 5 - 18 mmol/L    Glucose 107 70 - 125 mg/dL    Calcium 8.0 (L) 8.5 - 10.5 mg/dL    BUN 22 8 - 28 mg/dL    Creatinine 0.73 0.60 - 1.10 mg/dL    GFR MDRD Af Amer >60 >60 mL/min/1.73m2    GFR MDRD Non Af Amer >60 >60 mL/min/1.73m2       Lab Results   Component Value Date    ALT 66 (H) 01/24/2020    AST 60 (H) 01/24/2020    ALKPHOS 124 (H) 01/24/2020    BILITOT 0.9 01/24/2020         ASSESSMENT:      ICD-10-CM    1. Chronic systolic congestive heart failure (H) I50.22    2. History of CVA (cerebrovascular accident) Z86.73    3. Dementia without behavioral disturbance, unspecified dementia type (H) F03.90    4. Severe protein-calorie malnutrition (H) E43        PLAN:    Changing up the Accu-Cheks to twice a day rather than 3 times a day she is not even taking medication yet hope is perhaps we can keep her off the Metformin.  Appetite is only fair at best.  Does need assistance with all ADLs.  She does not appear to be uncomfortable.  Also did have the pleasure of talking with her about her pictures in the room and she was a much younger female in her  outfit as well as her nursing office.  Staff will keep me updated for any other issues.    Electronically signed by: Michael Duane Johnson, KRYSTAL

## 2021-06-21 NOTE — PROGRESS NOTES
BP Readings from Last 20 Encounters:   11/12/18 170/60   11/10/18 138/90   11/07/18 140/66   11/05/18 179/80   10/08/18 128/60   06/20/18 156/54   06/04/18 146/82   02/22/18 160/66   10/17/17 136/78   08/14/17 150/78   07/26/17 116/68   07/12/17 118/76   07/05/17 142/74   06/26/17 136/76   06/18/17 (!) 185/84   06/23/16 144/60   05/04/15 146/40

## 2021-06-21 NOTE — PROGRESS NOTES
"TCM DISCHARGE FOLLOW UP CALL    Discharge Date:  11/5/2018  Reason for hospital stay (discharge diagnosis)::  Ischemic Cerebrovascular Accident (CVA)  Are you feeling better, the same or worse since your discharge?:  Patient is feeling better  Do you feel like you have a plan in the event of a health emergency?: Yes (\"Call Dr. Park\")    As part of your discharge plan, were  home care services ordered for you?: Yes    Have you seen them yet, or are they scheduled to visit?: Yes    Do you have any follow up visits scheduled with your PCP or Specialist?:  Yes, with PCP (Dr. Park 11/12/18)  (RN) Is PCP appt scheduled soon enough (within 14 days of discharge date)?: Yes        Eva Leiva RN Care Manager, Population Health    "

## 2021-06-25 NOTE — PROGRESS NOTES
Progress Notes by George Park MD at 6/26/2017 10:30 AM     Author: George Park MD Service: -- Author Type: Physician    Filed: 7/4/2017  9:17 PM Encounter Date: 6/26/2017 Status: Signed    : George Park MD (Physician)              [facility]    Comprehensive and complex medical care - Chronic disease management - Shared decision making - Care coordination - Compassionate care    Patient advocacy - Rational deprescribing - Minimally disruptive medicine - Ethical focus - Customized care    Date of Service: 6/26/2017  Primary Provider: George Park MD    Patient Care Team:  George Park MD as PCP - General (Internal Medicine)  Inocente Rivera MD as Physician (Ophthalmology)     ______________________________________________________________________     Patient's Pharmacy:    [id number] - [place], [place] - [address] [facility]  [address] [facility] [address]  Phone: [phone] Fax: [phone]    [facility] [address] [place] [address] [address] AT [facility] [address]  [address]  [facility] [address]  Phone: [phone] Fax: [phone]     Patient's Insurance:    Payor: MEDICARE / Plan: MEDICARE A AND B / Product Type: Medicare /     ______________________________________________________________________      Routine physical - female, age 65 and older.    Lana Lantigua is a 89 y.o. female coming in today for a routine physical.  She does have other issues outside the physical to discuss as well.    Past Medical History:   Diagnosis Date   ? B12 deficiency    ? Gastric carcinoma  1994   ? Nonsmoker    ? Osteoporosis    ? White coat hypertension      Social History     Social History   ? Marital status:      Spouse name: N/A   ? Number of children: 7   ? Years of education: N/A     Occupational History   ? Homemaker and nursing Retired     Social History  Main Topics   ? Smoking status: Never Smoker   ? Smokeless tobacco: None   ? Alcohol use No   ? Drug use: None   ? Sexual activity: Not Asked     Other Topics Concern   ? None     Social History Narrative    Has resided in [place] in the past.  Generally has lived in [place], [place].  [place] in [place] at this time.        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     Family History   Problem Relation Age of Onset   ? Heart failure Father    ? Lung cancer Sister    ? Hypertension Mother    ? Cancer Son       choriocarcinoma     Family history is otherwise noncontributory.    Current Outpatient Prescriptions   Medication Sig   ? calcium carbonate (OS-CIERA) 600 mg (1,500 mg) tablet Take 600 mg by mouth 2 (two) times a day with meals.   ? cholecalciferol, vitamin D3, (VITAMIN D3) 2,000 unit Tab Take by mouth daily.   ? cyanocobalamin 1,000 mcg/mL injection INJECT 1 ML IN THE MUSCLE ONCE MONTHLY   ? multivitamin with minerals (THERA-M) 9 mg iron-400 mcg Tab tablet Take 1 tablet by mouth daily.     Immunization History   Administered Date(s) Administered   ? Influenza, inj, historic 09/10/2009, 12/08/2013   ? Pneumo Polysac 23-V 12/01/2004     ______________________________________________________________________    History of present illness:    The patient comes in today for routine physical and for other issues.  Next    She is first in today for follow-up of her left leg cellulitis.  She was seen in the emergency room for this.  She had a sore in her leg.  She went to [place] this winter, but they likely won't be going again next year.  She to take the antibiotics and there still a bit of redness around the sore itself, and there is swelling which she has had off and on in the past.  The swelling in the left leg has been more prominent over the last 3 months.  Her breathing seems to be okay according to her.    We reviewed her memory issues.  She's been noticing that she is been having more  "memory problems.  She denies any gait issues and denies any urinary incontinence issues.    She's been noticing that she bruises easily.    Reviewed her history of gastric carcinoma and she's had no recent issues with this.    Her relationship to her white coat hypertension she's had no elevated blood pressure as of late.    Reviewed her B12 deficiency.  She's been getting shots for a long time.  We talked about maybe a trial to oral medications.  She should take 512,000  g of vitamin B12 a day and we reviewed this with her.    We reviewed her CODE STATUS with her and advance directives and she will look into this but does not want to make a decision about this today.     The 10-system review of systems and health history form [facility] was completed by patient, reviewed by me, and pertinent problems are reviewed above.  ______________________________________________________________________     Exam:  Wt Readings from Last 3 Encounters:   06/26/17 (!) 96 lb (43.5 kg)   06/18/17 (!) 90 lb (40.8 kg)   06/23/16 (!) 88 lb 3.2 oz (40 kg)     BP Readings from Last 3 Encounters:   06/26/17 136/76   06/18/17 (!) 185/84   06/23/16 144/60     /76  Pulse 82  Ht 4' 11\" (1.499 m)  Wt (!) 96 lb (43.5 kg)  BMI 19.39 kg/m2    The patient is in no acute distress.  Mood good.  Insight fair .  No skin lesions or nodules of concern.  Ears are clear.  Nose is clear.  Throat is clear.  Neck is supple  and there is no thyromegaly. No cervical, epitrochlear or axillary adenopathy.  Heart regular rate and rhythm.  There is no murmur present.  Lungs clear to auscultation bilaterally.  Respiratory effort is good.  Breast exam shows no nodules and no skin changes.  Abdomen is soft, nontender.  No hepatosplenomegaly.  No hernia appreciated.  Edema - 2+  In the left leg.  There is a about 4 cm superficial sore over the posterolateral leg.  On the left.  There is no active cellulitis noted at this time. Neuro exam shows normal " strength in all muscle groups and normal reflexes.      ______________________________________________________________________    Diagnostics:    Results for orders placed or performed in visit on 06/26/17   HM2(CBC w/o Differential)   Result Value Ref Range    WBC 5.8 4.0 - 11.0 thou/uL    RBC 3.78 (L) 3.80 - 5.40 mill/uL    Hemoglobin 10.0 (L) 12.0 - 16.0 g/dL    Hematocrit 31.4 (L) 35.0 - 47.0 %    MCV 83 80 - 100 fL    MCH 26.6 (L) 27.0 - 34.0 pg    MCHC 31.9 (L) 32.0 - 36.0 g/dL    RDW 13.8 11.0 - 14.5 %    Platelets 278 140 - 440 thou/uL    MPV 7.4 7.0 - 10.0 fL   Iron and Transferrin Iron Binding Capacity   Result Value Ref Range    Iron 33 (L) 42 - 175 ug/dL    Transferrin 321 212 - 360 mg/dL    Transferrin Saturation, Calculated 8 (L) 20 - 50 %    Transferrin IBC, Calculated 402 313 - 563 ug/dL   Ferritin   Result Value Ref Range    Ferritin 8 (L) 10 - 130 ng/mL   Vitamin B12   Result Value Ref Range    Vitamin B-12 346 213 - 816 pg/mL   Hepatic Profile   Result Value Ref Range    Bilirubin, Total 0.4 0.0 - 1.0 mg/dL    Bilirubin, Direct 0.2 <=0.5 mg/dL    Protein, Total 6.4 6.0 - 8.0 g/dL    Albumin 3.7 3.5 - 5.0 g/dL    Alkaline Phosphatase 91 45 - 120 U/L    AST 28 0 - 40 U/L    ALT 16 0 - 45 U/L      ______________________________________________________________________     Assessment:    1. Routine physical examination    2. Normocytic anemia    3. B12 deficiency    4. Left leg swelling    5. Wound, open, leg    6. Left leg cellulitis    7. Memory loss         ______________________________________________________________________     ______________________________________________________________________    QUALITY REVIEW      Health Maintenance Due   Topic Date Due   ? ADVANCE DIRECTIVES DISCUSSED WITH PATIENT  02/28/1944   ? ZOSTER VACCINE  02/28/1986   ? PNEUMOCOCCAL CONJUGATE VACCINE FOR ADULTS (PCV13 OR PREVNAR)  02/28/1991   ? FALL RISK ASSESSMENT  02/28/1991       History   Smoking Status   ?  Never Smoker   Smokeless Tobacco   ? Not on file        PHQ-2 Total Score: 0 (7/4/2017  9:00 PM)  Depression Follow-up Plan: patient follow-up to return when and if necessary (7/4/2017  9:00 PM)  No Data Recorded     ______________________________________________________________________       Plan:    1. There is no signs of active infection in the leg at this time.  2. I am sending her on to the vascular Center for her wound evaluation and care.  I do not have enough quick follow-up in my schedule to adequately manage this problem.  3. We will likely have to have her come back for follow-up of her memory issues.  4. She is going to try over-the-counter vitamin B12 to see if this helps.  5. She has evidence of iron deficiency likely due to her total gastrectomy.  We should probably have her do stool tests if she is willing.  We will want to follow up with her related to this.  This will be communicated with her.  6. Likely will want to have family members in with her next visit.    George Park MD  General Internal Medicine  [facility]    Return in about 2 months (around 8/26/2017) for follow up visit.

## 2021-06-25 NOTE — PROGRESS NOTES
Progress Notes by George Park MD at 10/17/2017  2:15 PM     Author: George Park MD Service: -- Author Type: Physician    Filed: 10/17/2017 11:13 PM Encounter Date: 10/17/2017 Status: Signed    : George Park MD (Physician)              [facility]    Comprehensive and complex medical care - Chronic disease management - Shared decision making - Care coordination - Compassionate care    Patient advocacy - Rational deprescribing - Minimally disruptive medicine - Ethical focus - Customized care    Date of Service: 10/17/2017  Primary Provider: George Park MD    Patient Care Team:  George Park MD as PCP - General (Internal Medicine)  Inocente Rivera MD as Physician (Ophthalmology)     ______________________________________________________________________     Patient's Pharmacy:    [facility] [id number] [place] [address] [address] AT [facility] [address]  [address]  [facility] [address]  Phone: [phone] Fax: [phone]     Patient's Insurance:    Payor: MEDICARE / Plan: MEDICARE A AND B / Product Type: Medicare /     ______________________________________________________________________     Lana Lantigua is 89 y.o. female who comes in today for:    Chief Complaint   Patient presents with   ? Follow-up     iron   ? medication     not doing the B 12 injection taking the pill       Patient Active Problem List   Diagnosis   ? Gastric carcinoma   ? White coat hypertension   ? Osteoporosis   ? B12 deficiency   ? Nonsmoker   ? Venous insufficiency   ? DNR (do not resuscitate)   ? DNI (do not intubate)     Current Outpatient Prescriptions   Medication Sig   ? calcium carbonate (OS-CIERA) 600 mg (1,500 mg) tablet Take 500 mg by mouth daily.    ? cholecalciferol, vitamin D3, (VITAMIN D3) 2,000 unit Tab Take by mouth daily.   ? ferrous sulfate 324 mg (65 mg iron) TbEC Take 1 tablet by mouth 2 (two) times a day with  meals.   ? multivitamin with minerals (THERA-M) 9 mg iron-400 mcg Tab tablet Take 1 tablet by mouth daily.   ? triamcinolone (KENALOG) 0.1 % cream Apply topically 2 (two) times a day.     Social History     Social History Narrative    Has resided in [place] in the past.  Generally has lived in [place], [place].  [place] in [place] at this time.        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     ______________________________________________________________________     History of present illness:    The patient comes in today with her daughter Noemy.    We first reviewed her iron deficiency anemia.  We reviewed her negative stool tests.  We reviewed still a possibility of underlying GI issues.  The daughter is aware of this.  The patient does not want to do further testing in relationship to this at this time.  She has been taking iron twice a day at this time.  Her stools have been black as a result.  She's not noticed any blood in her stool.  We are going to be rechecking on this at this time.  The etiology behind the iron loss is likely due to her gastric resection in the past.    Reviewed her B12 deficiency.  She used to give herself shots, but doesn't want to do so anymore.  We reviewed this with her.  She would like to have us start doing this rather than taking pills of B12.  She will get her shot today and schedule future shots.    We reviewed her venous insufficiency and venous stasis and her sore is doing well at this time and she's not had any significant swelling in her leg at this point.    Reviewed CODE STATUS with her and she wishes to be DNR/DNI.  Her daughter Noemy agrees and she also has another son Jayme who would be involved with decision making if needed.  She doesn't have an advanced directive but might be able to bring this in the future.    She's had no symptoms of recurrent gastric carcinoma.    She declines zoster vaccine.    She does have some memory issues and has  he hard time remembering appointments.  The daughter does not feel that her memory issue is too much of an issue yet at this time.    On review of systems, the patient denies any chest pain or shortness of breath.    ______________________________________________________________________    Exam:    Wt Readings from Last 3 Encounters:   10/17/17 (!) 89 lb (40.4 kg)   08/14/17 (!) 90 lb 9.6 oz (41.1 kg)   06/26/17 (!) 96 lb (43.5 kg)     BP Readings from Last 3 Encounters:   10/17/17 136/78   08/14/17 150/78   07/26/17 116/68     /78  Pulse 83  Wt (!) 89 lb (40.4 kg)  SpO2 97%  BMI 17.98 kg/m2   The patient is comfortable, no acute distress.  Mood good.  Insight fair.  Eyes are nonicteric.  Neck is supple without mass.  No cervical adenopathy.  No thyromegaly. Heart regular rate and rhythm.  Lungs clear to auscultation bilaterally.  Respiratory effort is good.  Abdomen soft and nontender.  No hepatosplenomegaly.  Extremities no edema.      ______________________________________________________________________    Diagnostics:    Results for orders placed or performed in visit on 10/17/17   Ferritin   Result Value Ref Range    Ferritin 23 10 - 130 ng/mL   Iron and Transferrin Iron Binding Capacity   Result Value Ref Range    Iron 168 42 - 175 ug/dL    Transferrin 282 212 - 360 mg/dL    Transferrin Saturation, Calculated 48 20 - 50 %    Transferrin IBC, Calculated 353 313 - 563 ug/dL   HM2(CBC w/o Differential)   Result Value Ref Range    WBC 6.3 4.0 - 11.0 thou/uL    RBC 4.36 3.80 - 5.40 mill/uL    Hemoglobin 12.8 12.0 - 16.0 g/dL    Hematocrit 38.6 35.0 - 47.0 %    MCV 89 80 - 100 fL    MCH 29.3 27.0 - 34.0 pg    MCHC 33.1 32.0 - 36.0 g/dL    RDW 16.7 (H) 11.0 - 14.5 %    Platelets 189 140 - 440 thou/uL    MPV 7.9 7.0 - 10.0 fL      ______________________________________________________________________    Assessment:    1. FAVIOLA (iron deficiency anemia)    2. Need for influenza vaccination    3. B12 deficiency     4. Gastric carcinoma    5. DNR (do not resuscitate)    6. DNI (do not intubate)       ______________________________________________________________________      PHQ-2 Total Score: 0 (7/4/2017  9:00 PM)  Depression Follow-up Plan: patient follow-up to return when and if necessary (7/4/2017  9:00 PM)  No Data Recorded    Plan:    1. Check blood work today.   2. Iron level is looking better and we will likely have her reduce her iron to one pill a day.  3. B12 shot done today and can do every 4-6 weeks at this point.  4. Follow-up again with me in the next 6 months.  5. DNR CODE STATUS.    George Park MD  General Internal Medicine  [facility]     Return in about 6 months (around 4/17/2018) for visit and blood work.

## 2021-06-25 NOTE — PROGRESS NOTES
Progress Notes by George Park MD at 8/14/2017 10:05 AM     Author: George Park MD Service: -- Author Type: Physician    Filed: 8/14/2017  4:44 PM Encounter Date: 8/14/2017 Status: Signed    : George Park MD (Physician)              [facility]    Comprehensive and complex medical care - Chronic disease management - Shared decision making - Care coordination - Compassionate care    Patient advocacy - Rational deprescribing - Minimally disruptive medicine - Ethical focus - Customized care    Date of Service: 8/14/2017  Primary Provider: George Park MD    Patient Care Team:  George Park MD as PCP - General (Internal Medicine)  Inocente Rivera MD as Physician (Ophthalmology)     ______________________________________________________________________     Patient's Pharmacy:    [facility] [id number] [place] [address] [address] AT [facility] [address]  [address]  [facility] [address]  Phone: [phone] Fax: [phone]     Patient's Insurance:    Payor: MEDICARE / Plan: MEDICARE A AND B / Product Type: Medicare /     ______________________________________________________________________     Lana Lantigua is 89 y.o. female who comes in today for:    Chief Complaint   Patient presents with   ? Follow-up     hgb and lab work        Patient Active Problem List   Diagnosis   ? Gastric carcinoma   ? White coat hypertension   ? Osteoporosis   ? B12 deficiency   ? Nonsmoker   ? Venous insufficiency     Current Outpatient Prescriptions   Medication Sig   ? calcium carbonate (OS-CIERA) 600 mg (1,500 mg) tablet Take 500 mg by mouth daily.    ? cholecalciferol, vitamin D3, (VITAMIN D3) 2,000 unit Tab Take by mouth daily.   ? multivitamin with minerals (THERA-M) 9 mg iron-400 mcg Tab tablet Take 1 tablet by mouth daily.   ? triamcinolone (KENALOG) 0.1 % cream Apply topically 2 (two) times a day.     Social History     Social  "History Narrative    Has resided in [place] in the past.  Generally has lived in [place], [place].  [place] in [place] at this time.        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     ______________________________________________________________________     History of present illness:    The patient comes in today for follow-up of number issues.    Reviewed her stasis dermatitis.  She was given some triamcinolone cream by the wound clinic and this was helpful for her.  She does continue to have issues with this especially on the left leg and she would like a refill of this.  This will be done today.  She was encouraged to use it regularly.    We reviewed her iron deficiency anemia.  She has not noticed any bloody stools or black stools.  She did have stool workup which was negative for blood.  She is not interested in colonoscopy or EGD at this time.  I feel that she is competent enough to make this decision.    We reviewed her venous insufficiency and she still has some swelling in her left leg.  This is not worse.  Her cellulitis is doing a lot better at this point.    Reviewed her memory issues.  These have been worse over the last year for her.  Her recent blood work was reviewed.    On review of systems, the patient denies any chest pain or shortness of breath.    ______________________________________________________________________    Exam:    Wt Readings from Last 3 Encounters:   08/14/17 (!) 90 lb 9.6 oz (41.1 kg)   06/26/17 (!) 96 lb (43.5 kg)   06/18/17 (!) 90 lb (40.8 kg)     BP Readings from Last 3 Encounters:   08/14/17 150/78   07/26/17 116/68   07/12/17 118/76     /78  Pulse 100  Ht 4' 11\" (1.499 m)  Wt (!) 90 lb 9.6 oz (41.1 kg)  BMI 18.3 kg/m2   The patient is comfortable, no acute distress.  Mood good.  Insight fair.  Eyes are nonicteric.  Neck is supple without mass.  No cervical adenopathy.  No thyromegaly. Heart regular rate and rhythm.  Lungs clear to " auscultation bilaterally.  Respiratory effort is good.  Abdomen soft and nontender.  No hepatosplenomegaly.  Extremities 1+ left lower extremity edema.      ______________________________________________________________________    Diagnostics:    Results for orders placed or performed in visit on 08/14/17   HM2(CBC w/o Differential)   Result Value Ref Range    WBC 5.2 4.0 - 11.0 thou/uL    RBC 3.80 3.80 - 5.40 mill/uL    Hemoglobin 10.0 (L) 12.0 - 16.0 g/dL    Hematocrit 31.3 (L) 35.0 - 47.0 %    MCV 82 80 - 100 fL    MCH 26.3 (L) 27.0 - 34.0 pg    MCHC 32.0 32.0 - 36.0 g/dL    RDW 13.6 11.0 - 14.5 %    Platelets 219 140 - 440 thou/uL    MPV 7.0 7.0 - 10.0 fL      ______________________________________________________________________    Assessment:    1. FAVIOLA (iron deficiency anemia)    2. Stasis dermatitis    3. Venous insufficiency    4. Memory loss       ______________________________________________________________________      Lab Results   Component Value Date    LDLCALC 76.0 08/30/2010       PHQ-2 Total Score: 0 (7/4/2017  9:00 PM)  Depression Follow-up Plan: patient follow-up to return when and if necessary (7/4/2017  9:00 PM)  No Data Recorded    Plan:    Refilled patient's triamcinolone cream.  Check blood work today to see where her iron levels are at.  She has been on iron infusions in the past.  This was about 5 years ago.  Follow-up to be determined based on blood results.    George Park MD  General Internal Medicine  [facility]     Return if symptoms worsen or fail to improve.

## 2021-06-26 NOTE — PROGRESS NOTES
Progress Notes by George Park MD at 6/4/2018 10:55 AM     Author: George Park MD Service: -- Author Type: Physician    Filed: 6/4/2018  1:23 PM Encounter Date: 6/4/2018 Status: Signed    : George Park MD (Physician)              [facility]    Comprehensive and complex medical care - Chronic disease management - Shared decision making - Care coordination - Compassionate care    Patient advocacy - Rational deprescribing - Minimally disruptive medicine - Ethical focus - Customized care    ______________________________________________________________________     Date of Service: 6/4/2018  Primary Provider: George Park MD    Patient Care Team:  George Park MD as PCP - General (Internal Medicine)  Inocente Rivera MD as Physician (Ophthalmology)     ______________________________________________________________________     Patient's Pharmacy:    [facility] [id number] [place] [address] [address] AT [facility] [address]  [address]  [facility] [address]  Phone: [phone] Fax: [phone]     Patient's Contacts:  Name Home Phone Work Phone Mobile Phone Relationship Lg CASH Brown   [phone] Spouse      Patient's Insurance:    Payor: MEDICARE / Plan: MEDICARE A AND B / Product Type: Medicare /     ______________________________________________________________________     Lana E Schaal is 89 y.o. female who comes in today for:    Chief Complaint   Patient presents with   ? Follow-up     memory loss   ? Labs Only     iron       Patient Active Problem List   Diagnosis   ? Gastric carcinoma   ? White coat hypertension   ? Osteoporosis   ? B12 deficiency   ? Nonsmoker   ? Venous insufficiency   ? DNR (do not resuscitate)   ? DNI (do not intubate)     Current Outpatient Prescriptions   Medication Sig   ? calcium carbonate (OS-CIERA) 600 mg (1,500 mg) tablet Take 500 mg by mouth daily.    ? cholecalciferol,  vitamin D3, (VITAMIN D3) 2,000 unit Tab Take by mouth daily.   ? ferrous sulfate 324 mg (65 mg iron) TbEC Take 1 tablet by mouth 2 (two) times a day with meals.   ? multivitamin with minerals (THERA-M) 9 mg iron-400 mcg Tab tablet Take 1 tablet by mouth daily.   ? triamcinolone (KENALOG) 0.1 % cream Apply topically 2 (two) times a day.     Social History     Social History Narrative    Has resided in [place] in the past.  Generally has lived in [place], [place].  [place] in [place] at this time.        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     ______________________________________________________________________     History of present illness:    Patient comes in today with her daughter.    We reviewed her iron deficiency anemia.  She is off of iron at this time.  We are going to follow-up on this.  She has not wanted workup for this previously.  This was reviewed with her in detail today.    We reviewed her elevated blood pressure with history of white coat syndrome.  We did recheck it today and it has been doing better.    We reviewed her memory issues.  She has had increasing problems with short-term memory in the last year.  The daughter has known it.  The patient does not cook.  She is not particularly at risk.  They are going to look into assisted living in the near future to look at other options.  Her kids check on the patient and her father quite a bit as they are close in the area.    We reviewed her venous insufficiency and she is not using her stockings at this time.  She has not had any issues with excessive swelling.    Reviewed her gastric carcinoma and she has had no major issues related to this.    On review of systems, the patient denies any chest pain or shortness of breath.  She does note some dizziness when closing her eyes in the shower.    ______________________________________________________________________    Exam:    Wt Readings from Last 3 Encounters:   06/04/18  (!) 90 lb (40.8 kg)   02/22/18 (!) 91 lb 6.4 oz (41.5 kg)   10/17/17 (!) 89 lb (40.4 kg)     BP Readings from Last 3 Encounters:   06/04/18 146/82   02/22/18 160/66   10/17/17 136/78     /82  Pulse 68  Wt (!) 90 lb (40.8 kg)  BMI 18.18 kg/m2   The patient is comfortable, no acute distress.  Mood good.  Insight fair, but she does not speak a lot.  Eyes are nonicteric.  Neck is supple without mass.  No cervical adenopathy.  No thyromegaly. Heart regular rate and rhythm.  Lungs clear to auscultation bilaterally.  Respiratory effort is good.  Abdomen soft and nontender.  No hepatosplenomegaly.  Extremities no edema.  There are varicosities of the lower extremities noted.      ______________________________________________________________________    Diagnostics:    Results for orders placed or performed in visit on 06/04/18   HM2(CBC w/o Differential)   Result Value Ref Range    WBC 6.4 4.0 - 11.0 thou/uL    RBC 3.87 3.80 - 5.40 mill/uL    Hemoglobin 12.6 12.0 - 16.0 g/dL    Hematocrit 37.9 35.0 - 47.0 %    MCV 98 80 - 100 fL    MCH 32.5 27.0 - 34.0 pg    MCHC 33.2 32.0 - 36.0 g/dL    RDW 10.3 (L) 11.0 - 14.5 %    Platelets 168 140 - 440 thou/uL    MPV 8.2 7.0 - 10.0 fL      ______________________________________________________________________    Assessment:    1. FAVIOLA (iron deficiency anemia)    2. Venous insufficiency    3. Gastric carcinoma    4. Memory loss      ______________________________________________________________________      PHQ-2 Total Score: 0 (2/22/2018  3:00 PM)  Depression Follow-up Plan: patient follow-up to return when and if necessary (7/4/2017  9:00 PM)  No Data Recorded  ______________________________________________________________________    Plan:    1. Blood work done today.  2. Continue B12 shots.  3. Discussed memory medications and they opt not to proceed with this.  4. No further workup needed at this point for this.  5. Encouraged them to look into assisted living issues for the  future.    George Park MD  General Internal Medicine  [facility]     Return in about 6 months (around 12/4/2018), or if symptoms worsen or fail to improve.     Future Appointments  Date Time Provider Department Center   7/3/2018 10:00 AM [facility]   8/2/2018 10:00 AM [facility]   9/4/2018 10:00 AM [facility]         ______________________________________________________________________     Relevant ICD-10 codes and order associations:      ICD-10-CM    1. FAVIOLA (iron deficiency anemia) D50.9 Iron and Transferrin Iron Binding Capacity     Ferritin     HM2(CBC w/o Differential)   2. Venous insufficiency I87.2 Basic Metabolic Panel   3. Gastric carcinoma C16.9    4. Memory loss R41.3

## 2021-06-26 NOTE — PROGRESS NOTES
Progress Notes by George Park MD at 2/22/2018  2:55 PM     Author: George Park MD Service: -- Author Type: Physician    Filed: 2/25/2018 12:43 PM Encounter Date: 2/22/2018 Status: Signed    : George Park MD (Physician)              [facility]    Comprehensive and complex medical care - Chronic disease management - Shared decision making - Care coordination - Compassionate care    Patient advocacy - Rational deprescribing - Minimally disruptive medicine - Ethical focus - Customized care    ______________________________________________________________________     Date of Service: 2/22/2018  Primary Provider: George Park MD    Patient Care Team:  George Park MD as PCP - General (Internal Medicine)  Inocente Rivera MD as Physician (Ophthalmology)     ______________________________________________________________________     Patient[facility] Pharmacy:    [email or web address] [id number] [place] [address] [address] AT [facility] [address]  [address]  [facility] [address]  Phone: [phone] Fax: [phone]     Patient's Contacts:  Name Home Phone Work Phone Mobile Phone Relationship Lg CASH Brown   [phone] Spouse      Patient's Insurance:    Payor: MEDICARE / Plan: MEDICARE A AND B / Product Type: Medicare /     ______________________________________________________________________     Lana E Schaal is 89 y.o. female who comes in today for:    Chief Complaint   Patient presents with   ? Labs Only     vit d   ? Follow-up       Patient Active Problem List   Diagnosis   ? Gastric carcinoma   ? White coat hypertension   ? Osteoporosis   ? B12 deficiency   ? Nonsmoker   ? Venous insufficiency   ? DNR (do not resuscitate)   ? DNI (do not intubate)     Current Outpatient Prescriptions   Medication Sig   ? cholecalciferol, vitamin D3, (VITAMIN D3) 2,000 unit Tab Take by mouth daily.   ? ferrous sulfate 324 mg (65 mg  iron) TbEC Take 1 tablet by mouth 2 (two) times a day with meals.   ? multivitamin with minerals (THERA-M) 9 mg iron-400 mcg Tab tablet Take 1 tablet by mouth daily.   ? calcium carbonate (OS-CIERA) 600 mg (1,500 mg) tablet Take 500 mg by mouth daily.    ? triamcinolone (KENALOG) 0.1 % cream Apply topically 2 (two) times a day.     Social History     Social History Narrative    Has resided in [place] in the past.  Generally has lived in [place], [place].  [place] in [place] at this time.        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     ______________________________________________________________________     History of present illness:    Patient comes in today with her daughter Noemy.    Reviewed the patient's issues with B12 today as well.  Has been getting her B12 shots regularly.    We reviewed her venous insufficiency and she uses her stockings and this seems to help with her swelling.  She is doing well related to this.    We are rechecking on the anemia again as well at this visit.   She has been taking 1 iron pill a day.  She would like to go off of it if possible.  We might need to monitor it a little closer.    She Has Had Progressive Issues with Memory Loss.  We Reviewed This with Her Daughter They are not interested in further testing or medication.    She has had elevated blood pressure consistent with white coat hypertension in the past.  She is not interested in further medication at this time.    We reviewed her other issues noted in the assessment but not specifically addressed in the HPI above.     On review of systems, the patient denies any chest pain or shortness of breath.    ______________________________________________________________________    Exam:    Wt Readings from Last 3 Encounters:   02/22/18 (!) 91 lb 6.4 oz (41.5 kg)   10/17/17 (!) 89 lb (40.4 kg)   08/14/17 (!) 90 lb 9.6 oz (41.1 kg)     BP Readings from Last 3 Encounters:   02/22/18 160/66   10/17/17  136/78   08/14/17 150/78     /66  Pulse 61  Wt (!) 91 lb 6.4 oz (41.5 kg)  SpO2 98%  BMI 18.46 kg/m2   The patient is comfortable, no acute distress.  Mood good.  Insight fair - has lost cognition over the last 2 years.  Eyes are nonicteric.  Neck is supple without mass.  No cervical adenopathy.  No thyromegaly. Heart regular rate and rhythm.  Lungs clear to auscultation bilaterally.  Respiratory effort is good.  Abdomen soft and nontender.  No hepatosplenomegaly.  Extremities trace edema.      ______________________________________________________________________    Diagnostics:    Results for orders placed or performed in visit on 02/22/18   Ferritin   Result Value Ref Range    Ferritin 28 10 - 130 ng/mL   Iron and Transferrin Iron Binding Capacity   Result Value Ref Range    Iron 70 42 - 175 ug/dL    Transferrin 238 212 - 360 mg/dL    Transferrin Saturation, Calculated 23 20 - 50 %    Transferrin IBC, Calculated 298 (L) 313 - 563 ug/dL   HM2(CBC w/o Differential)   Result Value Ref Range    WBC 6.9 4.0 - 11.0 thou/uL    RBC 3.86 3.80 - 5.40 mill/uL    Hemoglobin 12.5 12.0 - 16.0 g/dL    Hematocrit 36.8 35.0 - 47.0 %    MCV 95 80 - 100 fL    MCH 32.3 27.0 - 34.0 pg    MCHC 34.0 32.0 - 36.0 g/dL    RDW 10.5 (L) 11.0 - 14.5 %    Platelets 209 140 - 440 thou/uL    MPV 7.8 7.0 - 10.0 fL   Vitamin D, Total (25-Hydroxy)   Result Value Ref Range    Vitamin D, Total (25-Hydroxy) 24.7 (L) 30.0 - 80.0 ng/mL   Vitamin B12   Result Value Ref Range    Vitamin B-12 435 213 - 816 pg/mL      ______________________________________________________________________    Assessment:    1. FAVIOLA (iron deficiency anemia)    2. Driving safety issue    3. Gastric carcinoma    4. B12 deficiency    5. Generalized weakness    6. Vitamin D deficiency    7. Memory loss    8. Venous insufficiency       ______________________________________________________________________      PHQ-2 Total Score: 0 (2/22/2018  3:00 PM)  Depression  Follow-up Plan: patient follow-up to return when and if necessary (7/4/2017  9:00 PM)  No Data Recorded  ______________________________________________________________________    Plan:    1. Check blood work today.   2. Patient would like to go off of iron if able.  3. No other changes in medication at this time.    George Park MD  General Internal Medicine  [facility]     Return in about 4 months (around 6/22/2018) for visit and blood work.     Future Appointments  Date Time Provider Department Center   3/7/2018 1:20 PM [facility]   4/4/2018 1:20 PM [facility]   5/2/2018 3:20 PM [facility]

## 2021-06-26 NOTE — PROGRESS NOTES
Progress Notes by George Park MD at 11/12/2018 11:20 AM     Author: George Park MD Service: -- Author Type: Physician    Filed: 12/11/2018  2:39 PM Encounter Date: 11/12/2018 Status: Addendum    : George Park MD (Physician)    Related Notes: Original Note by George Park MD (Physician) filed at 11/15/2018  9:01 AM              [facility]    Comprehensive and complex medical care - Chronic disease management - Shared decision making - Care coordination - Compassionate care    Patient advocacy - Rational deprescribing - Minimally disruptive medicine - Ethical focus - Customized care    ______________________________________________________________________     Date of Service: 11/12/2018  Primary Provider: George Park MD    Patient Care Team:  George Park MD as PCP - General (Internal Medicine)  Inocente Rivera MD as Physician (Ophthalmology)     ______________________________________________________________________     Patient's Pharmacy:    [facility] [id number] [place] [address] [address] AT [facility] [address]  [address]  [facility] [address]  Phone: [phone] Fax: [phone]     Patient's Contacts:  Name Home Phone Work Phone Mobile Phone Relationship Lgl CASH Brown   [phone] Spouse      Patient's Insurance:    Payor: MEDICARE / Plan: MEDICARE A AND B / Product Type: Medicare /     ______________________________________________________________________     Lana Lantigua is 89 y.o. female who comes in today for:     Chief Complaint   Patient presents with   ? Hospital Visit Follow Up     cva, check left elbow       Patient Active Problem List   Diagnosis   ? Gastric carcinoma (H)   ? HTN (hypertension)   ? Osteoporosis   ? B12 deficiency   ? Nonsmoker   ? Venous insufficiency   ? DNR (do not resuscitate)   ? DNI (do not intubate)   ? Facial droop   ? Altered mental status   ?  Ischemic cerebrovascular accident (CVA) (H)     Past Medical History:   Diagnosis Date   ? B12 deficiency    ? DNI (do not intubate) 10/17/2017   ? DNR (do not resuscitate) 10/17/2017   ? Gastric carcinoma (H)  1994   ? Nonsmoker    ? Osteoporosis    ? Venous insufficiency 8/14/2017   ? White coat hypertension       Current Outpatient Medications   Medication Sig   ? aspirin 81 mg chewable tablet Chew 1 tablet (81 mg total) daily.   ? spironolactone (ALDACTONE) 25 MG tablet Take 1 tablet (25 mg total) by mouth daily.     Social History     Socioeconomic History   ? Marital status:      Spouse name: None   ? Number of children: 7   ? Years of education: None   ? Highest education level: None   Social Needs   ? Financial resource strain: None   ? Food insecurity - worry: None   ? Food insecurity - inability: None   ? Transportation needs - medical: None   ? Transportation needs - non-medical: None   Occupational History   ? 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: RETIRED   Tobacco Use   ? Smoking status: Never Smoker   ? Smokeless tobacco: Never Used   Substance and Sexual Activity   ? Alcohol use: No   ? Drug use: No   ? Sexual activity: None   Other Topics Concern   ? None   Social History Narrative    Has resided in [place] in the past.  Generally has lived in [place], [place].  [place] in [place] at this time.        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     Family History   Problem Relation Age of Onset   ? Heart failure Father    ? Lung cancer Sister    ? Hypertension Mother    ? Varicose Veins Mother    ? Cancer Son          choriocarcinoma      Family history is otherwise noncontributory.    ______________________________________________________________________     History of present illness:    Patient comes in today for a hospital follow up visit.    We reviewed her hospital stay and the records from her visit were reviewed today.  I personally reviewed the lab tests,  radiology and medical tests pertinent to that stay.     Patient comes in today with her  and son Jayme.    We reviewed her altered mental status.  She was in the kitchen and slumped onto the counter.  She held onto the counter and her  cannot really remove her from.  She was disoriented and had a left facial droop.  She was seen in the hospital and had a EEG done which was negative.  She also had MRI which showed a right frontal operculum CVA.  Her workup for this was also reviewed.  She had no evidence for A. fib.  She is doing much better at this time.  She has had some memory issues with disorientation prior to this.    We reviewed her high blood pressure.  Her blood pressure is elevated today.  We reviewed different options to manage this.    We reviewed her gastric carcinoma and this is stable at this time.    Reviewed her stroke and her issues with this.    We reviewed her other issues noted in the assessment but not specifically addressed in the HPI above.     Comprehensive review of systems was performed today with no major problems noted except as above.    ______________________________________________________________________     Exam:    Wt Readings from Last 3 Encounters:   12/11/18 (!) 87 lb 9.6 oz (39.7 kg)   11/12/18 (!) 87 lb (39.5 kg)   11/05/18 (!) 86 lb 6.4 oz (39.2 kg)     BP Readings from Last 3 Encounters:   12/11/18 136/50   11/23/18 120/60   11/19/18 136/66      /60 (Patient Site: Right Arm, Patient Position: Sitting, Cuff Size: Child)   Pulse 86   Wt (!) 87 lb (39.5 kg)   SpO2 98%   BMI 15.91 kg/m    The patient is comfortable, no acute distress.  Mood good.  Insight is fair.  No skin lesions or nodules of concern.  Ears clear.  Eyes are nonicteric.  Pupils equal and reactive.  Throat is clear.  Neck is supple without mass, no thyromegaly.  Carotids are clear.  No cervical or epitrochlear adenopathy.  Heart regular rate and rhythm.  Lungs clear to auscultation  bilaterally.  Respiratory effort good.  Abdomen soft and nontender.  No hepatosplenomegaly.  Extremities show no edema.     ______________________________________________________________________    Diagnostics:    Results for orders placed or performed during the hospital encounter of 11/03/18   INR   Result Value Ref Range    INR 1.18 (H) 0.90 - 1.10   APTT(PTT)   Result Value Ref Range    PTT 26 24 - 37 seconds   Basic Metabolic Panel   Result Value Ref Range    Sodium 141 136 - 145 mmol/L    Potassium 3.9 3.5 - 5.0 mmol/L    Chloride 109 (H) 98 - 107 mmol/L    CO2 23 22 - 31 mmol/L    Anion Gap, Calculation 9 5 - 18 mmol/L    Glucose 98 70 - 125 mg/dL    Calcium 8.8 8.5 - 10.5 mg/dL    BUN 14 8 - 28 mg/dL    Creatinine 0.74 0.60 - 1.10 mg/dL    GFR MDRD Af Amer >60 >60 mL/min/1.73m2    GFR MDRD Non Af Amer >60 >60 mL/min/1.73m2   HM2(CBC w/o Differential)   Result Value Ref Range    WBC 6.8 4.0 - 11.0 thou/uL    RBC 3.63 (L) 3.80 - 5.40 mill/uL    Hemoglobin 11.6 (L) 12.0 - 16.0 g/dL    Hematocrit 35.5 35.0 - 47.0 %    MCV 98 80 - 100 fL    MCH 32.0 27.0 - 34.0 pg    MCHC 32.7 32.0 - 36.0 g/dL    RDW 12.4 11.0 - 14.5 %    Platelets 202 140 - 440 thou/uL    MPV 10.9 8.5 - 12.5 fL   Urinalysis-UC if Indicated   Result Value Ref Range    Color, UA Yellow Colorless, Yellow, Straw, Light Yellow    Clarity, UA Clear Clear    Glucose, UA Negative Negative    Bilirubin, UA Negative Negative    Ketones, UA Trace (!) Negative, 60 mg/dL    Specific Gravity, UA >1.050 (H) 1.001 - 1.030    Blood, UA Negative Negative    pH, UA 5.5 4.5 - 8.0    Protein, UA Trace (!) Negative mg/dL    Urobilinogen, UA <2.0 E.U./dL <2.0 E.U./dL, 2.0 E.U./dL    Nitrite, UA Negative Negative    Leukocytes, UA Negative Negative    Bacteria, UA None Seen None Seen hpf    RBC, UA 0-2 None Seen, 0-2 hpf    WBC, UA 0-5 None Seen, 0-5 hpf    Squam Epithel, UA 0-5 None Seen, 0-5 lpf    Mucus, UA Few (!) None Seen lpf   Magnesium   Result Value Ref Range     Magnesium 2.2 1.8 - 2.6 mg/dL   Prolactin   Result Value Ref Range    Prolactin 44.5 (H) 0.0 - 20.0 ng/mL   Vitamin B12   Result Value Ref Range    Vitamin B-12 381 213 - 816 pg/mL   Lipid Profile   Result Value Ref Range    Triglycerides 41 <=149 mg/dL    Cholesterol 167 <=199 mg/dL    LDL Calculated 81 <=129 mg/dL    HDL Cholesterol 78 >=50 mg/dL   Glycosylated Hemoglobin A1C   Result Value Ref Range    Hemoglobin A1c 5.8 4.2 - 6.1 %   Hepatic Profile   Result Value Ref Range    Bilirubin, Total 0.9 0.0 - 1.0 mg/dL    Bilirubin, Direct 0.3 <=0.5 mg/dL    Protein, Total 4.9 (L) 6.0 - 8.0 g/dL    Albumin 3.2 (L) 3.5 - 5.0 g/dL    Alkaline Phosphatase 60 45 - 120 U/L    AST 27 0 - 40 U/L    ALT 20 0 - 45 U/L   Prealbumin   Result Value Ref Range    Prealbumin 11.0 (L) 19.0 - 38.0 mg/dL   HM2(CBC w/o Differential)   Result Value Ref Range    WBC 6.3 4.0 - 11.0 thou/uL    RBC 3.37 (L) 3.80 - 5.40 mill/uL    Hemoglobin 10.7 (L) 12.0 - 16.0 g/dL    Hematocrit 33.1 (L) 35.0 - 47.0 %    MCV 98 80 - 100 fL    MCH 31.8 27.0 - 34.0 pg    MCHC 32.3 32.0 - 36.0 g/dL    RDW 12.5 11.0 - 14.5 %    Platelets 151 140 - 440 thou/uL    MPV 10.1 8.5 - 12.5 fL   Vitamin B12   Result Value Ref Range    Vitamin B-12 353 213 - 816 pg/mL   POCT Glucose   Result Value Ref Range    Glucose,  mg/dL   POCT GFR   Result Value Ref Range    POC GFR AMER AF HE >60  >60 mL/min/1.73m2    POC GFR NON AMER AF >60  >60 mL/min/1.73m2   POCT Glucose   Result Value Ref Range    Glucose,  mg/dL   POCT Glucose   Result Value Ref Range    Glucose, POC 91 mg/dL   POCT Glucose   Result Value Ref Range    Glucose,  mg/dL   POCT Glucose   Result Value Ref Range    Glucose,  mg/dL   POCT Glucose   Result Value Ref Range    Glucose, POC 93 mg/dL   POCT Glucose   Result Value Ref Range    Glucose,  mg/dL   POCT Glucose   Result Value Ref Range    Glucose,  mg/dL   ECG 12 lead nursing unit performed   Result Value Ref  Range    SYSTOLIC BLOOD PRESSURE  mmHg    DIASTOLIC BLOOD PRESSURE  mmHg    VENTRICULAR RATE 76 BPM    ATRIAL RATE 76 BPM    P-R INTERVAL 184 ms    QRS DURATION 90 ms    Q-T INTERVAL 410 ms    QTC CALCULATION (BEZET) 461 ms    P Axis 94 degrees    R AXIS 5 degrees    T AXIS 63 degrees    MUSE DIAGNOSIS       Normal sinus rhythm  Normal ECG  No previous ECGs available  Confirmed by NAYE MENEZES MD LOC:[facility] ([id number]) on 11/4/2018 1:00:46 PM     Echo Complete   Result Value Ref Range    Hieght 62 in    Weight 1,382.4 lbs    /80 mmHg    HR 71 bpm    LV volume diastolic 33 46 - 106 cm3    LV volume systolic 12 14 - 42 cm3    IVSd 0.8 0.6 - 0.9 cm    LVIDd 3.9 3.8 - 5.2 cm    LVIDs 2.4 2.2 - 3.5 cm    LVOT diam 1.9 cm    LVOT mean gradient 1 mmHg    LVOT peak VTI 13.9 cm    LVOT mean abiel 45.6 cm/s    LV PWd 0.8 0.6 - 0.9 cm    MV E' lat abiel 7.31 cm/s    MV E' med abiel 8.48 cm/s    AR decel slope 2,020 mm/s2    AR p 1/2 time 749 ms    AR peak abiel 437 cm/s    AO root 3.2 cm    LA size 2.9 cm    LA/AO root ratio 0.906 no units    MV decel slope 3,530 mm/s2    MV decel time 165 ms    MV P 1/2 time 78 ms    MV peak A abiel 39 cm/s    MV peak E abiel 80.9 cm/s    WV peak abiel 82.8 cm/s    WV peak gradient 3 mmHg    TR peak abiel 205 cm/s    TAPSE 1.4 cm    IVS/PW ratio 1.0     TR peak gradent 16.8 mmHg    LV FS 38.5 28 - 44 %    Echo LVEF calculated 64 55 - 75 %    LV mass 89.7 g    MV area p 1/2 time 2.8 cm2    MV E/A Ratio 2.1     LVOT area 2.83 cm2    LVOT SV 39.4 cm3    MV med E/e' ratio 9.5     MV lat E/e' ratio 11.1     LV CO 2.8 l/min    Height 62.0 in    Weight 86 lbs    MV Avg E/e' Ratio 10.2 cm/s    AR peak gradient 76.4 mmHg   POCT creatinine   Result Value Ref Range    POC Creatinine 0.7 0.6 - 1.1 mg/dL   POCT creatinine   Result Value Ref Range    POC Creatinine 0.7 mg/dL      ______________________________________________________________________    Pertinent radiology for this visit includes the following:    Cta  Head And Neck    Result Date: 11/3/2018  [facility] RADIOLOGY EXAM: CTA HEAD AND NECK LOCATION: [facility] DATE/TIME: 11/3/2018 7:12 PM INDICATION: Tia or cva stroke code COMPARISON: None available at time of interpretation CONTRAST: 98 ML OMNI 350 TECHNIQUE: Head and neck CT angiogram with IV contrast. Noncontrast head CT followed by axial helical CT images of the head and neck vessels obtained during the arterial phase of intravenous contrast administration. Axial 2D reconstructed images and multiplanar 3D MIP reconstructed images of the head and neck vessels were performed by the technologist. Dose reduction techniques were used. FINDINGS: NONCONTRAST HEAD CT: INTRACRANIAL CONTENTS: No intracranial hemorrhage, extraaxial collection, or mass effect.  No CT evidence of acute infarct. Mild diffuse parenchymal volume loss with ex vacuo ventricular dilatation. Low-density area in the right frontal operculum with suggestion of volume loss. VISUALIZED ORBITS/SINUSES/MASTOIDS: No significant orbital abnormality. No significant paranasal sinus mucosal disease. No significant middle ear or mastoid effusion. OSSEOUS STRUCTURES/SOFT TISSUES: No significant abnormality. HEAD CTA: ANTERIOR CIRCULATION: No significant stenosis or occlusion. Standard [place] anatomy. POSTERIOR CIRCULATION: No significant stenosis or occlusion. Balanced vertebral arteries supply a normal basilar artery. ANEURYSM/VASCULAR MALFORMATION: None. DURAL VENOUS SINUSES: Expected enhancement of the major dural venous sinuses. NECK CTA: RIGHT CAROTID: No measurable stenosis in the right ICA based on NASCET criteria. LEFT CAROTID: No measurable stenosis in the left ICA based on NASCET criteria. VERTEBRAL ARTERIES: The left vertebral artery is dominant and the right vertebral artery is diminutive. The caliber of the right vertebral artery is quite diminutive involving the V1 and proximal V2 segment with slight increase in caliber beginning  distally at the C5 level and extending intracranially. AORTIC ARCH: Classic aortic arch anatomy with no significant stenosis at the origin of the great vessels. MISCELLANEOUS: No evidence for dissection or pseudoaneurysm. 4 mm low-density nodule in the right thyroid lobe and subcentimeter partially calcified nodule in the left thyroid lobe. Extensive right apical scarring. Severe loss of disc height at C6-C7.     CONCLUSION: HEAD CT: 1.  No acute intracranial abnormality. 2.  Low-density area involving the right frontal operculum with associated volume loss most consistent with encephalomalacia. HEAD CTA: 1.  No branch vessel occlusion, high-grade stenosis, aneurysm, or high flow vascular malformation involving proximal [place] vessels. NECK CTA: 1.  No significant stenosis of the common or internal carotid arteries based on NASCET criteria. . 2.  Diminutive right vertebral artery. There is slight increase in caliber of the right vertebral artery beginning distally at the C5 level and extending intracranially. The proximal portion including the V1 and proximal V2 segments, appears quite diminutive and slightly irregular. This could relate to either atherosclerotic changes, or possibly to an age indeterminate (but probably old) dissection. No dissection flap is identified. The vertebral artery remains patent.  Noncontrast head CT findings discussed with Dr. Pelayo on 11/3/2018 at 1903, and CTA results discussed with Dr. Pelayo and Dr. Pantoja on 11/3/2018 at 1926 and 1954.     Mr Brain Without Contrast    Result Date: 11/4/2018  [facility] RADIOLOGY EXAM: MR BRAIN WITHOUT CONTRAST LOCATION: [facility] DATE/TIME: 11/04/2018, 10:26 AM INDICATION: Confusion, left facial droop. COMPARISON: 11/03/2018 head and neck CTA. TECHNIQUE: Routine multiplanar multisequence head MRI without intravenous contrast. FINDINGS: INTRACRANIAL CONTENTS: Cortically-based FLAIR hyperintense diffusion restriction involving  the right frontal opercular region, consistent with a small focus of cortically-based acute to very early subacute infarct. There is a small amount of encephalomalacia immediately adjacent to this area. No additional foci of acute infarct elsewhere. No intracranial hemorrhage. As above, there is a small focus of encephalomalacia directed towards the right frontal opercular region. Tiny focus of encephalomalacia in the right parietal lobe. Background mild scattered chronic small vessel ischemic change. Moderate diffuse intraparenchymal volume loss. Ventricular size is in keeping with this volume loss. Major intracranial vascular flow-voids are preserved, with a dominant left vertebral artery. Brainstem and cerebellum are unremarkable. SELLA: No significant abnormality accounting for technique. OSSEOUS STRUCTURES/SOFT TISSUES: No aggressive osseous lesion involving the calvarium, skull base, or visualized upper cervical spine. ORBITS: Prior bilateral cataract surgery. Visualized portions of the orbits are otherwise unremarkable. SINUSES/MASTOIDS: No significant paranasal sinus mucosal disease. No significant middle ear or mastoid effusion.     CONCLUSION: 1.  Small focus of cortically-based acute to early subacute infarct in the right frontal opercular region. 2.  No additional foci of recent infarction. No intracranial hemorrhage. 3.  Small focus of encephalomalacia immediately adjacent to the above-described acute to early subacute cortically-based right frontal opercular infarct, with an additional tiny focus of encephalomalacia in the right parietal lobe. 4.  Moderate diffuse intraparenchymal volume loss with mild scattered chronic small vessel ischemic change.  Findings and impression discussed with Dr. Rachel by phone at 1055 hours on 11/04/2018.      ______________________________________________________________________   ______________________________________________________________________      Assessment:    1. Hospital discharge follow-up    2. Facial droop    3. Altered mental status, unspecified altered mental status type    4. Ischemic cerebrovascular accident (CVA) (H)    5. Gastric carcinoma (H)    6. B12 deficiency    7. Venous insufficiency    8. Essential hypertension       ______________________________________________________________________     PHQ-2 Total Score: 0 (11/12/2018 11:00 AM)    No Data Recorded      Plan:    1. We did decide to add a low dose of spironolactone for her blood pressure with the idea of trying to prevent stroke.  Be careful for hypotension.  The family is in agreement with this.  2. She will follow-up in 1 month as scheduled.  We will check blood pressure at that time.  3. Consider Chem-8 at the next visit or other blood work if needed.  4. She appears to have recovered fully from this.  5. She should follow-up sooner if issues.  6. She had a skin tear over her left elbow which is doing okay at this point.    Medication reconciliation was performed as a part of this visit as well.         George Park MD  General Internal Medicine  [facility]     Personal office fax - [phone]  Voice mail - [phone]  E-mail - [email or web address]    Return in about 1 month (around 12/12/2018).    No future appointments.     ______________________________________________________________________     Relevant ICD-10 codes and order associations:      ICD-10-CM    1. Hospital discharge follow-up Z09    2. Facial droop R29.810    3. Altered mental status, unspecified altered mental status type R41.82    4. Ischemic cerebrovascular accident (CVA) (H) I63.9    5. Gastric carcinoma (H) C16.9    6. B12 deficiency E53.8    7. Venous insufficiency I87.2    8. Essential hypertension I10

## 2021-06-27 NOTE — PROGRESS NOTES
Progress Notes by George Park MD at 7/11/2019 12:50 PM     Author: George Park MD Service: -- Author Type: Physician    Filed: 7/11/2019  1:22 PM Encounter Date: 7/11/2019 Status: Signed    : George Park MD (Physician)              [facility]    Comprehensive and complex medical care - Chronic disease management - Shared decision making - Care coordination - Compassionate care    Patient advocacy - Rational deprescribing - Minimally disruptive medicine - Ethical focus - Customized care    ______________________________________________________________________     Date of Service: 7/11/2019  Primary Provider: George Park MD    Patient Care Team:  George Park MD as PCP - General (Internal Medicine)  Inocente Rivera MD as Physician (Ophthalmology)     ______________________________________________________________________     Patient's Pharmacy:    [facility] [id number] [place] [address] [address] AT [facility] [address]  [address]  [facility] [address]  Phone: [phone] Fax: [phone]     Patient's Contacts:  Name Home Phone Work Phone Mobile Phone Relationship Lgl CASH Brown   [phone] Spouse    EVIN DELGADO [phone] Child      Patient's Insurance:    Payor: MEDICARE / Plan: MEDICARE A AND B / Product Type: Medicare /   ______________________________________________________________________   ______________________________________________________________________     Lana Lantigua is a 89 y.o. female who comes in today for:    Chief Complaint   Patient presents with   ? Follow-up     cellulitis in left leg doing better       Active Problem List:  Problem List as of 7/11/2019 Reviewed: 6/24/2019  4:18 PM by Yoan Luis MD       High    DNI (do not intubate)    DNR (do not resuscitate)    Nonsmoker    Dementia    Gastric carcinoma (H)    Ischemic cerebrovascular  accident (CVA) (H) - 2018       Medium    HTN (hypertension)       Low    B12 deficiency    Osteoporosis    Venous insufficiency           Current Outpatient Medications   Medication Sig   ? aspirin 81 mg chewable tablet Chew 1 tablet (81 mg total) daily.   ? spironolactone (ALDACTONE) 25 MG tablet Take 25 mg by mouth daily.            Social History     Social History Narrative    Has resided in [place] in the past.  Generally has lived in [place], [place].  [place] in [place] at this time.        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     ______________________________________________________________________     History of present illness:    Roomed by: kai hodge    Accompanied by Daughter    Refills needed? No    Do you have any forms that need to be filled out? No        Patient comes in today for follow-up of left lower extremity cellulitis.  She also had cellulitis of this extremity in 2017.  This is her second episode.  She was on clindamycin which was extended for a bit longer.  Her swelling and redness have improved significantly.  Please refer to the note below for pictures.  She has no fevers or chills.  She did not have issues with fevers or chills.    We reviewed her high blood pressure.  Her blood pressure is doing well.  She is taking the spironolactone.  She has had no new strokelike symptoms.    She does have venous insufficiency prominent in the left leg and her ultrasound done at the hospital initially with this second round of cellulitis was normal.  There was no signs of blood clot.    We reviewed her B12 deficiency and she continues on this.  This is due to gastric carcinoma in the past.  She does need renewal of her B12 orders.    Her daughter found some blood on her sheet near her head and wants to make sure that there is no signs of nosebleed or ear bleeding.  She did not see any sores on her body.    We reviewed her other issues noted in the assessment but not  specifically addressed in the HPI above.     On review of systems, the patient denies any chest pain or shortness of breath.    ______________________________________________________________________    Exam:    Wt Readings from Last 3 Encounters:   07/11/19 (!) 92 lb (41.7 kg)   07/02/19 (!) 95 lb (43.1 kg)   06/27/19 (!) 97 lb 11.2 oz (44.3 kg)     BP Readings from Last 3 Encounters:   07/11/19 136/54   07/02/19 132/60   06/27/19 138/68     /54   Pulse 98   Temp 98.7  F (37.1  C) (Oral)   Wt (!) 92 lb (41.7 kg)   SpO2 96%   BMI 16.83 kg/m     The patient is comfortable, no acute distress.  Mood good.  Insight poor.  Eyes are nonicteric.  Neck is supple without mass.  No cervical adenopathy.  No thyromegaly. Heart regular rate and rhythm.  Lungs clear to auscultation bilaterally.  Respiratory effort is good.  Extremities 1+ ankle edema.  Ears and nose show no sign of bleeding.  There is no warmth to the swelling noted below.              ______________________________________________________________________    Diagnostics:    Results for orders placed or performed during the hospital encounter of 06/24/19   Blood culture from PERIPHERAL SITE   Result Value Ref Range    Anaerobic Blood Culture Bottle No Growth No Growth, No organisms seen, bottle returned to instrument, Specimen not received, No Growth at 24 hours, No Growth at 48 hours, No Growth at 72 hours, No Growth at 96 hours, No Growth at 120 hours    Aerobic Blood Culture Bottle No Growth No Growth, No organisms seen, bottle returned to instrument, Specimen not received, No Growth at 24 hours, No Growth at 120 hours, No Growth at 48 hours, No Growth at 72 hours, No Growth at 96 hours   Basic Metabolic Panel   Result Value Ref Range    Sodium 144 136 - 145 mmol/L    Potassium 3.5 3.5 - 5.0 mmol/L    Chloride 109 (H) 98 - 107 mmol/L    CO2 26 22 - 31 mmol/L    Anion Gap, Calculation 9 5 - 18 mmol/L    Glucose 96 70 - 125 mg/dL    Calcium 9.3 8.5 -  10.5 mg/dL    BUN 9 8 - 28 mg/dL    Creatinine 0.76 0.60 - 1.10 mg/dL    GFR MDRD Af Amer >60 >60 mL/min/1.73m2    GFR MDRD Non Af Amer >60 >60 mL/min/1.73m2   HM2 (CBC W/O DIFF)   Result Value Ref Range    WBC 7.4 4.0 - 11.0 thou/uL    RBC 3.50 (L) 3.80 - 5.40 mill/uL    Hemoglobin 10.9 (L) 12.0 - 16.0 g/dL    Hematocrit 34.1 (L) 35.0 - 47.0 %    MCV 97 80 - 100 fL    MCH 31.1 27.0 - 34.0 pg    MCHC 32.0 32.0 - 36.0 g/dL    RDW 12.4 11.0 - 14.5 %    Platelets 274 140 - 440 thou/uL    MPV 9.7 8.5 - 12.5 fL   C-Reactive Protein   Result Value Ref Range    CRP 0.2 0.0 - 0.8 mg/dL     ______________________________________________________________________    Assessment:    1. Cellulitis of left lower extremity    2. Gastric carcinoma (H)    3. Ischemic cerebrovascular accident (CVA) (H) - 2018    4. B12 deficiency    5. Venous insufficiency    6. Essential hypertension      ______________________________________________________________________     PHQ-2 Total Score: 0 (5/28/2019  3:00 PM)    No data recorded  ______________________________________________________________________     BMI Readings from Last 1 Encounters:   07/11/19 16.83 kg/m      ______________________________________________________________________    Plan:    1. Okay to follow off of antibiotics.  2. Swelling will likely improve in the next 2 weeks to 3 weeks.  3. She can be scheduled in a reserved slot with me if she is not improving.  4. Continue spironolactone 25 mg a day.  5. Renewed B12 orders.    George Park MD  General Internal Medicine  [facility]     Return in about 5 months (around 12/11/2019).     Future Appointments   Date Time Provider Department Center   8/1/2019 10:20 AM [facility]   11/29/2019 10:05 AM George Park MD [facility]         ______________________________________________________________________     Relevant ICD-10 codes and order associations:      ICD-10-CM    1.  Cellulitis of left lower extremity L03.116    2. Gastric carcinoma (H) C16.9    3. Ischemic cerebrovascular accident (CVA) (H) - 2018 I63.9    4. B12 deficiency E53.8 cyanocobalamin injection 1,000 mcg   5. Venous insufficiency I87.2    6. Essential hypertension I10

## 2021-06-27 NOTE — PROGRESS NOTES
Progress Notes by George Park MD at 12/11/2018  2:15 PM     Author: George Park MD Service: -- Author Type: Physician    Filed: 12/11/2018  9:47 PM Encounter Date: 12/11/2018 Status: Signed    : George Park MD (Physician)              [facility]    Comprehensive and complex medical care - Chronic disease management - Shared decision making - Care coordination - Compassionate care    Patient advocacy - Rational deprescribing - Minimally disruptive medicine - Ethical focus - Customized care    ______________________________________________________________________     Date of Service: 12/11/2018  Primary Provider: George Park MD    Patient Care Team:  George Park MD as PCP - General (Internal Medicine)  Inocente Rivera MD as Physician (Ophthalmology)     ______________________________________________________________________     Patient's Pharmacy:    [facility] [id number] [place] [address] [address] AT [facility] [address]  [address]  [facility] [address]  Phone: [phone] Fax: [phone]     Patient's Contacts:  Name Home Phone Work Phone Mobile Phone Relationship Lgl CASH Brown   [phone] Spouse      Patient's Insurance:    Payor: MEDICARE / Plan: MEDICARE A AND B / Product Type: Medicare /     ______________________________________________________________________     Lana Lantigua is 89 y.o. female who comes in today for:    Chief Complaint   Patient presents with   ? Follow-up     HTN, STROKE, GASTRIC CARCINOMA   ? SORE     ON LEFT UPPER ARM       Active Problem List:  Problem List as of 12/11/2018 Reviewed: 12/11/2018  9:42 PM by George Park MD       High    DNI (do not intubate)    DNR (do not resuscitate)    Nonsmoker    Dementia    Gastric carcinoma (H)    Ischemic cerebrovascular accident (CVA) (H) - 2018       Medium    HTN (hypertension)       Low    B12 deficiency     Osteoporosis    Venous insufficiency           Current Outpatient Medications   Medication Sig   ? aspirin 81 mg chewable tablet Chew 1 tablet (81 mg total) daily.   ? spironolactone (ALDACTONE) 25 MG tablet Take 1 tablet (25 mg total) by mouth daily.     Social History     Social History Narrative    Has resided in [place] in the past.  Generally has lived in [place], [place].  [place] in [place] at this time.        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     ______________________________________________________________________     History of present illness:    Patient comes in today with her daughter.    We reviewed her high blood pressure.  At the last visit, we put her on a low dose of spironolactone.  The reason for this was due to swelling she has had in the past and possible stroke prevention.  She has had no new strokelike symptoms in the last month.  She has been completed with her physical therapy and occupational therapy through home care.    She has had no new strokelike symptoms and feels well.    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  She is minimally expressive overall but does communicate her feelings but not is exquisitely as she previously had.    Reviewed her gastric carcinoma and will run a follow-up her blood level.    We reviewed her B12 deficiency and she got her B12 shot today.    We reviewed her other issues noted in the assessment but not specifically addressed in the HPI above.     On review of systems, the patient denies any chest pain or shortness of breath.    ______________________________________________________________________    Exam:    Wt Readings from Last 3 Encounters:   12/11/18 (!) 87 lb 9.6 oz (39.7 kg)   11/12/18 (!) 87 lb (39.5 kg)   11/05/18 (!) 86 lb 6.4 oz (39.2 kg)     BP Readings from Last 3 Encounters:   12/11/18 136/50   11/23/18 120/60   11/19/18 136/66     /50   " Pulse (!) 59   Ht 4' 11\" (1.499 m)   Wt (!) 87 lb 9.6 oz (39.7 kg)   SpO2 98%   BMI 17.69 kg/m     The patient is comfortable, no acute distress.  Mood good.  Insight fair to poor.  Eyes are nonicteric.  Neck is supple without mass.  No cervical adenopathy.  No thyromegaly. Heart regular rate and rhythm.  Lungs clear to auscultation bilaterally.  Respiratory effort is good.  Abdomen soft and nontender.  No hepatosplenomegaly.  Extremities no edema.  Her gait is slow.  She has a skin lesion over her left upper arm which is consistent with irritated seborrheic keratosis.    ______________________________________________________________________    Diagnostics:    Results for orders placed or performed in visit on 12/11/18   Basic Metabolic Panel   Result Value Ref Range    Sodium 140 136 - 145 mmol/L    Potassium 4.8 3.5 - 5.0 mmol/L    Chloride 104 98 - 107 mmol/L    CO2 26 22 - 31 mmol/L    Anion Gap, Calculation 10 5 - 18 mmol/L    Glucose 107 70 - 125 mg/dL    Calcium 9.5 8.5 - 10.5 mg/dL    BUN 29 (H) 8 - 28 mg/dL    Creatinine 0.84 0.60 - 1.10 mg/dL    GFR MDRD Af Amer >60 >60 mL/min/1.73m2    GFR MDRD Non Af Amer >60 >60 mL/min/1.73m2   HM2(CBC w/o Differential)   Result Value Ref Range    WBC 9.5 4.0 - 11.0 thou/uL    RBC 3.88 3.80 - 5.40 mill/uL    Hemoglobin 12.8 12.0 - 16.0 g/dL    Hematocrit 37.0 35.0 - 47.0 %    MCV 95 80 - 100 fL    MCH 32.9 27.0 - 34.0 pg    MCHC 34.6 32.0 - 36.0 g/dL    RDW 11.3 11.0 - 14.5 %    Platelets 219 140 - 440 thou/uL    MPV 7.9 7.0 - 10.0 fL     ______________________________________________________________________    Assessment:    1. Essential hypertension    2. Anemia, unspecified type    3. Dementia without behavioral disturbance, unspecified dementia type    4. B12 deficiency    5. Gastric carcinoma (H)      ______________________________________________________________________     PHQ-2 Total Score: 0 (11/12/2018 11:00 AM)    No Data " Recorded  ______________________________________________________________________    Plan:    1. Continue spironolactone.  2. Blood work done today.  3. Follow-up again in 4-6 months.  4. Consider removal of seborrheic keratosis in the future if needed or see dermatology if worsens.  5. Remain on aspirin 81 mg a day.  6. Continue B12 therapy.  7. I do not believe that dementia medications would help this patient.    George Park MD  General Internal Medicine  [facility]     Return in about 5 months (around 5/11/2019) for visit and blood work.     Future Appointments   Date Time Provider Department Center   1/10/2019 10:20 AM [facility]   5/28/2019  3:05 PM George Park MD [facility]         ______________________________________________________________________     Relevant ICD-10 codes and order associations:      ICD-10-CM    1. Essential hypertension I10 Basic Metabolic Panel   2. Anemia, unspecified type D64.9 HM2(CBC w/o Differential)   3. Dementia without behavioral disturbance, unspecified dementia type F03.90    4. B12 deficiency E53.8    5. Gastric carcinoma (H) C16.9

## 2021-06-27 NOTE — PROGRESS NOTES
Progress Notes by George Park MD at 5/28/2019  3:05 PM     Author: George Park MD Service: -- Author Type: Physician    Filed: 5/29/2019  7:59 AM Encounter Date: 5/28/2019 Status: Signed    : George Park MD (Physician)              [facility]    Comprehensive and complex medical care - Chronic disease management - Shared decision making - Care coordination - Compassionate care    Patient advocacy - Rational deprescribing - Minimally disruptive medicine - Ethical focus - Customized care    ______________________________________________________________________     Date of Service: 5/28/2019  Primary Provider: George Park MD    Patient Care Team:  George Park MD as PCP - General (Internal Medicine)  Inocente Rivera MD as Physician (Ophthalmology)     ______________________________________________________________________     Patient's Pharmacy:    [facility] [id number] [place] [address] [address] AT [facility] [address]  [address]  [facility] [address]  Phone: [phone] Fax: [phone]     Patient's Contacts:  Name Home Phone Work Phone Mobile Phone Relationship Lgl CASH Brown   [phone] Spouse    EVIN DELGADO [phone] Child      Patient's Insurance:    Payor: MEDICARE / Plan: MEDICARE A AND B / Product Type: Medicare /   ______________________________________________________________________   ______________________________________________________________________     Lana Lantigua is 89 y.o. female who comes in today for:    Chief Complaint   Patient presents with   ? Follow-up     memory loss, HTN   ? B12 Injection   ? sores     x 1 week on back of left leg and right arm       Active Problem List:  Problem List as of 5/28/2019 Reviewed: 5/28/2019 11:15 PM by George Park MD       High    DNI (do not intubate)    DNR (do not resuscitate)    Nonsmoker    Dementia     Gastric carcinoma (H)    Ischemic cerebrovascular accident (CVA) (H) - 2018       Medium    HTN (hypertension)       Low    B12 deficiency    Osteoporosis    Venous insufficiency           Current Outpatient Medications   Medication Sig   ? aspirin 81 mg chewable tablet Chew 1 tablet (81 mg total) daily.   ? spironolactone (ALDACTONE) 25 MG tablet TAKE 1 TABLET(25 MG) BY MOUTH DAILY     Social History     Social History Narrative    Has resided in [place] in the past.  Generally has lived in [place], [place].  [place] in [place] at this time.        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     ______________________________________________________________________     History of present illness:    Roomed by: kai hodge    Accompanied by Spouse    Refills needed? No    Do you have any forms that need to be filled out? No       Reviewed her hypertension today.  Blood pressure has been in the goal range.  Denies any excessive dizziness from the medication with this.     We reviewed the patient's CVA.  She has no had any new stroke like symptoms.     Reviewed dementia and there hasn't been any issues with excessive agitation.     Reviewed the patient's issues with B12 today as well.     She did sustain a skin tear on the left leg about 1 week ago.  Healing slowly - no drainage. Would like us to look at, skin is fragile.    We reviewed her other issues noted in the assessment but not specifically addressed in the HPI above.     On review of systems, the patient denies any chest pain or shortness of breath.    ______________________________________________________________________    Exam:    Wt Readings from Last 3 Encounters:   05/28/19 (!) 96 lb (43.5 kg)   12/11/18 (!) 87 lb 9.6 oz (39.7 kg)   11/12/18 (!) 87 lb (39.5 kg)     BP Readings from Last 3 Encounters:   05/28/19 134/58   12/11/18 136/50   11/23/18 120/60     /58   Pulse 75   Wt (!) 96 lb (43.5 kg)   SpO2 96%   BMI 19.39 kg/m      The patient is comfortable, no acute distress.  Mood good.  Insight poor.  Eyes are nonicteric.  Neck is supple without mass.  No cervical adenopathy.  No thyromegaly. Heart regular rate and rhythm with occasional PAC.  Lungs clear to auscultation bilaterally.  Respiratory effort is good.  Abdomen soft and nontender.  No hepatosplenomegaly.  Extremities no edema.  Wound on left leg is consistent with a skin tear which is healing without cellulitis.    ______________________________________________________________________    Diagnostics:    Results for orders placed or performed in visit on 05/28/19   Basic Metabolic Panel   Result Value Ref Range    Sodium 146 (H) 136 - 145 mmol/L    Potassium 4.2 3.5 - 5.0 mmol/L    Chloride 110 (H) 98 - 107 mmol/L    CO2 28 22 - 31 mmol/L    Anion Gap, Calculation 8 5 - 18 mmol/L    Glucose 95 70 - 125 mg/dL    Calcium 9.7 8.5 - 10.5 mg/dL    BUN 17 8 - 28 mg/dL    Creatinine 0.82 0.60 - 1.10 mg/dL    GFR MDRD Af Amer >60 >60 mL/min/1.73m2    GFR MDRD Non Af Amer >60 >60 mL/min/1.73m2   HM2(CBC w/o Differential)   Result Value Ref Range    WBC 6.1 4.0 - 11.0 thou/uL    RBC 3.77 (L) 3.80 - 5.40 mill/uL    Hemoglobin 12.0 12.0 - 16.0 g/dL    Hematocrit 35.9 35.0 - 47.0 %    MCV 95 80 - 100 fL    MCH 31.8 27.0 - 34.0 pg    MCHC 33.4 32.0 - 36.0 g/dL    RDW 10.7 (L) 11.0 - 14.5 %    Platelets 239 140 - 440 thou/uL    MPV 8.6 7.0 - 10.0 fL     ______________________________________________________________________    Assessment:    1. Ischemic cerebrovascular accident (CVA) (H) - 2018    2. Gastric carcinoma (H)    3. Essential hypertension    4. Venous insufficiency    5. B12 deficiency    6. Dementia without behavioral disturbance, unspecified dementia type    7. Open wound of knee, leg, and ankle, left, initial encounter      ______________________________________________________________________     PHQ-2 Total Score: 0 (5/28/2019  3:00 PM)    No data  recorded  ______________________________________________________________________    Plan:    1. Check blood work today.  See relevant orders and diagnosis associations at the bottom of this note.   2. Continue current medications.  3. Follow up as noted.  4. Bacitracin to the leg wound and follow up as needed.    George Park MD  General Internal Medicine  [facility]     Return in about 6 months (around 11/28/2019) for visit and blood work.     Future Appointments   Date Time Provider Department Center   6/27/2019 10:20 AM [facility]   11/29/2019 10:05 AM George Park MD [facility]         ______________________________________________________________________     Relevant ICD-10 codes and order associations:      ICD-10-CM    1. Ischemic cerebrovascular accident (CVA) (H) - 2018 I63.9    2. Gastric carcinoma (H) C16.9 HM2(CBC w/o Differential)   3. Essential hypertension I10 Basic Metabolic Panel   4. Venous insufficiency I87.2    5. B12 deficiency E53.8    6. Dementia without behavioral disturbance, unspecified dementia type F03.90    7. Open wound of knee, leg, and ankle, left, initial encounter S81.002A     S81.802A     S91.002A

## 2021-06-28 NOTE — PROGRESS NOTES
Progress Notes by George Park MD at 3/16/2020  2:30 PM     Author: George Park MD Service: -- Author Type: Physician    Filed: 3/23/2020 10:01 PM Encounter Date: 3/16/2020 Status: Signed    : George Park MD (Physician)              [facility]    Comprehensive and complex medical care - Chronic disease management - Shared decision making - Care coordination - Compassionate care    Patient advocacy - Rational deprescribing - Minimally disruptive medicine - Ethical focus - Customized care          Date of Service: 3/16/2020  Primary Provider: George Park MD    Patient Care Team:  George Park MD as PCP - General (Internal Medicine)  Inocente Rivera MD as Physician (Ophthalmology)  George Park MD as Assigned PCP     ______________________________________________________________________     Patient's Pharmacy:      [id number] [place] [address] AT [facility] [address]  [address]  [facility] [address]  Phone: [phone] Fax: [phone]     Patient's Contacts:  Name Home Phone Work Phone Mobile Phone Relationship Lgl Grd   CASH LANTIGUA   [phone] Spouse No   ALISTAIR LANTIGUA   [phone] Child No       Patient's Insurance:    Payor: MEDICARE / Plan: MEDICARE A AND B / Product Type: Medicare /           Lana Lantigua is a 89 y.o. female who comes in today for:    Chief Complaint   Patient presents with   ? Medication Management   ? Urinary Frequency   ? Follow-up     AFIB, HEART FAILURE, DVT       Active Problem List:  Problem List as of 3/16/2020 Reviewed: 2/17/2020  2:44 PM by George Park MD       High    DNI (do not intubate)    DNR (do not resuscitate)    Nonsmoker    Chronic atrial fibrillation    Dementia (H)    Gastric carcinoma (H)    Ischemic cerebrovascular accident (CVA) (H) - 2018, 2019       Medium    Acute deep vein thrombosis (DVT) of left femoral vein  (H)    HTN (hypertension)       Low    B12 deficiency    Osteoporosis    Venous insufficiency       Unprioritized    Acute on chronic diastolic (congestive) heart failure (H)    Bleeding diathesis (H) - due to Warfarin    Chronic systolic congestive heart failure (H)    Oropharyngeal dysphagia    Physician orders for life-sustaining treatment (POLST) form indicates patient wish for do-not-resuscitate status           Current Outpatient Medications   Medication Sig   ? acetaminophen (TYLENOL) 500 MG tablet Take 1 tablet (500 mg total) by mouth every 6 (six) hours as needed for pain or fever.   ? cyanocobalamin 1000 MCG tablet Take 1 tablet (1,000 mcg total) by mouth daily.   ? melatonin 3 mg Tab tablet Take 3 mg by mouth at bedtime.   ? metoprolol tartrate (LOPRESSOR) 50 MG tablet Take 50 mg by mouth 2 (two) times a day.   ? triamcinolone (KENALOG) 0.1 % cream Apply topically 2 (two) times a day. (Patient taking differently: Apply topically 2 (two) times a day. Apply to left lower extremity for dermatitis.)   ? warfarin ANTICOAGULANT (COUMADIN/JANTOVEN) 1 MG tablet Take 0.5 tablets (0.5 mg total) by mouth daily. Adjust dose based on INR results.   ? warfarin ANTICOAGULANT (COUMADIN/JANTOVEN) 2 MG tablet TAKE 1 TABLET(2 MG) BY MOUTH DAILY   ? atorvastatin (LIPITOR) 40 MG tablet Take 1 tablet (40 mg total) by mouth daily.   ? ferrous sulfate 325 (65 FE) MG tablet Take 1 tablet (325 mg total) by mouth 2 (two) times a week.   ? spironolactone (ALDACTONE) 25 MG tablet Take 1 tablet (25 mg total) by mouth daily.     Social History     Social History Narrative    Has resided in [place] in the past.  Generally has lived in [place], [place].  [place] in [place] at this time.        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        1/24/20: Lana resides at [facility] in [place].       Subjective:     Roomed by: SHRUTHI ZARATE    Accompanied by Daughter    Refills needed? No    Do you  have any forms that need to be filled out? No       Accompanied by  at today's visit also.    We reviewed her history of stroke.  She is good some days but other days not so much.  She has been waxing and waning in her overall recovery.  They feel like she is doing well overall.    She has lost weight and we reviewed this with him today.  Weight curve is as noted she is 90 pounds from the last month.  She is eating a dysphagia diet but apparently is going okay for her.    We reviewed her urinary incontinence.  She is using adult diapers for this and sometimes soaking through it.  They would like to reduce the diuretic if possible for this.  She has had some signs of heart failure in the past with pleural effusions but not too severe.    We reviewed her insomnia and it has not been helped in the past.  They are using the melatonin at this time.    Reviewed her atrial fibrillation and she remains on warfarin at this time.    We reviewed her B12 deficiency and will be following up on this again today.    Reviewed her venous insufficiency and there is been no further signs of ulceration on her legs at this point.  There is been no signs of cellulitis.    We reviewed her other issues noted in the assessment but not specifically addressed in the HPI above.     Past medical, family and social history reviewed today.     Comprehensive review of systems was performed today with no major problems noted except as above.     Objective:     Wt Readings from Last 3 Encounters:   03/16/20 (!) 82 lb 9.6 oz (37.5 kg)   02/14/20 (!) 90 lb 8 oz (41.1 kg)   01/28/20 (!) 97 lb 11.2 oz (44.3 kg)       BP Readings from Last 3 Encounters:   03/16/20 128/64   02/14/20 134/82   01/30/20 106/68       /64   Pulse 89   Wt (!) 82 lb 9.6 oz (37.5 kg)   SpO2 95%   BMI 14.63 kg/m     The patient is comfortable, no acute distress.  Mood good.  Insight fair to poor.  Eyes are nonicteric.  Neck is supple without mass.  No cervical  adenopathy.  No thyromegaly. Heart irregular rate and rhythm.  Lungs clear to auscultation bilaterally.  Respiratory effort is good.  Abdomen soft and nontender.  No hepatosplenomegaly.  Extremities 1-2+ pitting edema.  Gait is slow.    Diagnostics:     Results for orders placed or performed in visit on 03/16/20   Comprehensive Metabolic Panel   Result Value Ref Range    Sodium 147 (H) 136 - 145 mmol/L    Potassium 3.3 (L) 3.5 - 5.0 mmol/L    Chloride 107 98 - 107 mmol/L    CO2 25 22 - 31 mmol/L    Anion Gap, Calculation 15 5 - 18 mmol/L    Glucose 127 (H) 70 - 125 mg/dL    BUN 29 (H) 8 - 28 mg/dL    Creatinine 0.90 0.60 - 1.10 mg/dL    GFR MDRD Af Amer >60 >60 mL/min/1.73m2    GFR MDRD Non Af Amer 58 (L) >60 mL/min/1.73m2    Bilirubin, Total 0.7 0.0 - 1.0 mg/dL    Calcium 8.8 8.5 - 10.5 mg/dL    Protein, Total 6.9 6.0 - 8.0 g/dL    Albumin 3.5 3.5 - 5.0 g/dL    Alkaline Phosphatase 213 (H) 45 - 120 U/L    AST 49 (H) 0 - 40 U/L    ALT 55 (H) 0 - 45 U/L   Vitamin B12   Result Value Ref Range    Vitamin B-12 1,405 (H) 213 - 816 pg/mL   HM2(CBC w/o Differential)   Result Value Ref Range    WBC 10.2 4.0 - 11.0 thou/uL    RBC 4.59 3.80 - 5.40 mill/uL    Hemoglobin 10.9 (L) 12.0 - 16.0 g/dL    Hematocrit 35.6 35.0 - 47.0 %    MCV 77 (L) 80 - 100 fL    MCH 23.7 (L) 27.0 - 34.0 pg    MCHC 30.6 (L) 32.0 - 36.0 g/dL    RDW 15.2 (H) 11.0 - 14.5 %    Platelets 265 140 - 440 thou/uL    MPV 8.2 7.0 - 10.0 fL   CK Total   Result Value Ref Range    CK, Total 54 30 - 190 U/L   Lipid Cascade RANDOM   Result Value Ref Range    Cholesterol 153 <=199 mg/dL    Triglycerides 61 <=149 mg/dL    HDL Cholesterol 64 >=50 mg/dL    LDL Calculated 77 <=129 mg/dL    Patient Fasting > 8hrs? Unknown    GGT (Gamma GT)   Result Value Ref Range    GGT (Gamma GT) 136 (H) 0 - 50 U/L   Iron and Transferrin Iron Binding Capacity   Result Value Ref Range    Iron 31 (L) 42 - 175 ug/dL    Transferrin 331 212 - 360 mg/dL    Transferrin Saturation, Calculated 8  (L) 20 - 50 %    Transferrin IBC, Calculated 413 313 - 563 ug/dL   Ferritin   Result Value Ref Range    Ferritin 30 10 - 130 ng/mL       Quality review:     PHQ-2 Total Score: 0 (11/29/2019 10:00 AM)    No data recorded  ______________________________________________________________________     BMI Readings from Last 1 Encounters:   03/16/20 14.63 kg/m        Assessment and Plan:     1. Ischemic cerebrovascular accident (CVA) (H) - 2018, 2019  Blood work done as noted below.  She is at risk for recurrence of illness or hospitalization.    - Comprehensive Metabolic Panel  - HM2(CBC w/o Differential)  - CK Total  - Lipid Cascade RANDOM    2. Vitamin B12 deficiency (non anemic)  B12 level is doing well on oral supplementation.  No reason for B12 shots.    - Vitamin B12    3. Microcytic anemia  Might need to consider iron supplement if they are willing.    - Iron and Transferrin Iron Binding Capacity  - Ferritin    4. Elevated alkaline phosphatase level  I suspect that she has biliary sludging related to sleeplessness as the cause of this.  Continue to reassess.    - GGT (Gamma GT)    5. Weight loss  Stop furosemide.  Cannot rule out more serious disease.    6. Abnormal results of liver function studies    7. Chronic atrial fibrillation  Continue anticoagulation at this time.    8. Gastric carcinoma (H)  This is remote and there is been no overwhelming signs of recurrence.    9. Dementia without behavioral disturbance, unspecified dementia type (H)  Stable.    10. Essential hypertension  Continue current medications.    11. B12 deficiency    12. Venous insufficiency  Stop the furosemide and follow.  Continue spironolactone.    Stop the trazodone for sleep as it has not have been helpful.          George Park MD  General Internal Medicine  [facility]     Personal office fax - [phone]   Voice mail - [phone]  E-mail - [email or web address]     Return in about 2 months (around  5/16/2020) for visit and blood work.     Future Appointments   Date Time Provider Department Center   5/29/2020  2:15 PM George Park MD [facility]         HCC issues resolved at this visit.

## 2021-06-28 NOTE — PROGRESS NOTES
Progress Notes by George Park MD at 2/14/2020 11:45 AM     Author: George Park MD Service: -- Author Type: Physician    Filed: 2/17/2020  2:53 PM Encounter Date: 2/14/2020 Status: Signed    : George Park MD (Physician)              [facility] - [facility]    Comprehensive and complex medical care - Chronic disease management - Shared decision making - Care coordination - Compassionate care    Patient advocacy - Rational deprescribing - Minimally disruptive medicine - Ethical focus - Customized care          Date of Service: 2/14/2020  Primary Provider: George Park MD    Patient Care Team:  George Park MD as PCP - General (Internal Medicine)  Inocente Rivera MD as Physician (Ophthalmology)  Goerge Park MD as Assigned PCP     ______________________________________________________________________     Patient's Pharmacy:    [facility][id number] [place] [address] AT [facility] [address]  [address]  [facility] [address]  Phone: [phone] Fax: [phone]    [address]  [address] [place] [id number]  Phone: [phone] Fax: [phone]     Patient's Contacts:  Name Home Phone Work Phone Mobile Phone Relationship Lgl Grd   CASH LANTIGUA   [phone] Spouse No   ALISTAIR LANTIGUA   [phone] Child No     Patient's Insurance:    Payor: MEDICARE / Plan: MEDICARE A AND B / Product Type: Medicare /          Lana Lantigua is 89 y.o. female who comes in today for:     Chief Complaint   Patient presents with   ? TCU follow     look at legs, does still need sleeping aid   ? Appointment Follow-up Question     b12 injections   ? Medication Questions     is extra stregth ok to take, cream on bottom,        Patient Active Problem List   Diagnosis   ? Gastric carcinoma (H)   ? HTN (hypertension)   ? Osteoporosis   ? B12  deficiency   ? Nonsmoker   ? Venous insufficiency   ? DNR (do not resuscitate)   ? DNI (do not intubate)   ? Ischemic cerebrovascular accident (CVA) (H) - 2018, 2019   ? Dementia (H)   ? Acute deep vein thrombosis (DVT) of left femoral vein (H)   ? Chronic systolic congestive heart failure (H)   ? Acute on chronic diastolic (congestive) heart failure (H)   ? Oropharyngeal dysphagia   ? Chronic atrial fibrillation   ? Bleeding diathesis (H) - due to Warfarin     Past Medical History:   Diagnosis Date   ? Acute deep vein thrombosis (DVT) of left femoral vein (H) 1/6/2020   ? B12 deficiency    ? Dementia (H) 12/11/2018   ? DNI (do not intubate) 10/17/2017   ? DNR (do not resuscitate) 10/17/2017   ? Gastric carcinoma (H)  1994   ? Ischemic cerebrovascular accident (CVA) (H) - 2018, 2019 11/4/2018    Right frontal operculum cerebrovascular accident (stroke) in 2018.  Left sided in 2019.  Last MRI scan shows:  EXAM: MR BRAIN COW WO CONTRAST LOCATION: [facility] DATE/TIME: 12/23/2019 5:37 PM   INDICATION: Stroke, follow-up. Follow up TPA/thrombectomy. COMPARISON: Head CT 12/22/2019, head and neck CT angiogram 12/22/2019, brain MRI 11/03/2018. TECHNIQUE:  HEAD MRI: Routine multiplanar    ? Nonsmoker    ? Osteoporosis    ? Venous insufficiency 8/14/2017   ? White coat hypertension       Current Outpatient Medications   Medication Sig   ? acetaminophen (TYLENOL) 500 MG tablet Take 1 tablet (500 mg total) by mouth every 6 (six) hours as needed for pain or fever.   ? aspirin 81 mg chewable tablet Chew 1 tablet (81 mg total) daily.   ? atorvastatin (LIPITOR) 40 MG tablet Take 1 tablet (40 mg total) by mouth at bedtime.   ? furosemide (LASIX) 20 MG tablet Take 1 tablet (20 mg total) by mouth daily.   ? melatonin 3 mg Tab tablet Take 3 mg by mouth at bedtime.   ? metoprolol tartrate (LOPRESSOR) 50 MG tablet Take 50 mg by mouth 2 (two) times a day.   ? traZODone (DESYREL) 50 MG tablet Take 1-2 tablets ( mg total)  by mouth at bedtime. For difficulty sleeping.   May be as needed.   ? triamcinolone (KENALOG) 0.1 % cream Apply topically 2 (two) times a day. (Patient taking differently: Apply topically 2 (two) times a day. Apply to left lower extremity for dermatitis.)   ? warfarin ANTICOAGULANT (COUMADIN/JANTOVEN) 1 MG tablet Take 0.5 tablets (0.5 mg total) by mouth daily. Adjust dose based on INR results.   ? cyanocobalamin 1000 MCG tablet Take 1 tablet (1,000 mcg total) by mouth daily.     No Known Allergies  Social History     Occupational History   ? 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: RETIRED   Tobacco Use   ? Smoking status: Never Smoker   ? Smokeless tobacco: Never Used   Substance and Sexual Activity   ? Alcohol use: No   ? Drug use: No   ? Sexual activity: Not on file     Social History     Social History Narrative    Has resided in [place] in the past.  Generally has lived in [place], [place].  [place] in [place] at this time.        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        1/24/20: Lana resides at [facility] in [place].      Family History   Problem Relation Age of Onset   ? Heart failure Father    ? Lung cancer Sister    ? Hypertension Mother    ? Varicose Veins Mother    ? Cancer Son          choriocarcinoma      Family history is otherwise noncontributory.      Subjective:     Patient comes in today for a hospital follow up visit.    We reviewed her hospital stay and the records from her visit were reviewed today.  I personally reviewed the lab tests, radiology and medical tests pertinent to that stay.    Patient comes in today with her .    She has been in and out of the hospital for a number of issues.  She did have a stroke.  This is her second stroke in 2 years.  It was on the other side of the brain.    She was found to be in atrial fibrillation and started on warfarin.  She is not having any bleeding issue at this time related to  this.    We reviewed her heart failure as well.  She was on in heart failure when she was in the hospital as well.  Her breathing at this time is good.    She also had issues with a DVT with one hospitalization and her medications were changed at that point.    She is doing better from a rehab standpoint.  This was reviewed with her.    Reviewed her B12 deficiency.  We decided to try switching her to oral B12 at this point.  It something we can follow.    Her dementia is essentially stable at this time.    We reviewed her blood pressure today as well.  We reviewed her other issues noted in the assessment but not specifically addressed in the HPI above.     Comprehensive review of systems performed today with no major problems noted except as above.     Objective:     Wt Readings from Last 3 Encounters:   02/14/20 (!) 90 lb 8 oz (41.1 kg)   01/28/20 (!) 97 lb 11.2 oz (44.3 kg)   01/06/20 106 lb 14.4 oz (48.5 kg)     BP Readings from Last 3 Encounters:   02/14/20 134/82   01/30/20 106/68   01/29/20 118/81      /82   Pulse 76   Wt (!) 90 lb 8 oz (41.1 kg)   SpO2 99%   BMI 16.03 kg/m     The patient is comfortable, no acute distress.  Mood good.  Insight is poor.  No skin lesions or nodules of concern.  There is large bruising over the right leg in particular but also in the left leg.  Ears clear.  Eyes are nonicteric.  Pupils equal and reactive.  Throat is clear.  Neck is supple without mass, no thyromegaly.  Carotids are clear.  No cervical or epitrochlear adenopathy.  Heart irregular rate and rhythm.  Lungs clear to auscultation bilaterally.  Respiratory effort good.  Abdomen soft and nontender.  No hepatosplenomegaly.  Extremities show no edema.     Diagnostics:     Results for orders placed or performed in visit on 02/13/20   INR   Result Value Ref Range    INR 2.60 (!) 0.9 - 1.1     Lab Results   Component Value Date    HGBA1C 6.4 (H) 12/23/2019      Results for orders placed or performed in visit on  01/31/20   Basic Metabolic Panel   Result Value Ref Range    Sodium 141 136 - 145 mmol/L    Potassium 3.6 3.5 - 5.0 mmol/L    Chloride 103 98 - 107 mmol/L    CO2 30 22 - 31 mmol/L    Anion Gap, Calculation 8 5 - 18 mmol/L    Glucose 77 70 - 125 mg/dL    Calcium 8.1 (L) 8.5 - 10.5 mg/dL    BUN 18 8 - 28 mg/dL    Creatinine 0.66 0.60 - 1.10 mg/dL    GFR MDRD Af Amer >60 >60 mL/min/1.73m2    GFR MDRD Non Af Amer >60 >60 mL/min/1.73m2     ______________________________________________________________________    Pertinent radiology for this visit includes the following:    CTA Chest PE Run  Narrative: EXAM: CTA CHEST PE RUN  LOCATION: [facility]  DATE/TIME: 1/25/2020 12:36 AM    INDICATION: Pleural effusions, hypoxia, dysphagia due to recent cva.  Elevated lactic and wbc suggests empyema  COMPARISON: None.  TECHNIQUE: CT angiogram chest during arterial phase injection IV contrast. 2D and 3D MIP reconstructions were performed by the CT technologist. Dose reduction techniques were used.   CONTRAST: Iohexol (Omni) 100 mL    FINDINGS:  ANGIOGRAM CHEST: Pulmonary arteries are normal caliber and negative for pulmonary emboli. Thoracic aorta is negative for dissection. No CT evidence of right heart strain.    LUNGS AND PLEURA: Large bilateral pleural effusions with atelectasis involving most of both lower lobes with less extensive atelectasis in the upper lobes. Interstitial prominence both upper lobes compatible with edema.    MEDIASTINUM/AXILLAE: No enlarged mediastinal or hilar adenopathy. Coronary artery calcifications.    UPPER ABDOMEN: Reflux of contrast material into the IVC and hepatic veins. Atherosclerotic disease abdominal aorta.    MUSCULOSKELETAL: Normal.  Impression: 1.  No evidence for pulmonary emboli.  2.  Large bilateral pleural effusions with atelectasis involving most of both lower lobes.  3.  Reflux of contrast material into the IVC and hepatic veins suggesting heart  failure.      ______________________________________________________________________      Results for orders placed during the hospital encounter of 12/22/19   Echo Complete [ECH10] 12/23/2019    Narrative   Normal left ventricular size.The estimated left ventricular ejection   fraction is 50%. This represents a mildly decreased ejection fraction.   Unable to assess left ventricular diastolic function given significant   mitral annular calcification.    The following segments are hypokinetic: basal anteroseptal and mid   anteroseptal. All other segments are normal.    Normal right ventricular systolic function. The right ventricle is   mildly dilated.    Right Atrium: Cavity is severely dilated.    Mild aortic stenosis. Moderate aortic regurgitation.    Mild to moderate mitral regurgitation.    Moderate tricuspid valve regurgitation. Mild pulmonary hypertension   present. The estimated systolic pulmonary artery pressure is 41 mmhg.    Estimated central venous pressure equal to 15 mmHg.    When compared to the previous study dated 11/5/2018, there are changes   noted.          No results found for this or any previous visit.        Assessment:     1. Primary insomnia    2. Ischemic cerebrovascular accident (CVA) (H) - 2018, 2019    3. Acute deep vein thrombosis (DVT) of left femoral vein (H)    4. Aphasia    5. Chronic atrial fibrillation    6. Dementia without behavioral disturbance, unspecified dementia type (H)    7. Gastric carcinoma (H)    8. Essential hypertension    9. Oropharyngeal dysphagia    10. Right hemiplegia (H)    11. Bleeding diathesis (H)    12. Acute on chronic diastolic (congestive) heart failure (H)      ______________________________________________________________________     PHQ-2 Total Score: 0 (11/29/2019 10:00 AM)    No data recorded  ______________________________________________________________________     BMI Readings from Last 1 Encounters:   02/14/20 16.03 kg/m        Plan:      1. Continue to monitor.  2. Follow INR.  3. Follow weight.  4. Try oral B12.  5. Increase trazodone given increased insomnia.  6. Continue warfarin at this point.  Having some bruising.  7. Extensive blood work next visit.      Post Discharge Medication Reconciliation Status: discharge medications reconciled, continue medications without change         George Park MD  General Internal Medicine  [facility]    Personal office fax - [phone]   Voice mail - [phone]  E-mail - [email or web address]      Return in about 4 weeks (around 3/13/2020) for visit and blood work.     Future Appointments   Date Time Provider Department Center   3/16/2020 11:45 AM George Park MD [facility]   5/29/2020  2:15 PM George Park MD [facility]         ______________________________________________________________________     Relevant ICD-10 codes and order associations:      ICD-10-CM    1. Primary insomnia F51.01 traZODone (DESYREL) 50 MG tablet     cyanocobalamin 1000 MCG tablet   2. Ischemic cerebrovascular accident (CVA) (H) - 2018, 2019 I63.9    3. Acute deep vein thrombosis (DVT) of left femoral vein (H) I82.412    4. Aphasia R47.01    5. Chronic atrial fibrillation I48.20    6. Dementia without behavioral disturbance, unspecified dementia type (H) F03.90    7. Gastric carcinoma (H) C16.9    8. Essential hypertension I10    9. Oropharyngeal dysphagia R13.12    10. Right hemiplegia (H) G81.91    11. Bleeding diathesis (H) D69.9    12. Acute on chronic diastolic (congestive) heart failure (H) I50.33

## 2021-06-28 NOTE — PROGRESS NOTES
Progress Notes by George Park MD at 10/4/2019 11:20 AM     Author: George Park MD Service: -- Author Type: Physician    Filed: 10/4/2019  7:39 PM Encounter Date: 10/4/2019 Status: Signed    : George Park MD (Physician)             [facility]    Comprehensive and complex medical care - Chronic disease management - Shared decision making - Care coordination - Compassionate care    Patient advocacy - Rational deprescribing - Minimally disruptive medicine - Ethical focus - Customized care    ______________________________________________________________________     Date of Service: 10/4/2019  Primary Provider: George Park MD    Patient Care Team:  George Park MD as PCP - General (Internal Medicine)  Inocente Rivera MD as Physician (Ophthalmology)  George Park MD as Assigned PCP     ______________________________________________________________________     Patient's Pharmacy:    [id number] [address] AT [facility] [address]  [address]  [facility] [address]  Phone: [phone] Fax: [phone]     Patient's Contacts:  Name Home Phone Work Phone Mobile Phone Relationship Lgl CASH Brown   [phone] Spouse    EVIN DELGADO [phone] Child      Patient's Insurance:    Payor: MEDICARE / Plan: MEDICARE A AND B / Product Type: Medicare /   ______________________________________________________________________   ______________________________________________________________________     Lana Lantigua is a 89 y.o. female who comes in today for:    Chief Complaint   Patient presents with   ? Leg Swelling     left left cellulitis   ? Immunizations     Flu shot       Active Problem List:  Problem List as of 10/4/2019 Reviewed: 10/4/2019  7:33 PM by George Park MD High    DNI (do not intubate)    DNR (do not resuscitate)    Nonsmoker     Dementia (H)    Gastric carcinoma (H)    Ischemic cerebrovascular accident (CVA) (H) - 2018       Medium    HTN (hypertension)       Low    B12 deficiency    Osteoporosis    Venous insufficiency           Current Outpatient Medications   Medication Sig   ? aspirin 81 mg chewable tablet Chew 1 tablet (81 mg total) daily.   ? spironolactone (ALDACTONE) 25 MG tablet Take 25 mg by mouth daily.          ? cephalexin (KEFLEX) 500 MG capsule Take 1 capsule (500 mg total) by mouth 3 (three) times a day for 7 days.   ? triamcinolone (KENALOG) 0.1 % cream Apply topically 2 (two) times a day.     Social History     Patient does not qualify to have social determinant information on file (likely too young).   Social History Narrative    Has resided in [place] in the past.  Generally has lived in [place], [place].  [place] in [place] at this time.        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     ______________________________________________________________________     History of present illness:    Roomed by: [facility]    Refills needed? No    Do you have any forms that need to be filled out? No       Accompanied by  at today's visit.       First in today for redness in the lower extremity left for about 3-4 days.  May be slightly better.  No fever or chills.  No pain associated with this.  Has had recurrent cellulitis in this area and this has been related to sores initially.  Nothing seems to make it worse or better.    Did have more swelling in this leg as well.      Has had a rash associated with the left leg preceding this.  Some itching with this.  Does scratch and not putting cream on it.    Reviewed her hypertension today.  Blood pressure has been in the goal range.  Denies any excessive dizziness from the medication with this.     We reviewed her other issues noted in the assessment but not specifically addressed in the HPI above.     On review of systems, the patient denies any chest pain or  "shortness of breath.    ______________________________________________________________________    Exam:    Wt Readings from Last 3 Encounters:   10/04/19 (!) 95 lb 9.6 oz (43.4 kg)   07/11/19 (!) 92 lb (41.7 kg)   07/02/19 (!) 95 lb (43.1 kg)     BP Readings from Last 3 Encounters:   10/04/19 120/70   07/11/19 136/54   07/02/19 132/60     /70 (Patient Site: Right Arm, Patient Position: Sitting, Cuff Size: Adult Regular)   Pulse 80   Temp 97.5  F (36.4  C) (Oral)   Ht 5' 2\" (1.575 m)   Wt (!) 95 lb 9.6 oz (43.4 kg)   SpO2 98%   BMI 17.49 kg/m     The patient is comfortable, no acute distress.  Mood good.  Insight poor.  Eyes are nonicteric.  Neck is supple without mass.  No cervical adenopathy.  No thyromegaly. Heart regular rate and rhythm.  Lungs clear to auscultation bilaterally.  Respiratory effort is good. Left lower extremity shows 1-2+ edema with stasis dermatitis.  There is some mild cellulitis associated with with this and warmth.  Gait is okay.    ______________________________________________________________________    Diagnostics:    Results for orders placed or performed during the hospital encounter of 06/24/19   Blood culture from PERIPHERAL SITE   Result Value Ref Range    Anaerobic Blood Culture Bottle No Growth No Growth, No organisms seen, bottle returned to instrument, Specimen not received, No Growth at 24 hours, No Growth at 48 hours, No Growth at 72 hours, No Growth at 96 hours, No Growth at 120 hours    Aerobic Blood Culture Bottle No Growth No Growth, No organisms seen, bottle returned to instrument, Specimen not received, No Growth at 24 hours, No Growth at 120 hours, No Growth at 48 hours, No Growth at 72 hours, No Growth at 96 hours   Basic Metabolic Panel   Result Value Ref Range    Sodium 144 136 - 145 mmol/L    Potassium 3.5 3.5 - 5.0 mmol/L    Chloride 109 (H) 98 - 107 mmol/L    CO2 26 22 - 31 mmol/L    Anion Gap, Calculation 9 5 - 18 mmol/L    Glucose 96 70 - 125 mg/dL    " Calcium 9.3 8.5 - 10.5 mg/dL    BUN 9 8 - 28 mg/dL    Creatinine 0.76 0.60 - 1.10 mg/dL    GFR MDRD Af Amer >60 >60 mL/min/1.73m2    GFR MDRD Non Af Amer >60 >60 mL/min/1.73m2   HM2 (CBC W/O DIFF)   Result Value Ref Range    WBC 7.4 4.0 - 11.0 thou/uL    RBC 3.50 (L) 3.80 - 5.40 mill/uL    Hemoglobin 10.9 (L) 12.0 - 16.0 g/dL    Hematocrit 34.1 (L) 35.0 - 47.0 %    MCV 97 80 - 100 fL    MCH 31.1 27.0 - 34.0 pg    MCHC 32.0 32.0 - 36.0 g/dL    RDW 12.4 11.0 - 14.5 %    Platelets 274 140 - 440 thou/uL    MPV 9.7 8.5 - 12.5 fL   C-Reactive Protein   Result Value Ref Range    CRP 0.2 0.0 - 0.8 mg/dL     ______________________________________________________________________    Assessment:    1. Cellulitis of left leg    2. Venous stasis dermatitis of left lower extremity    3. Flu vaccine need    4. 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 Essential hypertension    7. Dementia without behavioral disturbance, unspecified dementia type (H)      ______________________________________________________________________     PHQ-2 Total Score: 0 (5/28/2019  3:00 PM)    No data recorded  ______________________________________________________________________     BMI Readings from Last 1 Encounters:   10/04/19 17.49 kg/m      ______________________________________________________________________    Plan:    1. Cephalexin (Keflex) 500 mg three times a day for 7 days for mild cellulitis.  2. Triamcinolone (Kenalog) cream to treat dermatitis which is likely causing the cellulitis.  3. Continue current medications otherwise.    George Park MD  General Internal Medicine  [facility]    Return in about 2 months (around 12/4/2019) for visit and blood work.     Future Appointments   Date Time Provider Department Center   11/7/2019 10:40 AM [facility]  Clinic   11/29/2019 10:05 AM George Park MD [facility] Clinic         ______________________________________________________________________     Relevant ICD-10 codes and order associations:      ICD-10-CM    1. Cellulitis of left leg L03.116 cephalexin (KEFLEX) 500 MG capsule   2. Venous stasis dermatitis of left lower extremity I87.2 triamcinolone (KENALOG) 0.1 % cream   3. Flu vaccine need Z23 Influenza High Dose,Seasonal,PF 65+ Yrs   4. 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 Essential hypertension I10    7. Dementia without behavioral disturbance, unspecified dementia type (H) F03.90

## 2021-07-03 NOTE — ADDENDUM NOTE
Addendum Note by Susan Guallpa MD at 8/27/2017  1:50 PM     Author: Susan Guallpa MD Service: -- Author Type: Physician    Filed: 8/27/2017  1:50 PM Encounter Date: 8/24/2017 Status: Signed    : Susan Guallpa MD (Physician)    Addended by: SUSAN GUALLPA on: 8/27/2017 01:50 PM        Modules accepted: Orders

## 2021-07-03 NOTE — ADDENDUM NOTE
Addendum Note by Susan Guallpa MD at 3/19/2020  3:44 PM     Author: Susan Guallpa MD Service: -- Author Type: Physician    Filed: 3/19/2020  3:44 PM Encounter Date: 3/19/2020 Status: Signed    : Susan Guallpa MD (Physician)    Addended by: SUSAN GUALLPA on: 3/19/2020 03:44 PM        Modules accepted: Orders

## 2021-07-14 PROBLEM — J96.01 ACUTE HYPOXEMIC RESPIRATORY FAILURE (H): Status: RESOLVED | Noted: 2020-01-01 | Resolved: 2020-01-01

## 2021-07-14 PROBLEM — G45.9 TIA (TRANSIENT ISCHEMIC ATTACK): Status: RESOLVED | Noted: 2018-11-03 | Resolved: 2018-11-15

## 2021-07-14 PROBLEM — I63.9 ACUTE CEREBROVASCULAR ACCIDENT (H): Status: RESOLVED | Noted: 2019-01-01 | Resolved: 2020-01-01

## 2021-08-03 PROBLEM — J69.0 ASPIRATION PNEUMONIA (H): Status: RESOLVED | Noted: 2020-01-01 | Resolved: 2020-01-01

## 2021-08-03 PROBLEM — E43 SEVERE PROTEIN-CALORIE MALNUTRITION (GOMEZ: LESS THAN 60% OF STANDARD WEIGHT) (H): Chronic | Status: RESOLVED | Noted: 2019-01-01 | Resolved: 2020-01-01

## 2021-08-03 PROBLEM — I82.412 DVT OF DEEP FEMORAL VEIN, LEFT (H): Status: RESOLVED | Noted: 2020-01-01 | Resolved: 2020-01-01

## 2021-08-03 PROBLEM — G81.91 RIGHT HEMIPLEGIA (H): Status: RESOLVED | Noted: 2019-01-01 | Resolved: 2020-01-01

## 2021-08-03 PROBLEM — R65.20 SEVERE SEPSIS (H): Status: RESOLVED | Noted: 2020-01-01 | Resolved: 2020-01-01

## 2021-08-03 PROBLEM — A41.9 SEVERE SEPSIS (H): Status: RESOLVED | Noted: 2020-01-01 | Resolved: 2020-01-01

## 2022-02-17 PROBLEM — I63.9 ISCHEMIC CEREBROVASCULAR ACCIDENT (CVA) (H): Status: ACTIVE | Noted: 2018-11-04

## 2022-08-29 NOTE — TELEPHONE ENCOUNTER
Yes, thank you,    George Park MD  General Internal Medicine  [facility]  2/10/2020, 3:12 PM     Respiratory